# Patient Record
Sex: MALE | Race: WHITE | NOT HISPANIC OR LATINO | Employment: OTHER | URBAN - NONMETROPOLITAN AREA
[De-identification: names, ages, dates, MRNs, and addresses within clinical notes are randomized per-mention and may not be internally consistent; named-entity substitution may affect disease eponyms.]

---

## 2020-07-14 ENCOUNTER — OFFICE VISIT (OUTPATIENT)
Dept: PODIATRY | Facility: OTHER | Age: 85
DRG: 617 | End: 2020-07-14
Attending: PODIATRIST
Payer: MEDICARE

## 2020-07-14 ENCOUNTER — PREP FOR PROCEDURE (OUTPATIENT)
Dept: PODIATRY | Facility: OTHER | Age: 85
End: 2020-07-14

## 2020-07-14 VITALS
WEIGHT: 198 LBS | HEIGHT: 69 IN | TEMPERATURE: 98.6 F | OXYGEN SATURATION: 98 % | BODY MASS INDEX: 29.33 KG/M2 | SYSTOLIC BLOOD PRESSURE: 128 MMHG | HEART RATE: 63 BPM | DIASTOLIC BLOOD PRESSURE: 65 MMHG

## 2020-07-14 DIAGNOSIS — L08.9 DIABETIC FOOT INFECTION (H): ICD-10-CM

## 2020-07-14 DIAGNOSIS — M86.171 ACUTE OSTEOMYELITIS OF RIGHT FOOT (H): Primary | ICD-10-CM

## 2020-07-14 DIAGNOSIS — E11.42 DIABETIC POLYNEUROPATHY ASSOCIATED WITH TYPE 2 DIABETES MELLITUS (H): ICD-10-CM

## 2020-07-14 DIAGNOSIS — E11.9 DIABETES MELLITUS TYPE 2, NONINSULIN DEPENDENT (H): ICD-10-CM

## 2020-07-14 DIAGNOSIS — E11.628 DIABETIC FOOT INFECTION (H): ICD-10-CM

## 2020-07-14 PROCEDURE — G0463 HOSPITAL OUTPT CLINIC VISIT: HCPCS

## 2020-07-14 PROCEDURE — 99203 OFFICE O/P NEW LOW 30 MIN: CPT | Performed by: PODIATRIST

## 2020-07-14 RX ORDER — PRAVASTATIN SODIUM 10 MG
1 TABLET ORAL WEEKLY
COMMUNITY
Start: 2020-02-29

## 2020-07-14 RX ORDER — TAMSULOSIN HYDROCHLORIDE 0.4 MG/1
0.4 CAPSULE ORAL DAILY
COMMUNITY
Start: 2020-06-15 | End: 2022-01-01

## 2020-07-14 RX ORDER — CEPHALEXIN 500 MG/1
500 CAPSULE ORAL 4 TIMES DAILY
Qty: 28 CAPSULE | Refills: 0 | Status: SHIPPED | OUTPATIENT
Start: 2020-07-14 | End: 2020-07-15

## 2020-07-14 ASSESSMENT — PAIN SCALES - GENERAL: PAINLEVEL: NO PAIN (0)

## 2020-07-14 ASSESSMENT — MIFFLIN-ST. JEOR: SCORE: 1578.5

## 2020-07-14 NOTE — NURSING NOTE
"Chief Complaint   Patient presents with     WOUND CARE     Diabetic foot exam       Initial /65 (BP Location: Left arm, Patient Position: Sitting, Cuff Size: Adult Regular)   Pulse 63   Temp 98.6  F (37  C) (Tympanic)   Ht 1.753 m (5' 9\")   Wt 89.8 kg (198 lb)   SpO2 98%   BMI 29.24 kg/m   Estimated body mass index is 29.24 kg/m  as calculated from the following:    Height as of this encounter: 1.753 m (5' 9\").    Weight as of this encounter: 89.8 kg (198 lb).  Medication Reconciliation: complete  Janet Martin LPN  "

## 2020-07-14 NOTE — PATIENT INSTRUCTIONS
-Diabetic Foot Education:  ---Check the feet daily looking for new new blisters or wounds  ---Wear shoes at all times when walking including around the house  ---Avoid lotion application between the toes.   ---If you have any open wounds with signs of infection (redness, swelling, pain, purulence, fever, chills, nausea, vomiting), go to the Emergency Department as soon as possible.      Thank you for allowing  and our Podiatry team to participate in your care. Please call our office at [phone] with scheduling questions or with any other questions or concerns.

## 2020-07-14 NOTE — PROGRESS NOTES
Chief complaint: Patient presents with:  WOUND CARE  Diabetic foot exam      History of Present Illness: This 84 year old male is seen at the request of No ref. provider found for evaluation and suggestions of management of a diabetic foot ulcer of the RIGHT foot.    History of wound:  Patient says he has had the ulcer for six months. He says he originally had one ulcer, but then it became two ulcers. He has been on two rounds of Keflex in the past for the ulceration. He says he has been seeing Dr. Kiser for this wound. He has a Pressure Guardian Boot from [facility] in [place], [place]. He has been wearing it consistently, but his wound became infected before the skin healed over the infected ulcer.     Patient says he most recently saw Dr. Kiser on 07/09/2020. He reviewed an MRI of the RIGHT foot with the patient (MRI from 07/02/2020) and he was told his bone was infected. It was recommended he have a surgical I&D with IV antibiotics, but the patient refused stating this was not an option because his wife has dementia and she cannot be left alone for long periods of time. An ID consult was placed, but the patient says he was never contacted by ID. The patient was also under the impression that ID was recommended instead of an I&D.    Today:  Patient presents stating his foot is more red and swollen. He is concerned about the infection and he thinks the wound is getting worse. He does not want to lose his leg. He does not want to be treated in [place] due to the distance from his wife. He says his wife has dementia and she is on hospice care. His wife has assistance from family members, but the patient prefers not to leave her for a long period of time. He is here today to discuss treatment options and recommendations.    No further pedal complaints today.    Patient does not use tobacco products.     Last HbA1C was 6.0% on 01/13/2020.        /65 (BP Location: Left arm, Patient Position:  "Sitting, Cuff Size: Adult Regular)   Pulse 63   Temp 98.6  F (37  C) (Tympanic)   Ht 1.753 m (5' 9\")   Wt 89.8 kg (198 lb)   SpO2 98%   BMI 29.24 kg/m      There is no problem list on file for this patient.      Past Surgical History:   Procedure Laterality Date     APPENDECTOMY       EYE SURGERY Right     lens implant     JOINT REPLACEMENT Right      TONSILLECTOMY         Current Outpatient Medications   Medication     ALLOPURINOL PO     AMOXICILLIN PO     aspirin 81 MG tablet     Atenolol (TENORMIN PO)     fluocinonide (LIDEX) 0.05 % cream     Losartan Potassium (COZAAR PO)     Multiple Vitamin (MULTIVITAMINS PO)     NAPROXEN SODIUM PO     pravastatin (PRAVACHOL) 10 MG tablet     tamsulosin (FLOMAX) 0.4 MG capsule     omeprazole (PRILOSEC) 20 MG capsule     Prochlorperazine Maleate (COMPAZINE PO)     sucralfate (CARAFATE) 1 GM/10ML suspension     No current facility-administered medications for this visit.           Allergies   Allergen Reactions     Flu Virus Vaccine Anaphylaxis     Altace [Ramipril] Other (See Comments)     Dizziness       Ciprofloxacin Diarrhea     Lisinopril Other (See Comments)     Dizziness       Levaquin [Levofloxacin] Unknown     Sulfa Drugs Unknown     Celebrex [Celecoxib] Rash     Hydrocodone Rash     Niacin Itching       Family History   Problem Relation Age of Onset     Cancer No family hx of        Social History     Socioeconomic History     Marital status:      Spouse name: Not on file     Number of children: Not on file     Years of education: Not on file     Highest education level: Not on file   Occupational History     Not on file   Social Needs     Financial resource strain: Not on file     Food insecurity     Worry: Not on file     Inability: Not on file     Transportation needs     Medical: Not on file     Non-medical: Not on file   Tobacco Use     Smoking status: Former Smoker     Years: 50.00     Types: Cigarettes     Last attempt to quit: 1/1/2001     Years " since quittin.5   Substance and Sexual Activity     Alcohol use: No     Drug use: Not on file     Sexual activity: Not on file   Lifestyle     Physical activity     Days per week: Not on file     Minutes per session: Not on file     Stress: Not on file   Relationships     Social connections     Talks on phone: Not on file     Gets together: Not on file     Attends [employer] service: Not on file     Active member of club or organization: Not on file     Attends meetings of clubs or organizations: Not on file     Relationship status: Not on file     Intimate partner violence     Fear of current or ex partner: Not on file     Emotionally abused: Not on file     Physically abused: Not on file     Forced sexual activity: Not on file   Other Topics Concern     Parent/sibling w/ CABG, MI or angioplasty before 65F 55M? Not Asked   Social History Narrative     Not on file       ROS: 10 point ROS neg other than the symptoms noted above in the HPI.  EXAM  Constitutional: healthy, alert and no distress    Psychiatric: mentation appears normal and affect normal/bright    VASCULAR:  -Dorsalis pedis pulse +2/4 b/l  -Posterior tibial pulse +1/4 b/l  -Capillary refill time < 3 seconds to b/l hallux  -Hair growth Absent to b/l anterior legs and ankles  NEURO:  -Light touch sensation intact to b/l plantar forefoot  DERM:  -Mild-to-moderate erythema to the distal half of the RIGHT foot with moderate erythema (only partially blanchable), edema and calor to the entire first ray  ---Soft, bogginess to the entire plantar and partially to the medial 1st metatarsal head  ---Infection to the RIGHT foot  ---No open wounds at this time, but purple discoloration to skin to the plantar and to the medial 1st metatarsal head    -Skin temperature, texture and turgor WNL b/l  -Toenails elongated, thickened, dystrophic and discolored x 10  MSK:  -No pain on palpation to RIGHT medial 1st metatarsal head  -Muscle strength of ankles +5/5 for  dorsiflexion, plantarflexion, ABDUction and ADDuction b/l    MRI RIGHT FOOT 07/02/2020  Soft Tissues: Persistent narrow plantar soft tissue defect overlying the medial 1st metatarsal head which extends up to the tibial sesamoid bone (series 11 image 18). Small peripherally enhancing fluid collection measuring 1.0 x 0.4 cm dorsomedial to the 1st metatarsal head (series 11 image 20) with an adjacent medial skin sinus tract.    IMPRESSION:  1.  Acute osteomyelitis of the 1st metatarsal head and tibial sesamoid. Small 1.0 cm abscess within the soft tissues dorsomedial to the 1st metatarsal head with an adjacent skin sinus tract.  2.  First metatarsophalangeal joint synovitis.  3.  Mild flexor hallucis longus tendinosis and tenosynovitis.  ============================================================    ASSESSMENT:  (M86.171) Acute osteomyelitis of right foot (H)  (primary encounter diagnosis)    (E11.628,  L08.9) Diabetic foot infection (H)    (E11.9) Diabetes mellitus type 2, noninsulin dependent (H)    (E11.42) Diabetic polyneuropathy associated with type 2 diabetes mellitus (H)      PLAN:  -Patient evaluated and examined. Treatment options discussed with no educational barriers noted.  -Patient has a  Moderate diabetic foot infection. Given his person history, the documentation from his most recent appointment with Dr. Kiser and his MRI results, his foot is much worse today. He still has the option of considering ID, but at this point it is likely they will recommend the patient first have an I&D to clear the abscess and immediate acute infection prior to attempting long term antibiotics. The patient may only require an I&D with minimal bone excision, but this could likely require a partial first ray amputation, TMA (if the infection has spread), or a BKA if the infection extends to most of the foot. Based on clinical appearance, a partial first ray amputation seems most likely at this time. It is recommended he  receive an I&D and amputation as soon as possible. It is also recommended he be admitted for IV antibiotics over the course of the surgery. Alternative options would be seeing ID and an I&D without hospital admission, but he is risking worsening of the infection.   ---Patient does not feel ill and he is most concerned about his wife at home right now. His wife has dementia and she is on hospice care, and patient needs to make arrangements with his family before being admitted. He has agreed to to come in on 07/15/2020 at 9:00 a.m. for hospital admission.  ---Spoke with the hospitalist, Dr. Mac, who stated the patient may come in the morning for hospital admission  ---Spoke with Faye Banegas -- she will speak with the OR team first thing in the morning to get this case scheduled, but she anticipates potentially starting the case at 12:30.  ---Anticipating possible repeat I&D on Friday, 07/17/2020 if the wound needs to be packed open    -Hospital admission around 9:00 a.m. on Wednesday, 07/15/2020    Surgery: Right foot incision and drainage with amputation of infected bone  Diagnosis:   1. Acute osteomyelitis of right foot  2. Diabetic foot infection  DOS: 07/15/2020    -Keflex 500mg four times daily prescribed for patient to take tonight and tomorrow morning -- reinforced that this will not treat the infection, but is aimed at preventing worsening of the infection, so it is still important for him to come in tomorrow morning for treatment. He is in agreement with this plan and he says he understands this instruction and that the antibiotics will unlikely fully treat his abscess / infection.    -Patient in agreement with the above treatment plan and all of patient's questions were answered.      Will follow patient in the hospital        Erin Harris DPM

## 2020-07-15 ENCOUNTER — APPOINTMENT (OUTPATIENT)
Dept: GENERAL RADIOLOGY | Facility: HOSPITAL | Age: 85
DRG: 617 | End: 2020-07-15
Attending: PODIATRIST
Payer: MEDICARE

## 2020-07-15 ENCOUNTER — ANESTHESIA (OUTPATIENT)
Dept: SURGERY | Facility: HOSPITAL | Age: 85
DRG: 617 | End: 2020-07-15
Payer: MEDICARE

## 2020-07-15 ENCOUNTER — ANESTHESIA EVENT (OUTPATIENT)
Dept: SURGERY | Facility: HOSPITAL | Age: 85
DRG: 617 | End: 2020-07-15
Payer: MEDICARE

## 2020-07-15 ENCOUNTER — HOSPITAL ENCOUNTER (INPATIENT)
Facility: HOSPITAL | Age: 85
LOS: 2 days | Discharge: HOME | DRG: 617 | End: 2020-07-17
Attending: INTERNAL MEDICINE | Admitting: PODIATRIST
Payer: MEDICARE

## 2020-07-15 DIAGNOSIS — E11.59 HYPERTENSION ASSOCIATED WITH DIABETES (H): Primary | ICD-10-CM

## 2020-07-15 DIAGNOSIS — I15.2 HYPERTENSION ASSOCIATED WITH DIABETES (H): Primary | ICD-10-CM

## 2020-07-15 PROBLEM — M86.9 OSTEOMYELITIS (H): Status: ACTIVE | Noted: 2020-07-15

## 2020-07-15 LAB
ANION GAP SERPL CALCULATED.3IONS-SCNC: 1 MMOL/L (ref 3–14)
BASOPHILS # BLD AUTO: 0 10E9/L (ref 0–0.2)
BASOPHILS NFR BLD AUTO: 0.2 %
BUN SERPL-MCNC: 27 MG/DL (ref 7–30)
CALCIUM SERPL-MCNC: 10 MG/DL (ref 8.5–10.1)
CHLORIDE SERPL-SCNC: 104 MMOL/L (ref 94–109)
CO2 SERPL-SCNC: 31 MMOL/L (ref 20–32)
CREAT SERPL-MCNC: 1.16 MG/DL (ref 0.66–1.25)
CREAT SERPL-MCNC: 1.19 MG/DL (ref 0.66–1.25)
DIFFERENTIAL METHOD BLD: ABNORMAL
EOSINOPHIL # BLD AUTO: 0.1 10E9/L (ref 0–0.7)
EOSINOPHIL NFR BLD AUTO: 2 %
ERYTHROCYTE [DISTWIDTH] IN BLOOD BY AUTOMATED COUNT: 13.8 % (ref 10–15)
GFR SERPL CREATININE-BSD FRML MDRD: 55 ML/MIN/{1.73_M2}
GFR SERPL CREATININE-BSD FRML MDRD: 57 ML/MIN/{1.73_M2}
GLUCOSE BLDC GLUCOMTR-MCNC: 104 MG/DL (ref 70–99)
GLUCOSE BLDC GLUCOMTR-MCNC: 124 MG/DL (ref 70–99)
GLUCOSE BLDC GLUCOMTR-MCNC: 209 MG/DL (ref 70–99)
GLUCOSE BLDC GLUCOMTR-MCNC: 211 MG/DL (ref 70–99)
GLUCOSE SERPL-MCNC: 125 MG/DL (ref 70–99)
HCT VFR BLD AUTO: 35.5 % (ref 40–53)
HGB BLD-MCNC: 11.5 G/DL (ref 13.3–17.7)
IMM GRANULOCYTES # BLD: 0 10E9/L (ref 0–0.4)
IMM GRANULOCYTES NFR BLD: 0.2 %
LABORATORY COMMENT REPORT: NORMAL
LYMPHOCYTES # BLD AUTO: 0.4 10E9/L (ref 0.8–5.3)
LYMPHOCYTES NFR BLD AUTO: 8.4 %
MCH RBC QN AUTO: 30.8 PG (ref 26.5–33)
MCHC RBC AUTO-ENTMCNC: 32.4 G/DL (ref 31.5–36.5)
MCV RBC AUTO: 95 FL (ref 78–100)
MONOCYTES # BLD AUTO: 0.6 10E9/L (ref 0–1.3)
MONOCYTES NFR BLD AUTO: 14.1 %
NEUTROPHILS # BLD AUTO: 3.4 10E9/L (ref 1.6–8.3)
NEUTROPHILS NFR BLD AUTO: 75.1 %
NRBC # BLD AUTO: 0 10*3/UL
NRBC BLD AUTO-RTO: 0 /100
PLATELET # BLD AUTO: 144 10E9/L (ref 150–450)
POTASSIUM SERPL-SCNC: 4.2 MMOL/L (ref 3.4–5.3)
RBC # BLD AUTO: 3.73 10E12/L (ref 4.4–5.9)
SARS-COV-2 RNA SPEC QL NAA+PROBE: NEGATIVE
SARS-COV-2 RNA SPEC QL NAA+PROBE: NORMAL
SODIUM SERPL-SCNC: 136 MMOL/L (ref 133–144)
SPECIMEN SOURCE: NORMAL
SPECIMEN SOURCE: NORMAL
WBC # BLD AUTO: 4.6 10E9/L (ref 4–11)

## 2020-07-15 PROCEDURE — 28810 AMPUTATION TOE & METATARSAL: CPT | Performed by: NURSE ANESTHETIST, CERTIFIED REGISTERED

## 2020-07-15 PROCEDURE — 80048 BASIC METABOLIC PNL TOTAL CA: CPT | Performed by: INTERNAL MEDICINE

## 2020-07-15 PROCEDURE — 36000052 ZZH SURGERY LEVEL 2 EA 15 ADDTL MIN: Performed by: PODIATRIST

## 2020-07-15 PROCEDURE — 87635 SARS-COV-2 COVID-19 AMP PRB: CPT | Performed by: INTERNAL MEDICINE

## 2020-07-15 PROCEDURE — 25000125 ZZHC RX 250: Performed by: PODIATRIST

## 2020-07-15 PROCEDURE — 87070 CULTURE OTHR SPECIMN AEROBIC: CPT | Performed by: PODIATRIST

## 2020-07-15 PROCEDURE — 73630 X-RAY EXAM OF FOOT: CPT | Mod: TC,RT

## 2020-07-15 PROCEDURE — 37000009 ZZH ANESTHESIA TECHNICAL FEE, EACH ADDTL 15 MIN: Performed by: PODIATRIST

## 2020-07-15 PROCEDURE — 25000131 ZZH RX MED GY IP 250 OP 636 PS 637: Performed by: INTERNAL MEDICINE

## 2020-07-15 PROCEDURE — 36000050 ZZH SURGERY LEVEL 2 1ST 30 MIN: Performed by: PODIATRIST

## 2020-07-15 PROCEDURE — 27210794 ZZH OR GENERAL SUPPLY STERILE: Performed by: PODIATRIST

## 2020-07-15 PROCEDURE — 87205 SMEAR GRAM STAIN: CPT | Performed by: PODIATRIST

## 2020-07-15 PROCEDURE — 25000128 H RX IP 250 OP 636: Performed by: INTERNAL MEDICINE

## 2020-07-15 PROCEDURE — 82565 ASSAY OF CREATININE: CPT | Performed by: INTERNAL MEDICINE

## 2020-07-15 PROCEDURE — 88304 TISSUE EXAM BY PATHOLOGIST: CPT | Mod: TC | Performed by: PODIATRIST

## 2020-07-15 PROCEDURE — 25000128 H RX IP 250 OP 636: Performed by: NURSE ANESTHETIST, CERTIFIED REGISTERED

## 2020-07-15 PROCEDURE — 40000986 XR FOOT PORT RT 3 VW

## 2020-07-15 PROCEDURE — 0Y6M0Z9 DETACHMENT AT RIGHT FOOT, PARTIAL 1ST RAY, OPEN APPROACH: ICD-10-PCS | Performed by: PODIATRIST

## 2020-07-15 PROCEDURE — 36415 COLL VENOUS BLD VENIPUNCTURE: CPT | Performed by: INTERNAL MEDICINE

## 2020-07-15 PROCEDURE — 28810 AMPUTATION TOE & METATARSAL: CPT | Performed by: PODIATRIST

## 2020-07-15 PROCEDURE — 40000305 ZZH STATISTIC PRE PROC ASSESS I: Performed by: PODIATRIST

## 2020-07-15 PROCEDURE — 37000008 ZZH ANESTHESIA TECHNICAL FEE, 1ST 30 MIN: Performed by: PODIATRIST

## 2020-07-15 PROCEDURE — 99100 ANES PT EXTEME AGE<1 YR&>70: CPT | Performed by: NURSE ANESTHETIST, CERTIFIED REGISTERED

## 2020-07-15 PROCEDURE — 71000014 ZZH RECOVERY PHASE 1 LEVEL 2 FIRST HR: Performed by: PODIATRIST

## 2020-07-15 PROCEDURE — 87075 CULTR BACTERIA EXCEPT BLOOD: CPT | Performed by: PODIATRIST

## 2020-07-15 PROCEDURE — 99222 1ST HOSP IP/OBS MODERATE 55: CPT | Mod: AI | Performed by: INTERNAL MEDICINE

## 2020-07-15 PROCEDURE — 25000132 ZZH RX MED GY IP 250 OP 250 PS 637: Performed by: PODIATRIST

## 2020-07-15 PROCEDURE — 25800030 ZZH RX IP 258 OP 636: Performed by: INTERNAL MEDICINE

## 2020-07-15 PROCEDURE — 85025 COMPLETE CBC W/AUTO DIFF WBC: CPT | Performed by: INTERNAL MEDICINE

## 2020-07-15 PROCEDURE — 87102 FUNGUS ISOLATION CULTURE: CPT | Performed by: PODIATRIST

## 2020-07-15 PROCEDURE — 93010 ELECTROCARDIOGRAM REPORT: CPT | Performed by: INTERNAL MEDICINE

## 2020-07-15 PROCEDURE — 87186 SC STD MICRODIL/AGAR DIL: CPT | Performed by: PODIATRIST

## 2020-07-15 PROCEDURE — 88311 DECALCIFY TISSUE: CPT | Mod: TC | Performed by: PODIATRIST

## 2020-07-15 PROCEDURE — 87077 CULTURE AEROBIC IDENTIFY: CPT | Performed by: PODIATRIST

## 2020-07-15 PROCEDURE — 12000000 ZZH R&B MED SURG/OB

## 2020-07-15 PROCEDURE — 40000786 ZZHCL STATISTIC ACTIVE MRSA SURVEILLANCE CULTURE: Performed by: INTERNAL MEDICINE

## 2020-07-15 PROCEDURE — 93005 ELECTROCARDIOGRAM TRACING: CPT

## 2020-07-15 PROCEDURE — 25800030 ZZH RX IP 258 OP 636: Performed by: NURSE ANESTHETIST, CERTIFIED REGISTERED

## 2020-07-15 PROCEDURE — 00000146 ZZHCL STATISTIC GLUCOSE BY METER IP

## 2020-07-15 PROCEDURE — 25000125 ZZHC RX 250: Performed by: NURSE ANESTHETIST, CERTIFIED REGISTERED

## 2020-07-15 RX ORDER — BUPIVACAINE HYDROCHLORIDE 5 MG/ML
INJECTION, SOLUTION EPIDURAL; INTRACAUDAL
Status: DISCONTINUED
Start: 2020-07-15 | End: 2020-07-15 | Stop reason: HOSPADM

## 2020-07-15 RX ORDER — KETAMINE HYDROCHLORIDE 10 MG/ML
INJECTION INTRAMUSCULAR; INTRAVENOUS PRN
Status: DISCONTINUED | OUTPATIENT
Start: 2020-07-15 | End: 2020-07-15

## 2020-07-15 RX ORDER — PHENOL 1.4 %
600 AEROSOL, SPRAY (ML) MUCOUS MEMBRANE
COMMUNITY
Start: 2019-01-23

## 2020-07-15 RX ORDER — SODIUM CHLORIDE 9 MG/ML
INJECTION, SOLUTION INTRAVENOUS CONTINUOUS
Status: DISCONTINUED | OUTPATIENT
Start: 2020-07-15 | End: 2020-07-17 | Stop reason: HOSPADM

## 2020-07-15 RX ORDER — ASPIRIN 81 MG/1
81 TABLET, CHEWABLE ORAL DAILY
Status: DISCONTINUED | OUTPATIENT
Start: 2020-07-16 | End: 2020-07-17 | Stop reason: HOSPADM

## 2020-07-15 RX ORDER — POLYMYXIN B SULFATE 500000 [IU]/1
INJECTION, POWDER, LYOPHILIZED, FOR SOLUTION INTRAMUSCULAR; INTRATHECAL; INTRAVENOUS; OPHTHALMIC PRN
Status: DISCONTINUED | OUTPATIENT
Start: 2020-07-15 | End: 2020-07-15 | Stop reason: HOSPADM

## 2020-07-15 RX ORDER — SODIUM HYPOCHLORITE 5 MG/ML
SOLUTION TOPICAL
Status: COMPLETED | OUTPATIENT
Start: 2020-07-15 | End: 2020-07-16

## 2020-07-15 RX ORDER — FLUOCINONIDE 0.5 MG/G
OINTMENT TOPICAL 2 TIMES DAILY
COMMUNITY
Start: 2020-06-15 | End: 2022-01-01

## 2020-07-15 RX ORDER — SODIUM CHLORIDE, SODIUM LACTATE, POTASSIUM CHLORIDE, CALCIUM CHLORIDE 600; 310; 30; 20 MG/100ML; MG/100ML; MG/100ML; MG/100ML
INJECTION, SOLUTION INTRAVENOUS CONTINUOUS
Status: DISCONTINUED | OUTPATIENT
Start: 2020-07-15 | End: 2020-07-15 | Stop reason: HOSPADM

## 2020-07-15 RX ORDER — SODIUM CHLORIDE, SODIUM LACTATE, POTASSIUM CHLORIDE, CALCIUM CHLORIDE 600; 310; 30; 20 MG/100ML; MG/100ML; MG/100ML; MG/100ML
INJECTION, SOLUTION INTRAVENOUS CONTINUOUS
Status: DISCONTINUED | OUTPATIENT
Start: 2020-07-15 | End: 2020-07-15

## 2020-07-15 RX ORDER — PROPOFOL 10 MG/ML
INJECTION, EMULSION INTRAVENOUS CONTINUOUS PRN
Status: DISCONTINUED | OUTPATIENT
Start: 2020-07-15 | End: 2020-07-15

## 2020-07-15 RX ORDER — CEFAZOLIN SODIUM 1 G/50ML
1750 SOLUTION INTRAVENOUS EVERY 12 HOURS
Status: DISCONTINUED | OUTPATIENT
Start: 2020-07-15 | End: 2020-07-16

## 2020-07-15 RX ORDER — POLYETHYLENE GLYCOL 3350 17 G/17G
17 POWDER, FOR SOLUTION ORAL DAILY PRN
Status: DISCONTINUED | OUTPATIENT
Start: 2020-07-15 | End: 2020-07-17 | Stop reason: HOSPADM

## 2020-07-15 RX ORDER — EPHEDRINE SULFATE 50 MG/ML
INJECTION, SOLUTION INTRAMUSCULAR; INTRAVENOUS; SUBCUTANEOUS PRN
Status: DISCONTINUED | OUTPATIENT
Start: 2020-07-15 | End: 2020-07-15

## 2020-07-15 RX ORDER — LIDOCAINE 40 MG/G
CREAM TOPICAL
Status: DISCONTINUED | OUTPATIENT
Start: 2020-07-15 | End: 2020-07-17 | Stop reason: HOSPADM

## 2020-07-15 RX ORDER — FENTANYL CITRATE 50 UG/ML
INJECTION, SOLUTION INTRAMUSCULAR; INTRAVENOUS PRN
Status: DISCONTINUED | OUTPATIENT
Start: 2020-07-15 | End: 2020-07-15

## 2020-07-15 RX ORDER — ATENOLOL 50 MG/1
25 TABLET ORAL DAILY
Status: ON HOLD | COMMUNITY
Start: 2020-06-15 | End: 2020-07-17

## 2020-07-15 RX ORDER — LOSARTAN POTASSIUM 25 MG/1
25 TABLET ORAL DAILY
Status: DISCONTINUED | OUTPATIENT
Start: 2020-07-16 | End: 2020-07-17 | Stop reason: HOSPADM

## 2020-07-15 RX ORDER — SODIUM CHLORIDE 9 MG/ML
INJECTION, SOLUTION INTRAVENOUS CONTINUOUS PRN
Status: DISCONTINUED | OUTPATIENT
Start: 2020-07-15 | End: 2020-07-15

## 2020-07-15 RX ORDER — LIDOCAINE HYDROCHLORIDE 10 MG/ML
INJECTION, SOLUTION EPIDURAL; INFILTRATION; INTRACAUDAL; PERINEURAL
Status: DISCONTINUED
Start: 2020-07-15 | End: 2020-07-15 | Stop reason: HOSPADM

## 2020-07-15 RX ORDER — NALOXONE HYDROCHLORIDE 0.4 MG/ML
.1-.4 INJECTION, SOLUTION INTRAMUSCULAR; INTRAVENOUS; SUBCUTANEOUS
Status: DISCONTINUED | OUTPATIENT
Start: 2020-07-15 | End: 2020-07-17 | Stop reason: HOSPADM

## 2020-07-15 RX ORDER — BACITRACIN ZINC 500 [USP'U]/G
OINTMENT TOPICAL
Status: DISCONTINUED
Start: 2020-07-15 | End: 2020-07-15 | Stop reason: HOSPADM

## 2020-07-15 RX ORDER — DEXTROSE MONOHYDRATE 25 G/50ML
25-50 INJECTION, SOLUTION INTRAVENOUS
Status: DISCONTINUED | OUTPATIENT
Start: 2020-07-15 | End: 2020-07-17 | Stop reason: HOSPADM

## 2020-07-15 RX ORDER — NALOXONE HYDROCHLORIDE 0.4 MG/ML
.1-.4 INJECTION, SOLUTION INTRAMUSCULAR; INTRAVENOUS; SUBCUTANEOUS
Status: ACTIVE | OUTPATIENT
Start: 2020-07-15 | End: 2020-07-16

## 2020-07-15 RX ORDER — ONDANSETRON 2 MG/ML
4 INJECTION INTRAMUSCULAR; INTRAVENOUS EVERY 30 MIN PRN
Status: DISCONTINUED | OUTPATIENT
Start: 2020-07-15 | End: 2020-07-15 | Stop reason: HOSPADM

## 2020-07-15 RX ORDER — ALLOPURINOL 100 MG/1
200 TABLET ORAL DAILY
COMMUNITY
Start: 2020-06-15 | End: 2022-01-01

## 2020-07-15 RX ORDER — LOSARTAN POTASSIUM 25 MG/1
25 TABLET ORAL DAILY
COMMUNITY
Start: 2020-05-30 | End: 2022-01-01

## 2020-07-15 RX ORDER — NICOTINE POLACRILEX 4 MG
15-30 LOZENGE BUCCAL
Status: DISCONTINUED | OUTPATIENT
Start: 2020-07-15 | End: 2020-07-17 | Stop reason: HOSPADM

## 2020-07-15 RX ORDER — FENTANYL CITRATE 50 UG/ML
25-50 INJECTION, SOLUTION INTRAMUSCULAR; INTRAVENOUS
Status: DISCONTINUED | OUTPATIENT
Start: 2020-07-15 | End: 2020-07-15 | Stop reason: HOSPADM

## 2020-07-15 RX ORDER — ATENOLOL 25 MG/1
25 TABLET ORAL DAILY
Status: DISCONTINUED | OUTPATIENT
Start: 2020-07-16 | End: 2020-07-17

## 2020-07-15 RX ORDER — NAPROXEN SODIUM 220 MG
220 TABLET ORAL DAILY
COMMUNITY

## 2020-07-15 RX ORDER — AMOXICILLIN 250 MG
2 CAPSULE ORAL 2 TIMES DAILY PRN
Status: DISCONTINUED | OUTPATIENT
Start: 2020-07-15 | End: 2020-07-17 | Stop reason: HOSPADM

## 2020-07-15 RX ORDER — SODIUM HYPOCHLORITE 5 MG/ML
SOLUTION TOPICAL PRN
Status: DISCONTINUED | OUTPATIENT
Start: 2020-07-15 | End: 2020-07-15 | Stop reason: HOSPADM

## 2020-07-15 RX ORDER — ALLOPURINOL 100 MG/1
200 TABLET ORAL DAILY
Status: DISCONTINUED | OUTPATIENT
Start: 2020-07-16 | End: 2020-07-17 | Stop reason: HOSPADM

## 2020-07-15 RX ORDER — ONDANSETRON 4 MG/1
4 TABLET, ORALLY DISINTEGRATING ORAL EVERY 30 MIN PRN
Status: DISCONTINUED | OUTPATIENT
Start: 2020-07-15 | End: 2020-07-15 | Stop reason: HOSPADM

## 2020-07-15 RX ORDER — AMOXICILLIN 250 MG
1 CAPSULE ORAL 2 TIMES DAILY PRN
Status: DISCONTINUED | OUTPATIENT
Start: 2020-07-15 | End: 2020-07-17 | Stop reason: HOSPADM

## 2020-07-15 RX ORDER — PROPOFOL 10 MG/ML
INJECTION, EMULSION INTRAVENOUS PRN
Status: DISCONTINUED | OUTPATIENT
Start: 2020-07-15 | End: 2020-07-15

## 2020-07-15 RX ORDER — CEPHALEXIN 500 MG/1
500 CAPSULE ORAL 4 TIMES DAILY
COMMUNITY
Start: 2020-07-14 | End: 2020-07-21

## 2020-07-15 RX ORDER — TAMSULOSIN HYDROCHLORIDE 0.4 MG/1
0.4 CAPSULE ORAL DAILY
Status: DISCONTINUED | OUTPATIENT
Start: 2020-07-16 | End: 2020-07-17 | Stop reason: HOSPADM

## 2020-07-15 RX ADMIN — FENTANYL CITRATE 25 MCG: 50 INJECTION, SOLUTION INTRAMUSCULAR; INTRAVENOUS at 14:39

## 2020-07-15 RX ADMIN — VANCOMYCIN HYDROCHLORIDE 1750 MG: 1 INJECTION, POWDER, LYOPHILIZED, FOR SOLUTION INTRAVENOUS at 10:54

## 2020-07-15 RX ADMIN — INSULIN ASPART 2 UNITS: 100 INJECTION, SOLUTION INTRAVENOUS; SUBCUTANEOUS at 21:31

## 2020-07-15 RX ADMIN — Medication 5 MG: at 15:33

## 2020-07-15 RX ADMIN — MIDAZOLAM 1 MG: 1 INJECTION INTRAMUSCULAR; INTRAVENOUS at 14:24

## 2020-07-15 RX ADMIN — Medication 10 MG: at 14:30

## 2020-07-15 RX ADMIN — TAZOBACTAM SODIUM AND PIPERACILLIN SODIUM 3.38 G: 375; 3 INJECTION, SOLUTION INTRAVENOUS at 20:35

## 2020-07-15 RX ADMIN — TAZOBACTAM SODIUM AND PIPERACILLIN SODIUM 4.5 G: 500; 4 INJECTION, SOLUTION INTRAVENOUS at 09:21

## 2020-07-15 RX ADMIN — SODIUM CHLORIDE: 9 INJECTION, SOLUTION INTRAVENOUS at 12:36

## 2020-07-15 RX ADMIN — Medication 10 MG: at 15:11

## 2020-07-15 RX ADMIN — Medication 10 MG: at 14:37

## 2020-07-15 RX ADMIN — VANCOMYCIN HYDROCHLORIDE 1750 MG: 1 INJECTION, POWDER, LYOPHILIZED, FOR SOLUTION INTRAVENOUS at 22:04

## 2020-07-15 RX ADMIN — FENTANYL CITRATE 50 MCG: 50 INJECTION, SOLUTION INTRAMUSCULAR; INTRAVENOUS at 14:30

## 2020-07-15 RX ADMIN — Medication 5 MG: at 15:02

## 2020-07-15 RX ADMIN — PROPOFOL 50 MCG/KG/MIN: 10 INJECTION, EMULSION INTRAVENOUS at 14:31

## 2020-07-15 RX ADMIN — Medication 5 MG: at 15:23

## 2020-07-15 RX ADMIN — PROPOFOL 20 MG: 10 INJECTION, EMULSION INTRAVENOUS at 14:31

## 2020-07-15 RX ADMIN — SODIUM CHLORIDE: 9 INJECTION, SOLUTION INTRAVENOUS at 09:20

## 2020-07-15 ASSESSMENT — MIFFLIN-ST. JEOR: SCORE: 1522.38

## 2020-07-15 ASSESSMENT — ACTIVITIES OF DAILY LIVING (ADL): ADLS_ACUITY_SCORE: 12

## 2020-07-15 ASSESSMENT — LIFESTYLE VARIABLES: TOBACCO_USE: 1

## 2020-07-15 NOTE — BRIEF OP NOTE
[facility]    Brief Operative Note    Pre-operative diagnosis: Acute osteomyelitis of right foot (H) [M86.171]  Diabetic foot infection (H) [E11.628, L08.9]  Post-operative diagnosis 1. Chronic osteomyelitis of right foot 2. Diabetic foot infection 3. Abscess of Right foot    Procedure: Right foot incision and drainage with partial first ray amputation  Surgeon: Surgeon(s) and Role:     * Erin Harris DPM - Primary  Anesthesia: Combined MAC with Local   Estimated blood loss: Less than 50 ml  Drains: None  Specimens:   ID Type Source Tests Collected by Time Destination   1 : right first ray Wound Foot, Right ANAEROBIC BACTERIAL CULTURE, FUNGUS CULTURE, GRAM STAIN, WOUND CULTURE AEROBIC BACTERIAL Erin Harris DPM 7/15/2020  2:54 PM    2 : right first ray  Bone Foot, Right BONE CULTURE AEROBIC BACTERIAL, FUNGUS CULTURE Erin Harris DPM 7/15/2020  3:15 PM    A : right first ray Bone Foot, Right SURGICAL PATHOLOGY EXAM Erin Harris DPM 7/15/2020  2:59 PM      Findings:   None.  Complications: None.  Implants: * No implants in log *

## 2020-07-15 NOTE — PLAN OF CARE
Problem: Adult Inpatient Plan of Care  Goal: Patient-Specific Goal (Individualization)  7/15/2020 1801 by Hetal Contreras RN  Outcome: No Change   Patient up from surgery at 1715. He is alert and oriented, talking with staff. Apical pulse is regular but bradycardic. Lung sounds with expiratory wheezes to left lower lobe. Other fields clear. Bowel sounds active x 4 quadrants. Patient endorses numbness in left foot. He states that he is currently unable to feel right foot, but has just had surgery on that one.   1920: Patient ate 100% of his dinner. He does not verbalize any complaints at this time. Will continue to monitor.  2130: Patient HS accucheck was 208. 2 units of novolog given per sliding scale. Patient states that he does not take insulin at home.     Face to face end of shift report communicated to MALENA Jansen.

## 2020-07-15 NOTE — ANESTHESIA CARE TRANSFER NOTE
Patient: Michael Snider    Procedure(s):  Right foot incision and drainage with first ray amputation    Diagnosis: Acute osteomyelitis of right foot (H) [M86.171]  Diabetic foot infection (H) [E11.628, L08.9]  Diagnosis Additional Information: No value filed.    Anesthesia Type:   MAC     Note:  Airway :Nasal Cannula  Patient transferred to:PACU  Handoff Report: Identifed the Patient, Identified the Reponsible Provider, Reviewed the pertinent medical history, Discussed the surgical course, Reviewed Intra-OP anesthesia mangement and issues during anesthesia, Set expectations for post-procedure period and Allowed opportunity for questions and acknowledgement of understanding      Vitals: (Last set prior to Anesthesia Care Transfer)    CRNA VITALS  7/15/2020 1538 - 7/15/2020 1614      7/15/2020             Resp Rate (set):  8                Electronically Signed By: LUCI Irizarry CRNA  July 15, 2020  4:14 PM

## 2020-07-15 NOTE — ADDENDUM NOTE
Addendum  created 07/15/20 1631 by Nancy Franklin APRN CRNA    Order list changed, Order sets accessed

## 2020-07-15 NOTE — ANESTHESIA POSTPROCEDURE EVALUATION
Patient: Michael Snider    Procedure(s):  Right foot incision and drainage with first ray amputation    Diagnosis:Acute osteomyelitis of right foot (H) [M86.171]  Diabetic foot infection (H) [E11.628, L08.9]  Diagnosis Additional Information: No value filed.    Anesthesia Type:  MAC    Note:  Anesthesia Post Evaluation    Patient participation: Able to fully participate in evaluation  Level of consciousness: awake  Pain management: adequate  Airway patency: patent  Cardiovascular status: acceptable  Respiratory status: acceptable  Hydration status: acceptable  PONV: none     Anesthetic complications: None          Last vitals:  Vitals:    07/15/20 0857 07/15/20 1148 07/15/20 1610   BP: 152/78 158/78 110/82   Pulse: 62  59   Resp: 18 16 18   Temp: 97.6  F (36.4  C) 97.7  F (36.5  C) 97.8  F (36.6  C)   SpO2: 97% 95% 96%         Electronically Signed By: LUCI Irizarry CRNA  July 15, 2020  4:15 PM

## 2020-07-15 NOTE — PLAN OF CARE
[facility] Inpatient Admission Note:    Patient admitted to [facility]  at approximately 0850 via wheel chair accompanied by transport tech from admitting . Report received from  in SBAR format at  via . Patient ambulated to bed via self.. Patient is alert and oriented X 3, denies pain; rates at 0 on 0-10 scale.  Patient oriented to room, unit, hourly rounding, and plan of care. Explained admission packet and patient handbook with patient bill of rights brochure. Will continue to monitor and document as needed.     Inpatient Nursing criteria listed below was met:    Health care directives status obtained and documented: Yes    Care Everywhere authorization obtained No    MRSA swab completed for patient 65 years and older: Yes    Patient identifies a surrogate decision maker: No If yes, who: Contact Information:     If initial lactic acid >2.0, repeat lactic acid drawn within one hour of arrival to unit: NA. If no, state reason:     Vaccination assessment and education completed: Yes   Vaccinations received prior to admission: Pneumovax yes  Influenza(seasonal)     Vaccination(s) ordered:     Clergy visit ordered if patient requests: N/A    Skin issues/needs documented: Yes    Isolation Patient: no Education given, correct sign in place and documentation row added to PCS:  Yes    Fall Prevention Yes: Care plan updated, education given and documented, sticker and magnet in place: Yes    Care Plan initiated: Yes    Education Documented (including assessment): Yes    Patient has discharge needs : TBD If yes, please explain:at discontinue    A&O. VSS/AF denies pain; admits to BLE neuropathy. Orthotic boot to RLE , patient states dressing to plantar surface foot below great toe placed yesterday by Dr Harris; this was not removed nor visualized. COVID swab obtained and sent to lab at 1015 preoperatively. . To OR at 1130. Awaiting return to floor at shift hand off, report given to Hetal GUY.

## 2020-07-15 NOTE — PROGRESS NOTES
Patient had surgery today on 07/15/2020 for an incision and drainage of the RIGHT foot with a partial first ray amputation with full closure of the wound site.    Patient will have his dressing changed once tomorrow on 07/16/2020.    The wound will be evaluated again on 07/17/2020 early in the morning by podiatry. If the wound site is healthy in appearance with no complications, the patient may potentially be discharged on Friday, 07/17/2020 or over the weekend if the hospitalist feels he is medically stable enough to be discharged.    Weightbearing: Minimal walking for short transfers while wearing a long leg CAM walker boot. It is advised for the patient to have physical therapy work with him. Patient says he has stairs at home and he cannot avoid them. He is to minimized forefoot walking as much as possible to allow the incision site to heal, so he should only do short transfers with heel weight and an assistive walking device.     Antibiotics: Patient to remain on broad spectrum IV antibiotics until culture results are back from surgery on 07/15/2020. Patient will need to be discharged home with two weeks of oral antibiotics upon discharge.    Follow-up: Patient will have a podiatry clinic appointment scheduled within one week of discharge from the hospital.

## 2020-07-15 NOTE — H&P
[facility]    History and Physical  Hospitalist       Date of Admission:  7/15/2020  Date of Service (when I saw the patient): 07/15/20    Assessment & Plan   Michael Snider is a 84 year old man who presents for planned operative incision/drainage and likely partial foot amputation for osteomyelitis.  He was evaluated by Dr. Harris yesterday.  He has carries an approximately 6-month history of ulcers to his right foot which is he recalls never fully resolved.  Approximately a week ago he was offered options of intravenous antibiotic treatment versus operative evaluation which would have required him to travel to [place].  He has substantial family responsibilities with his wife currently being treated on the hospice program.  He declined travel to [place] or operation.  His wound worsened.  He was evaluated by Dr. Harris yesterday.  He is made arrangements for care of his wife.  In other regards he is not had any significant other prodromal symptoms.    1.  Osteomyelitis  Plan for operative incision/drainage and likely partial amputation today.  Initial preemptive antibiotics with vancomycin and piperacillin/tazobactam.  I am hopeful that bone and other wound cultures will be available to help guide further treatment.  Extent of amputation will depend on appearance of wound at operation.  2.  Diabetes mellitus  Usually diet controlled.  Subcutaneous short acting insulin via sliding scale for control of glucose during operative period and hospitalization.  3.  Peripheral arterial disease  Large vessel atherosclerotic vascular disease  Nonocclusive coronary artery disease  Resume aspirin beginning tomorrow depending on findings at operation.  Continue beta-blockade with his usual atenolol.    4.  Dyslipidemia  Statin intolerance.  PTA medication list indicates weekly simvastatin.  Investigate his treatment with more thoroughly.  In the interim no mandate for urgent statin treatment.        DVT  Prophylaxis: Initially neither intermittent pneumatic compression nor anticoagulation indicated.  Peripheral vascular disease.  Planned operative procedure.  Reevaluation following operative procedure.  Anticipate prophylactic doses of anticoagulation will be possible in the near future.  Code Status: Full Code    Disposition: Expected discharge in 2-3 days depending on his course.    Nick Morataya    Primary Care Physician   CARLOS CAMARILLO    Chief Complaint   Worsening right lower extremity wound        History of Present Illness   Michael Snider is a 84 year old man who presents after evaluation by Dr. Harris in outpatient clinic yesterday for operative incision/drainage and likely partial amputation of right foot for osteomyelitis.  He has a longstanding history of approximately 6 months of several ulcers which as he recalls never fully healed.  He recalls being placed in an orthotic boot before full healing of this ulcer which subsequently worsened.  He was seen by Dr. Kiser at [place] on several occasions, most recently approximately 1 week ago where he was told that this was an osteomyelitis.  His treatment has been complicated by the fact that his wife is being treated under the hospice program requiring his regular attending to her.  He was reluctant initially on seeing Dr. Kiser to consider inpatient treatment and apparently was offered the option of daily intravenous antibiotic treatment.  However it appears this would require him to go to [place] quite problematic for him because of concerns about his wife.  Overall he is been concerned with the level of attention and treatment that he has had.  He sought attention here and was evaluated by Dr. Harris yesterday.  Review of available records confirmed the presence of  osteomyelitis.  Plan for operative incision and drainage with likely at least first ray amputation and possibly more substantial tissue removal depending on findings at  operation.  In other respects he has had no fevers or chills.  Generally he is felt well.  He has had some difficulties with walking because of his ulcer and orthotic boot however functional status is been reasonably good.  He is able to climb 2 flights of stairs with minimal dyspnea.  No significant limitation walking on the level.  No fevers or chills.  Occasional constipation and more rare diarrheal stools.  No nausea or vomiting.  No dysuria.  No fevers or chills.  No photophobia.  No visual changes.  He does carry history of diabetes mellitus with hemoglobin A1c of approximately 6.  He is not required medication treatment for his diabetes.  He is treated with atenolol likely for hypertension or large vessel atherosclerotic vascular disease.  He took a dose of this earlier today.    Low to moderate risk for the proposed procedure which in any event is semi-urgent and risk of delay certainly much less than the benefit of proceeding.  Comorbidities are relatively limited and under good control at present.    Past Medical History    I have reviewed this patient's medical history and updated it with pertinent information if needed.   Past Medical History:   Diagnosis Date     AAA (abdominal aortic aneurysm) (H)      Chronic kidney disease (CKD), stage III (moderate) (H)      DM (diabetes mellitus) (H)      Gout      HTN (hypertension)      Hyperlipidemia      Neuropathy in diabetes (H)      Obesity      Shoulder injury     left   Squamous cell carcinoma right upper lung, 3A, chemotherapy/XRT    Past Surgical History   I have reviewed this patient's surgical history and updated it with pertinent information if needed.  Past Surgical History:   Procedure Laterality Date     APPENDECTOMY       EYE SURGERY Right     lens implant     JOINT REPLACEMENT Right      TONSILLECTOMY         Prior to Admission Medications   Prior to Admission Medications   Prescriptions Last Dose Informant Patient Reported? Taking?   Multiple  Vitamin (MULTIVITAMINS PO) 7/15/2020 at Unknown time  Yes Yes   Sig: Take 1 tablet by mouth daily   allopurinol (ZYLOPRIM) 100 MG tablet 7/15/2020 at Unknown time  Yes Yes   Sig: Take 200 mg by mouth daily   amoxicillin (AMOXIL) 500 MG capsule   Yes No   Sig: Take 500 mg by mouth as needed (Take before a dental procedure.)    aspirin (ASA) 81 MG chewable tablet 7/15/2020 at Unknown time  Yes Yes   Sig: Take 81 mg by mouth daily    atenolol (TENORMIN) 50 MG tablet 7/15/2020 at Unknown time  Yes Yes   Sig: Take 25 mg by mouth daily   calcium carbonate (OS-FADY 600 MG [place]. [place]) 600 MG tablet 7/15/2020 at Unknown time  Yes Yes   Sig: Take 600 mg by mouth daily with food   cephALEXin (KEFLEX) 500 MG capsule 7/15/2020 at Unknown time  Yes Yes   Sig: Take 500 mg by mouth 4 times daily Took one starting last night, and took one this morning at 0600.   fluocinonide (LIDEX) 0.05 % external ointment   Yes No   Sig: Apply topically 2 times daily to the worst spots of the affected area   losartan (COZAAR) 25 MG tablet 7/15/2020 at Unknown time  Yes Yes   Sig: Take 25 mg by mouth daily   naproxen sodium (ANAPROX) 220 MG tablet 7/15/2020 at Unknown time  Yes Yes   Sig: Take 220 mg by mouth daily as needed for moderate pain per 's instructions or provider's advice.   pravastatin (PRAVACHOL) 10 MG tablet Past Week at Unknown time  Yes Yes   Sig: Take 1 tablet by mouth once a week    tamsulosin (FLOMAX) 0.4 MG capsule 7/15/2020 at Unknown time  Yes Yes   Sig: Take 0.4 mg by mouth daily       Facility-Administered Medications: None     Allergies   Allergies   Allergen Reactions     Flu Virus Vaccine Anaphylaxis     Altace [Ramipril] Other (See Comments)     Dizziness       Ciprofloxacin Diarrhea     Hmg-Coa-R Inhibitors Muscle Pain (Myalgia)     Statin Intolerance Documentation  1. Unable to tolerate at least 2 statins: Crestor at the lowest starting daily dose and Zocor at any daily dose, due to reported symptoms which are  temporally related to statin treatment and reversible upon statin discontinuation and other causes have been excluded.     Lisinopril Other (See Comments)     Dizziness       Levaquin [Levofloxacin] Unknown     Sulfa Drugs Unknown     Celebrex [Celecoxib] Rash     Hydrocodone Rash     Niacin Itching       Social History   I have reviewed this patient's social history and updated it with pertinent information if needed. Michael Snider  reports that he quit smoking about 19 years ago. His smoking use included cigarettes. He quit after 50.00 years of use. He has never used smokeless tobacco. He reports that he does not drink alcohol.    Family History   I have reviewed this patient's family history and updated it with pertinent information if needed.   Family History   Problem Relation Age of Onset     Cancer No family hx of        Review of Systems   The 10 point Review of Systems is negative other than noted in the HPI.    Physical Exam   Temp: 97.6  F (36.4  C) Temp src: Tympanic BP: 152/78 Pulse: 62   Resp: 18 SpO2: 97 % O2 Device: None (Room air)    Vital Signs with Ranges  Temp:  [97.6  F (36.4  C)-98.6  F (37  C)] 97.6  F (36.4  C)  Pulse:  [62-63] 62  Resp:  [18] 18  BP: (128-152)/(65-78) 152/78  SpO2:  [97 %-98 %] 97 %  0 lbs 0 oz      Awake, alert, pleasant interactive man lying in bed on medical wards.  Speech is clear.  Oriented x3.  Not distractible.  HEENT: Pupils equal, conjugate. No icterus or nystagmus. Oral mucosa moist. No facial asymmetry.   Neck: Supple, jugular veins not elevated. Trachea midline   Chest: No chest wall movement asymmetry. Aeration minimally diminished to right base.. Accessory muscles not in use. Expiratory time not increased. No tidal wheezes. No rhonchi. No discrete crackles.   Cardiac: PMI not displaced. S1, S2 unremarkable. No S3, S4. P2 not accentuated. No murmurs. Carotid upstroke preserved. Carotid amplitude preserved.   Abdomen: Soft. No palpation or percussion  tenderness. No distention. Normoactive bowel sounds. Liver and spleen not increased in size. No bruits, masses, or pulsations.   Extremities: No lower extremity edema.  Right lower extremity in orthotic boot.  Brisk pedal pulse to left lower extremity.  Brisk capillary refill.  Warm distally to both upper extremities and left lower extremity.  No eccymoses, clubbing.   Neurologic: Mental state above. Motor 5/5 and bilaterally equal. Tone preserved. No fasiculations or tremors. Sensation intact to light touch. DTR 2/4 and bilaterally equal.   Data   Data reviewed today: Screening EKG requested.  Low yield for chest imaging not mandated for proposed procedure.  Screening laboratories requested.  Laboratories in the [facility] reviewed.  618 CRP 5.9.  WBC 7.3, hemoglobin 11.5, platelets 150.  CT of the chest apparently in surveillance 3/2020 shows expected volume loss in right upper lobe with expected mediastinal shift.  Recent Labs   Lab 07/15/20  0930   CR 1.16            No results found for this or any previous visit (from the past 24 hour(s)).

## 2020-07-15 NOTE — PHARMACY-VANCOMYCIN DOSING SERVICE
Pharmacy Vancomycin Initial Note  Date of Service July 15, 2020  Patient's  1935  84 year old, male    Indication: Bone and Joint Infection and Osteomyelitis    Current estimated CrCl = Estimated Creatinine Clearance: 52.5 mL/min (based on SCr of 1.16 mg/dL).    Creatinine for last 3 days  7/15/2020:  9:30 AM Creatinine 1.16 mg/dL    Recent Vancomycin Level(s) for last 3 days  No results found for requested labs within last 72 hours.      Vancomycin IV Administrations (past 72 hours)      No vancomycin orders with administrations in past 72 hours.                Nephrotoxins and other renal medications (From now, onward)    Start     Dose/Rate Route Frequency Ordered Stop    07/15/20 1500  piperacillin-tazobactam (ZOSYN) infusion 3.375 g      3.375 g  over 30 Minutes Intravenous EVERY 6 HOURS 07/15/20 0850      07/15/20 1030  vancomycin (VANCOCIN) 1,750 mg in sodium chloride 0.9 % 500 mL intermittent infusion      1,750 mg  250 mL/hr over 2 Hours Intravenous EVERY 12 HOURS 07/15/20 1002            Contrast Orders - past 72 hours (72h ago, onward)    None                Plan:  1. Start vancomycin 1750 mg IV q12h.   2. Goal Trough Level: 15-20 mg/L   3. Pharmacy will check trough levels as appropriate in 1-3 Days. Plan to draw level prior to tomorrow morning's dose. This will not be a true trough since it is not at steady state but will be able to extrapolate it to get an estimated trough.    4. Serum creatinine levels will be ordered daily for the first week of therapy and at least twice weekly for subsequent weeks.    5. [place] method utilized to dose vancomycin therapy: Method 1 and Method 2     Jennyfer Mercedes [facility]

## 2020-07-15 NOTE — ANESTHESIA PREPROCEDURE EVALUATION
Anesthesia Pre-Procedure Evaluation    Patient: Michael Snider   MRN: [id number] : 1935          Preoperative Diagnosis: Acute osteomyelitis of right foot (H) [M86.171]  Diabetic foot infection (H) [E11.628, L08.9]    Procedure(s):  Right foot incision and drainage with amputaion of infected bone    Past Medical History:   Diagnosis Date     AAA (abdominal aortic aneurysm) (H)      Chronic kidney disease (CKD), stage III (moderate) (H)      DM (diabetes mellitus) (H)      Gout      HTN (hypertension)      Hyperlipidemia      Neuropathy in diabetes (H)      Obesity      Shoulder injury     left     Past Surgical History:   Procedure Laterality Date     APPENDECTOMY       EYE SURGERY Right     lens implant     JOINT REPLACEMENT Right      TONSILLECTOMY         Anesthesia Evaluation     . Pt has had prior anesthetic.     No history of anesthetic complications          ROS/MED HX    ENT/Pulmonary:     (+)SALO risk factors hypertension, tobacco use, Past use , . .    Neurologic:     (+)neuropathy - peripheral hands and feet,     Cardiovascular: Comment: S/p AAA repair    (+) Dyslipidemia, hypertension-range: took atenolol this am, ---. : . . . :. .       METS/Exercise Tolerance:  >4 METS   Hematologic:  - neg hematologic  ROS       Musculoskeletal:   (+) arthritis,  -       GI/Hepatic:         Renal/Genitourinary:     (+) chronic renal disease, type: CRI,       Endo:     (+) type II DM Not using insulin - not using insulin pump Normal glucose range: 104 today Diabetic complications: neuropathy, .      Psychiatric:  - neg psychiatric ROS       Infectious Disease:  - neg infectious disease ROS       Malignancy:   (+) Malignancy History of Lung  Lung CA Remission status post Chemo and Radiation.         Other:    (+) No chance of pregnancy C-spine cleared: N/A, no H/O Chronic Pain,no other significant disability                         Physical Exam  Normal systems: cardiovascular and pulmonary    Airway  "  Mallampati: I  TM distance: >3 FB  Neck ROM: limited    Dental   (+) upper dentures, lower dentures and missing    Cardiovascular   Rhythm and rate: regular and normal      Pulmonary    breath sounds clear to auscultation            Lab Results   Component Value Date    WBC 4.6 07/15/2020    HGB 11.5 (L) 07/15/2020    HCT 35.5 (L) 07/15/2020     (L) 07/15/2020    CR 1.16 07/15/2020       Preop Vitals  BP Readings from Last 3 Encounters:   07/15/20 158/78   07/14/20 128/65   03/12/15 (!) 89/57    Pulse Readings from Last 3 Encounters:   07/15/20 62   07/14/20 63   03/12/15 72      Resp Readings from Last 3 Encounters:   07/15/20 16   03/12/15 18   01/07/15 20    SpO2 Readings from Last 3 Encounters:   07/15/20 95%   07/14/20 98%   03/12/15 98%      Temp Readings from Last 1 Encounters:   07/15/20 97.7  F (36.5  C) (Temporal)    Ht Readings from Last 1 Encounters:   07/14/20 1.753 m (5' 9\")      Wt Readings from Last 1 Encounters:   07/14/20 89.8 kg (198 lb)    Estimated body mass index is 29.24 kg/m  as calculated from the following:    Height as of 7/14/20: 1.753 m (5' 9\").    Weight as of 7/14/20: 89.8 kg (198 lb).       Anesthesia Plan      History & Physical Review  History and physical reviewed and following examination; no interval change.    ASA Status:  3 .        Plan for MAC with Intravenous and Propofol induction. Maintenance will be TIVA.  Reason for MAC:  Chronic cardiopulmonary disease (G9), Deep or markedly invasive procedure (G8) and Extreme anxiety (QS)  PONV prophylaxis:  Ondansetron (or other 5HT-3) and Dexamethasone or Solumedrol         Postoperative Care  Postoperative pain management:  IV analgesics.      Consents  Anesthetic plan, risks, benefits and alternatives discussed with:  Patient..                 LUCI Irizarry CRNA  "

## 2020-07-15 NOTE — PLAN OF CARE
Prior to Admission Medication Reconciliation:     Medications added:   [] None  [x] As listed below:    Calcium- per [facility] record, pt confirmed    Medications deleted:   [] None  [x] As listed below: older medications pt has history of being on but is not on currently:    Omeprazole    sucralfate    Changes made to existing medications:   [] None  [x] As listed below:    Input/updated most current strengths and frequencies for maintenance medications    Last times/dates taken verified with patient:  [x] Yes- completed myself  [] Nurse completed no changes made  [] Unable to review with patient:  [] Nurse completed/changes made:       Allergy modifications:    [x]Did not review  []Patient verified NKA  []Patient verified current existing allergies: no changes made  []New allergies: listed below    Medication reconciliation sources:   [x]Patient  []Patient family member/emergency contact: **  [][facility] Report Review  [x]Epic Chart : 7/14/20  Keflex 500mg four times daily prescribed for patient to take tonight and tomorrow morning -- reinforced that this will not treat the infection, but is aimed at preventing worsening of the infection, so it is still important for him to come in tomorrow morning for treatment. He is in agreement with this plan and he says he understands this instruction and that the antibiotics will unlikely fully treat his abscess / infection.  [x]Care Everywhere review  Medication Sig Dispensed Refills Start Date End Date Status   MULTI VITAMIN MENS OR TABS   one daily   0 08/30/2006   Active   aspirin 81 MG chewable tablet   Chew and swallow 81 mg one time a day. Take with food.   0     Active   diabetic shoes   As directed 1 Each   0 07/07/2017   Active   calcium carbonate (CALCIUM 600) 600 MG tablet   Take 1 Tab by mouth one time a day. Take with food.   0 01/23/2019   Active   losartan (Cozaar) 25 MG tablet   Take 1 Tab by mouth one time a day. 30 Tab   11 01/13/2020   Active   pravastatin  (Pravachol) 10 MG tablet   Take 1 Tab by mouth one time a week. 13 Tab   3 02/04/2020   Active   atenolol (Tenormin) 50 MG tablet    Indications: Secondary hypertension, unspecified Take 0.5 Tabs by mouth one time a day. 90 Tab   2 06/15/2020   Active   fluocinonide (Lidex) 0.05 % ointment   Apply topically two times a day. Just to the worst areas 60 g   5 06/15/2020   Active   allopurinol (Zyloprim) 100 MG tablet    Indications: Idiopathic gout, unspecified chronicity, unspecified site Take 2 Tabs by mouth one time a day. 180 Tab   3 06/15/2020   Active   tamsulosin (Flomax) 0.4 MG 24 hour capsule   Take 1 Cap by mouth one time a day. Capsules should be swallowed whole; do not crush, chew, or open. 90 Cap   0 06/15/2020   Active       [x]Outside medication dispense report  Medication Dispense History (from 7/16/2019 to 7/14/2020)   Expand All  Collapse All   Allopurinol      Dispensed  Days Supply  Quantity  Provider  Pharmacy    ALLOPURINOL  TAB 100MG  06/15/2020  90  180 each   [facility] 49 ...          Atenolol      Dispensed  Days Supply  Quantity  Provider  Pharmacy    ATENOLOL     TAB 50MG  06/15/2020  90  45 each   [facility] 4849 ...          Cephalexin      Dispensed  Days Supply  Quantity  Provider  Pharmacy    CEPHALEXIN   CAP 500MG  06/18/2020  7  28 each   [facility] 4849 ...    CEPHALEXIN   CAP 500MG  04/09/2020  10  40 each   [facility] 4849 ...          Fluocinonide      Dispensed  Days Supply  Quantity  Provider  Pharmacy    FLUOCINONIDE 0.05%  OIN  06/15/2020  30  60 g  CARLOS CAMARILLO  [facility] 4849 ...          Losartan Potassium      Dispensed  Days Supply  Quantity  Provider  Pharmacy    LOSARTAN POT TAB 25MG  05/30/2020  90  90 each   [facility] 4849 ...    LOSARTAN POT TAB 25MG  02/29/2020  90  90 each   [facility] 4849 ...    LOSARTAN POT TAB 25MG  01/13/2020  30  30 each   [facility]...          Pravastatin Sodium      Dispensed   Days Supply  Quantity  Provider  Pharmacy    PRAVASTATIN  TAB 10MG  02/29/2020  84  12 each   [facility] 4849 ...          Tamsulosin HCl      Dispensed  Days Supply  Quantity  Provider  Pharmacy    TAMSULOSIN   CAP 0.4MG  06/15/2020  90  90 each   [facility] 4849 ...    TAMSULOSIN   CAP 0.4MG  03/16/2020  90  90 each   [facility] 4849 ...          Other      Dispensed  Days Supply  Quantity  Provider  Pharmacy    DIABETIC SHOE INSERTS  08/07/2019  30  6 each  CARLOS CAMARILLO M...    DIABETIC SHOES  08/07/2019  30  2 each  CARLOS CAMARILLO M...                    []Pharmacy phone call  []Outside meds dispense report  []Nursing home or Assisted Living MAR:  []Other: **    Pharmacy desired at discharge: [facility]    Is patient on coumadin?  [x]No      Requests for consultation by provider or pharmacist:   [x] Patient understands why all of their meds were prescribed and how to take them. No questions.   [x] Fill dates coincide with compliancy for all maintenance meds.   [] Fill dates do not coincide with compliancy with maintenance meds. See notes in PTA medlist about how patient is taking.   [] Patient has questions about the following:      Comments: pt alert and oriented. Manages his own medications. Seemed to know what he was taking his medications for. No concerns.      Katrin Brooks on 7/15/2020 at 8:23 AM       Discrepancies: [x] No []Not Applicable []Yes: listed below    Time spent on medication reconciliation:   []5-20 mins  [x]20-40 mins  []> 40 mins    Issues completing PTA medication reconciliation:  [] On hold for a long time  [] Waited for a call back  [] Fax didn't come through  [] Fax took a long time  [] Other:    Notifying appropriate party of changes/additions/discrepancies:  [x]No pertinent changes made, notification not necessary.   [] Notified attending provider via text page  [] Notified attending provider in person  [] Notified pharmacy  []  Notified nurse  [] Attending provider not available, left detailed notes  [] Changes/additions made don't need provider notification because provider has not seen patient or input any orders  [] Changes/additions made don't need provider notification because changes made are to medications not ordered    Medications Prior to Admission   Medication Sig Dispense Refill Last Dose     allopurinol (ZYLOPRIM) 100 MG tablet Take 200 mg by mouth daily   7/14/2020 at pm     aspirin (ASA) 81 MG chewable tablet Take 81 mg by mouth daily    7/15/2020 at am     atenolol (TENORMIN) 50 MG tablet Take 25 mg by mouth daily   7/15/2020 at am     calcium carbonate (OS-FADY 600 MG [employer] [place]) 600 MG tablet Take 600 mg by mouth daily with food   7/15/2020 at am     cephALEXin (KEFLEX) 500 MG capsule Take 500 mg by mouth 4 times daily    7/15/2020 at 0600     fluocinonide (LIDEX) 0.05 % external ointment Apply topically 2 times daily to the worst spots of the affected area   Past Month at Unknown time     losartan (COZAAR) 25 MG tablet Take 25 mg by mouth daily   7/15/2020 at am     Multiple Vitamin (MULTIVITAMINS PO) Take 1 tablet by mouth daily   7/14/2020 at pm     naproxen sodium (ANAPROX) 220 MG tablet Take 220 mg by mouth daily per 's instructions or provider's advice.    7/15/2020 at am     pravastatin (PRAVACHOL) 10 MG tablet Take 1 tablet by mouth once a week    7/12/2020 at am     tamsulosin (FLOMAX) 0.4 MG capsule Take 0.4 mg by mouth daily    7/15/2020 at am     amoxicillin (AMOXIL) 500 MG capsule Take by mouth as needed (Take before a dental procedure.)    Unknown at Unknown time

## 2020-07-15 NOTE — OP NOTE
[facility] Brief Operative Note    Pre-operative diagnosis: Acute osteomyelitis of right foot (H) [M86.171]  Diabetic foot infection (H) [E11.628, L08.9]   Post-operative diagnosis 1. Chronic osteomyelitis of right foot 2. Diabetic foot infection 3. Abscess of Right foot   Procedure: Right foot incision and drainage with partial first ray amputation   Surgeon: Erin Harris DPM   Assistants(s): None   Estimated blood loss: Less than 50 ml    Specimens: Wound culture, bone culture, bone pathology   Findings: Same as post-op diagnosis       PROCEDURE IN DETAIL:  Under mild sedation, the patient was brought into the OR and placed on the operating room table in a supine position.  A pneumatic ankle tourniquet was placed about the patient's right ankle.  Following IV sedation, local anesthesia was obtained about the Right  foot utilizing a Guzmán block technique with 20  mL of a 1:1 ratio of 0.5% Marcaine plain and 1% lidocaine plain.  The foot was then scrubbed, prepped and draped in the usual aseptic manner.  An Esmarch bandage was utilized to exsanguinate the patient's right foot and the tourniquet was inflated to 250 mmHg.      Attention was directed to the Right  hallux where attention was directed to a small (0.2cm x 0.2cm) wound that was draining purulence on the medial aspect of the 1st metatarsal head. The wound was probed and it was noted to probe straight to the underlying bone with moderate purulence expelling from the wound site. An incision was made along the medial 1st metatarsophalangeal joint to the distal 1/3 of the 1st metatarsal. The incision was carried down to bone. The soft tissues were edematous with mild necrosis and discoloration. Upon reaching the 1st MTPJ, the wound site was probed and a  was used to palpate the tibial sesamoid. The bone was soft, grey, and gave way through the bone with just mild palpation. The tibial sesamoid was excised. The wound was further probed  distally toward the proximal phalanx and a pocket of purulent drainage was identified. Upon probing the proximal aspect of the wound site, another moderate purulent pocket was identified. The head of the 1st metatarsal was examined and was noted to be a very grey color. The metatarsal was palpated with a freer and the freer went through the soft bone and out the lateral aspect of the bone. Purulence then poured from the metatarsal bone at the site where it was palpated. The metatarsal was noted to be fully necrotic to the entire distal 1/3 of the bone and the bone was not salvageable.     At this time, a tennis racket incision was created at the 1st MTPJ. The hallux was amputated at the 1st MTPJ. A bone saw was used to remove the distal 1/3 of the metatarsal in a dorsal distal to plantar proximal direction. Upon removing the distal metatarsal, the remaining bone was till noted to be grey, soft, and non-viable with mild purulence. Another 1cm of bone was removed for excisional debridement of the bone in a dorsal distal to proximal plantar direction. The remaining bone from the first metatarsal was firm in consistency with normal color. The bone marrow was curetted centrally and no further purulence was noted.    There still remained a small portion of non-viable, tissue in the remaining skin flap, so there was excisional debridement of the subcutaneous and muscle/fascia layers of tissue with a 15-blade and scissors.     Next, the area was copiously flushed with a pulsed lavage using 3,000 mL of normal saline mixed with 500,000 units of Polymixin B. Half the solution was used prior to letting the tourniquet down after 35 minutes and the remaining mixture of the pulsed lavage was then used. The wound site thoroughly examined for any remaining purulence or non-viable tissue, and all remaining soft tissues and bone were not noted to be healthy in color and consistency with no purulence. The wound was thoroughly palpated  "and there were no further pockets of purulence.    The wound site was temporarily covered with a lap as an esmarch bandage was wrapped around the foot and the tourniquet was re-inflated.     The plantar skin flap was re-shaped and debulked for accurate approximation of the skin edges. The wound site was healthy in appearance. One vicryl suture was placed in the most proximal aspect of the incision over the proximal aspect of the first metatarsal using 3-0 Vicryl.     The skin edges were then re-approximated using 3-0 Nylon with vertical mattress and simple suture techniques. The tourniquet was released after an additional 30 minutes for a total tourniquet time of 65 minutes. The operative site was dressed with oil emulsion gauze, Dakin's soaked gauze, dry gauze (with dry gauze applied to all interspaces with care not to strangulate the digits), fluffs, Kerlix, and a double long 4\" ACE wrap to the level of the knee.    The patient tolerated the procedure and anesthesia well and was transferred to the recovery room and then back to his hospital room with vital signs stable and vascular status intact to all toes of the Right  foot.  The patient is instructed to minimize weightbearing in a long leg CAM boot while using an assistive walking device. He will be on IV antibiotics until he is discharged home on oral antibiotics.  "

## 2020-07-16 LAB
ANION GAP SERPL CALCULATED.3IONS-SCNC: 3 MMOL/L (ref 3–14)
BACTERIA SPEC CULT: NORMAL
BASOPHILS # BLD AUTO: 0 10E9/L (ref 0–0.2)
BASOPHILS NFR BLD AUTO: 0 %
BUN SERPL-MCNC: 26 MG/DL (ref 7–30)
CALCIUM SERPL-MCNC: 8.9 MG/DL (ref 8.5–10.1)
CHLORIDE SERPL-SCNC: 104 MMOL/L (ref 94–109)
CO2 SERPL-SCNC: 27 MMOL/L (ref 20–32)
CREAT SERPL-MCNC: 1.23 MG/DL (ref 0.66–1.25)
CRP SERPL-MCNC: 68.9 MG/L (ref 0–8)
DIFFERENTIAL METHOD BLD: ABNORMAL
EOSINOPHIL # BLD AUTO: 0 10E9/L (ref 0–0.7)
EOSINOPHIL NFR BLD AUTO: 0 %
ERYTHROCYTE [DISTWIDTH] IN BLOOD BY AUTOMATED COUNT: 13.5 % (ref 10–15)
ERYTHROCYTE [SEDIMENTATION RATE] IN BLOOD BY WESTERGREN METHOD: 65 MM/H (ref 0–20)
GFR SERPL CREATININE-BSD FRML MDRD: 53 ML/MIN/{1.73_M2}
GLUCOSE BLDC GLUCOMTR-MCNC: 112 MG/DL (ref 70–99)
GLUCOSE BLDC GLUCOMTR-MCNC: 117 MG/DL (ref 70–99)
GLUCOSE BLDC GLUCOMTR-MCNC: 126 MG/DL (ref 70–99)
GLUCOSE BLDC GLUCOMTR-MCNC: 170 MG/DL (ref 70–99)
GLUCOSE SERPL-MCNC: 170 MG/DL (ref 70–99)
GRAM STN SPEC: ABNORMAL
HCT VFR BLD AUTO: 32 % (ref 40–53)
HGB BLD-MCNC: 10.7 G/DL (ref 13.3–17.7)
IMM GRANULOCYTES # BLD: 0 10E9/L (ref 0–0.4)
IMM GRANULOCYTES NFR BLD: 0.2 %
LYMPHOCYTES # BLD AUTO: 0.3 10E9/L (ref 0.8–5.3)
LYMPHOCYTES NFR BLD AUTO: 6.5 %
MCH RBC QN AUTO: 31 PG (ref 26.5–33)
MCHC RBC AUTO-ENTMCNC: 33.4 G/DL (ref 31.5–36.5)
MCV RBC AUTO: 93 FL (ref 78–100)
MONOCYTES # BLD AUTO: 0.3 10E9/L (ref 0–1.3)
MONOCYTES NFR BLD AUTO: 7.4 %
NEUTROPHILS # BLD AUTO: 3.6 10E9/L (ref 1.6–8.3)
NEUTROPHILS NFR BLD AUTO: 85.9 %
NRBC # BLD AUTO: 0 10*3/UL
NRBC BLD AUTO-RTO: 0 /100
PLATELET # BLD AUTO: 131 10E9/L (ref 150–450)
POTASSIUM SERPL-SCNC: 4.5 MMOL/L (ref 3.4–5.3)
RBC # BLD AUTO: 3.45 10E12/L (ref 4.4–5.9)
SODIUM SERPL-SCNC: 134 MMOL/L (ref 133–144)
SPECIMEN SOURCE: ABNORMAL
SPECIMEN SOURCE: NORMAL
VANCOMYCIN SERPL-MCNC: 21.2 MG/L
WBC # BLD AUTO: 4.2 10E9/L (ref 4–11)

## 2020-07-16 PROCEDURE — 25000132 ZZH RX MED GY IP 250 OP 250 PS 637: Performed by: INTERNAL MEDICINE

## 2020-07-16 PROCEDURE — 36415 COLL VENOUS BLD VENIPUNCTURE: CPT | Performed by: INTERNAL MEDICINE

## 2020-07-16 PROCEDURE — 25800030 ZZH RX IP 258 OP 636: Performed by: INTERNAL MEDICINE

## 2020-07-16 PROCEDURE — 80048 BASIC METABOLIC PNL TOTAL CA: CPT | Performed by: INTERNAL MEDICINE

## 2020-07-16 PROCEDURE — 85025 COMPLETE CBC W/AUTO DIFF WBC: CPT | Performed by: INTERNAL MEDICINE

## 2020-07-16 PROCEDURE — 12000000 ZZH R&B MED SURG/OB

## 2020-07-16 PROCEDURE — 99232 SBSQ HOSP IP/OBS MODERATE 35: CPT | Mod: 24 | Performed by: INTERNAL MEDICINE

## 2020-07-16 PROCEDURE — 80202 ASSAY OF VANCOMYCIN: CPT | Performed by: INTERNAL MEDICINE

## 2020-07-16 PROCEDURE — 85652 RBC SED RATE AUTOMATED: CPT | Performed by: PODIATRIST

## 2020-07-16 PROCEDURE — 00000146 ZZHCL STATISTIC GLUCOSE BY METER IP

## 2020-07-16 PROCEDURE — 86140 C-REACTIVE PROTEIN: CPT | Performed by: INTERNAL MEDICINE

## 2020-07-16 PROCEDURE — 25000128 H RX IP 250 OP 636: Performed by: INTERNAL MEDICINE

## 2020-07-16 PROCEDURE — 25000132 ZZH RX MED GY IP 250 OP 250 PS 637: Performed by: PODIATRIST

## 2020-07-16 PROCEDURE — 36415 COLL VENOUS BLD VENIPUNCTURE: CPT | Performed by: PODIATRIST

## 2020-07-16 RX ORDER — CEFAZOLIN SODIUM 1 G/50ML
1250 SOLUTION INTRAVENOUS EVERY 12 HOURS
Status: DISCONTINUED | OUTPATIENT
Start: 2020-07-16 | End: 2020-07-17

## 2020-07-16 RX ADMIN — TAZOBACTAM SODIUM AND PIPERACILLIN SODIUM 3.38 G: 375; 3 INJECTION, SOLUTION INTRAVENOUS at 09:39

## 2020-07-16 RX ADMIN — TAZOBACTAM SODIUM AND PIPERACILLIN SODIUM 3.38 G: 375; 3 INJECTION, SOLUTION INTRAVENOUS at 15:38

## 2020-07-16 RX ADMIN — Medication: at 15:37

## 2020-07-16 RX ADMIN — LOSARTAN POTASSIUM 25 MG: 25 TABLET, FILM COATED ORAL at 09:39

## 2020-07-16 RX ADMIN — VANCOMYCIN HYDROCHLORIDE 1250 MG: 1 INJECTION, POWDER, LYOPHILIZED, FOR SOLUTION INTRAVENOUS at 11:36

## 2020-07-16 RX ADMIN — INSULIN ASPART 1 UNITS: 100 INJECTION, SOLUTION INTRAVENOUS; SUBCUTANEOUS at 02:39

## 2020-07-16 RX ADMIN — TAZOBACTAM SODIUM AND PIPERACILLIN SODIUM 3.38 G: 375; 3 INJECTION, SOLUTION INTRAVENOUS at 21:45

## 2020-07-16 RX ADMIN — ALLOPURINOL 200 MG: 100 TABLET ORAL at 08:23

## 2020-07-16 RX ADMIN — ASPIRIN 81 MG 81 MG: 81 TABLET ORAL at 08:23

## 2020-07-16 RX ADMIN — TAMSULOSIN HYDROCHLORIDE 0.4 MG: 0.4 CAPSULE ORAL at 08:23

## 2020-07-16 RX ADMIN — VANCOMYCIN HYDROCHLORIDE 1250 MG: 1 INJECTION, POWDER, LYOPHILIZED, FOR SOLUTION INTRAVENOUS at 23:36

## 2020-07-16 RX ADMIN — ATENOLOL 25 MG: 25 TABLET ORAL at 09:39

## 2020-07-16 RX ADMIN — TAZOBACTAM SODIUM AND PIPERACILLIN SODIUM 3.38 G: 375; 3 INJECTION, SOLUTION INTRAVENOUS at 02:45

## 2020-07-16 RX ADMIN — INSULIN ASPART 1 UNITS: 100 INJECTION, SOLUTION INTRAVENOUS; SUBCUTANEOUS at 06:15

## 2020-07-16 ASSESSMENT — ACTIVITIES OF DAILY LIVING (ADL)
ADLS_ACUITY_SCORE: 14
ADLS_ACUITY_SCORE: 12
ADLS_ACUITY_SCORE: 14

## 2020-07-16 NOTE — PROGRESS NOTES
[facility]    Hospitalist Progress Note    Date of Service (when I saw the patient): 07/16/2020    Assessment & Plan   Michael Snider is a 84 year old  man who was admitted on 7/15/2020.  He presented for planned operative incision/drainage and likely partial foot amputation for osteomyelitis.  He was evaluated by Dr. Harris yesterday.  He has carries an approximately 6-month history of ulcers to his right foot which is he recalls never fully resolved.  Approximately a week ago he was offered options of intravenous antibiotic treatment versus operative evaluation which would have required him to travel to [place].  He has substantial family responsibilities with his wife currently being treated on the hospice program.  He declined travel to [place] or operation.  His wound worsened.  He was evaluated by Dr. Harris yesterday.  He is made arrangements for care of his wife.  In other regards he is not had any significant other prodromal symptoms.     1.  Osteomyelitis  Partial first ray amputation yesterday.  Dr. Briscoe plans reevaluation of wound tomorrow and depending on findings consideration for outpatient treatment at that time.  Gram stain and preliminary cultures show staph aureus.  For the time being continue vancomycin and piperacillin/tazobactam.  Prefer to avoid monotherapy as if beta-lactam treatment possible may be somewhat more efficacious.  Will discuss further with Dr Briscoe.  If all devitalized bone resected he may not require prolonged intravenous antibiotic treatment.  2.  Diabetes mellitus  Usually diet controlled.  He has required low doses of insulin.  Have discussed with him that this is not unexpected in the context of acute illness and long term insulin therapy may not be required.  Continuing Subcutaneous short acting insulin via sliding scale for control of glucose during operative period and hospitalization.  3.  Peripheral arterial disease  Large vessel atherosclerotic vascular  disease  Nonocclusive coronary artery disease  Have resumed aspirin depending on findings at operation.  Continue beta-blockade with his usual atenolol.     4.  Dyslipidemia  Statin intolerance.  PTA medication list indicates weekly simvastatin.      Active Problems:    Acute osteomyelitis of right foot (H)    Diabetic foot infection (H)    Osteomyelitis (H)       DVT Prophylaxis: Peripheral arterial disease; recent operative procedure; encourage mobilization.  Code Status: No Order    Disposition: Expected discharge predicted another 24 hours depending on his postoperative course and appearance of wound.    Nick Morataya    Interval History   Awake and alert.  No dyspnea.  No pain    -Data reviewed today: I reviewed all new labs and imaging results over the last 24 hours.     Peripheral IV 07/15/20 Left Lower forearm (Active)   Site Assessment WDL 07/16/20 0808   Line Status Infusing 07/16/20 0808   Phlebitis Scale 0-->no symptoms 07/16/20 0808   Infiltration Scale 0 07/16/20 0808   Extravasation? No 07/16/20 0808   Number of days: 1       Incision/Surgical Site 07/15/20 Right Foot (Active)   Incision Assessment UTV 07/16/20 0808   Nafisa-Incision Assessment UTV 07/16/20 0808   Closure Sutures 07/15/20 1541   Incision Drainage Amount UTV 07/16/20 0808   Drainage Description UTV 07/16/20 0808   Dressing Intervention Clean, dry, intact 07/16/20 0808   Number of days: 1     Line/device assessment(s) completed for medical necessity July 16    Physical Exam   Temp: 98.1  F (36.7  C) Temp src: Tympanic BP: (!) 109/49 Pulse: 58 Heart Rate: 50 Resp: 16 SpO2: 97 % O2 Device: None (Room air) Oxygen Delivery: 2 LPM  Vitals:    07/15/20 1755   Weight: 84.2 kg (185 lb 10 oz)     Vital Signs with Ranges  Temp:  [96.1  F (35.6  C)-98.1  F (36.7  C)] 98.1  F (36.7  C)  Pulse:  [55-66] 58  Heart Rate:  [50-60] 50  Resp:  [13-19] 16  BP: ()/() 109/49  SpO2:  [93 %-99 %] 97 %  I/O last 3 completed shifts:  In: 2450  [P.O.:600; I.V.:1850]  Out: 785 [Urine:775; Blood:10]    Awake, alert, pleasant interactive man lying in bed on medical wards.  Speech is clear.  Oriented x3.  HEENT: Pupils equal, conjugate. No icterus or nystagmus. Oral mucosa moist. No facial asymmetry.   Neck: Supple, jugular veins not elevated. Trachea midline   Chest: No chest wall movement asymmetry. Aeration preserved to bases. Accessory muscles not in use. Expiratory time not increased. No tidal wheezes. No rhonchi. No discrete crackles.   Cardiac: PMI not displaced. S1, S2 unremarkable. No S3, S4. P2 not accentuated. No murmurs.  Abdomen: Soft. No palpation or percussion tenderness. No distention. Normoactive bowel sounds.  Extremities: Right lower extremity dressed with boot in place.  Left lower extremity with easily palpable brisk pulse.  Warm distally.  No eccymoses, clubbing.   Neurologic: Mental state above. Motor 5/5 and bilaterally equal. Tone preserved. No fasiculations or tremors.    Medications     - MEDICATION INSTRUCTIONS -       no pre procedure antibiotic needed       sodium chloride 50 mL/hr at 07/15/20 1715       allopurinol  200 mg Oral Daily     aspirin  81 mg Oral Daily     atenolol  25 mg Oral Daily     insulin aspart  1-6 Units Subcutaneous TID w/meals     losartan  25 mg Oral Daily     piperacillin-tazobactam  3.375 g Intravenous Q6H     irrigation builder (choose ingredients)   Irrigation On Call to OR     sodium chloride (PF)  3 mL Intracatheter Q8H     sodium hypochlorite   Topical On Call to OR     tamsulosin  0.4 mg Oral Daily     vancomycin (VANCOCIN) IV  1,250 mg Intravenous Q12H           Data   Recent Labs   Lab 07/16/20  0522 07/15/20  0931 07/15/20  0930   WBC 4.2 4.6  --    HGB 10.7* 11.5*  --    MCV 93 95  --    * 144*  --     136  --    POTASSIUM 4.5 4.2  --    CHLORIDE 104 104  --    CO2 27 31  --    BUN 26 27  --    CR 1.23 1.19 1.16   ANIONGAP 3 1*  --    FADY 8.9 10.0  --    * 125*  --              Recent Results (from the past 24 hour(s))   XR Foot Port Right 3 Views    Narrative    XR FOOT PORT RT 3 VW    HISTORY: 84 years Male Patient is either in PACU or back on the floor  -- patient is post-op partial first ray amputation    COMPARISON: 7/15/2020    TECHNIQUE:    FINDINGS: There has been interval amputation of the great toe to the  level of the proximal aspect of the metatarsal. A bandage is been  applied. There is soft tissue edema.      Impression    IMPRESSION: Postoperative changes of great toe amputation to the level  of the proximal first metatarsal.    CARLEE SHAY MD

## 2020-07-16 NOTE — PLAN OF CARE
Here POD #1 for osteomyelitis of RLE w/ first ray amputation by Dr. Harris. Pt is A&O w/ soft BPs 100s/40s & bradycardia ranging 40s-50s, but otherwise VSS. Satting 97% on RA w/ no complaints of SOB or chest pain. Denies pain. IV running NS at 50 mL/hr. IV Zosyn & Vancomycin. LSs are diminished. N&T to extremities. Ate breakfast, but wanted to skip lunch. BG of 126 at 1141. Dressing change to RLE completed per MD orders. Site has serous drainage noted to incision. Pt helpful w/ dressing change & tolerated well w/ no pain reported. Bed in lowest position. Call light in reach. ID band in place. Makes needs known. Free from falls.

## 2020-07-16 NOTE — PLAN OF CARE
VSS, afebrile. Lungs clear, bowel sounds active, passing flatus. RLE dressing remains C/D/I, boot in place, patient reports chronic numbness (neuropathy), denies pain. Patient did not get out of bed this shift, used urinal. Placed gait belt and walker in patient's room. Turned alarms on at start of shift. NS @ 50 ml/hr, continues on IV Zosyn and IV Vancomycin. Blood glucose during the night 170 & 170, sliding scale coverage per MAR. Alarms on, call light within reach, makes needs known.      Face to face report given with opportunity to observe patient.    Report given to Maco Neri RN   7/16/2020  7:05 AM

## 2020-07-16 NOTE — PHARMACY-VANCOMYCIN DOSING SERVICE
Pharmacy Vancomycin Note  Date of Service 2020  Patient's  1935   84 year old, male    Indication: Bone and Joint Infection and Osteomyelitis  Goal Trough Level: 15-20 mg/L  Day of Therapy: 2  Current Vancomycin regimen: 1750 mg IV q12h    Current estimated CrCl = Estimated Creatinine Clearance: 53.2 mL/min (based on SCr of 1.23 mg/dL).    Creatinine for last 3 days  7/15/2020:  9:30 AM Creatinine 1.16 mg/dL;  9:31 AM Creatinine 1.19 mg/dL  2020:  5:22 AM Creatinine 1.23 mg/dL    Recent Vancomycin Levels (past 3 days)  2020:  9:34 AM Vancomycin Level 21.2 mg/L This is not at steady state, likely would be much higher in another day     Vancomycin IV Administrations (past 72 hours)                   vancomycin (VANCOCIN) 1,750 mg in sodium chloride 0.9 % 500 mL intermittent infusion (mg) 1,750 mg Given 07/15/20 2204     1,750 mg Given  1054                Nephrotoxins and other renal medications (From now, onward)    Start     Dose/Rate Route Frequency Ordered Stop    20 1130  vancomycin (VANCOCIN) 1,250 mg in sodium chloride 0.9 % 250 mL intermittent infusion      1,250 mg  166.7 mL/hr over 90 Minutes Intravenous EVERY 12 HOURS 20 1033      07/15/20 1500  piperacillin-tazobactam (ZOSYN) infusion 3.375 g      3.375 g  over 30 Minutes Intravenous EVERY 6 HOURS 07/15/20 0850               Contrast Orders - past 72 hours (72h ago, onward)    None          Interpretation of levels and current regimen:  Trough level is  Supratherapeutic    Has serum creatinine changed > 50% in last 72 hours: No    Renal Function: Stable    Plan:  1. Adjust to 1250 mg q12h.   2. Pharmacy will check trough levels as appropriate in 1-3 Days.  Likely will draw another one prior to  1130 dose.   3. Serum creatinine levels will be ordered daily for the first week of therapy and at least twice weekly for subsequent weeks.      Jennyfer Mercedes RPH        .

## 2020-07-17 ENCOUNTER — APPOINTMENT (OUTPATIENT)
Dept: PHYSICAL THERAPY | Facility: HOSPITAL | Age: 85
DRG: 617 | End: 2020-07-17
Attending: INTERNAL MEDICINE
Payer: MEDICARE

## 2020-07-17 VITALS
HEIGHT: 69 IN | DIASTOLIC BLOOD PRESSURE: 65 MMHG | OXYGEN SATURATION: 94 % | HEART RATE: 67 BPM | RESPIRATION RATE: 16 BRPM | TEMPERATURE: 97.4 F | WEIGHT: 185.63 LBS | BODY MASS INDEX: 27.49 KG/M2 | SYSTOLIC BLOOD PRESSURE: 143 MMHG

## 2020-07-17 PROBLEM — E11.22 TYPE 2 DIABETES MELLITUS WITH STAGE 3 CHRONIC KIDNEY DISEASE, WITHOUT LONG-TERM CURRENT USE OF INSULIN (H): Status: ACTIVE | Noted: 2017-01-06

## 2020-07-17 PROBLEM — I71.40 ABDOMINAL AORTIC ANEURYSM (AAA) WITHOUT RUPTURE (H): Status: ACTIVE | Noted: 2020-02-25

## 2020-07-17 PROBLEM — N18.30 TYPE 2 DIABETES MELLITUS WITH STAGE 3 CHRONIC KIDNEY DISEASE, WITHOUT LONG-TERM CURRENT USE OF INSULIN (H): Status: ACTIVE | Noted: 2017-01-06

## 2020-07-17 LAB
ANION GAP SERPL CALCULATED.3IONS-SCNC: 1 MMOL/L (ref 3–14)
BASOPHILS # BLD AUTO: 0 10E9/L (ref 0–0.2)
BASOPHILS NFR BLD AUTO: 0.4 %
BUN SERPL-MCNC: 22 MG/DL (ref 7–30)
CALCIUM SERPL-MCNC: 8.8 MG/DL (ref 8.5–10.1)
CHLORIDE SERPL-SCNC: 107 MMOL/L (ref 94–109)
CO2 SERPL-SCNC: 29 MMOL/L (ref 20–32)
CREAT SERPL-MCNC: 1.22 MG/DL (ref 0.66–1.25)
CRP SERPL-MCNC: 39.8 MG/L (ref 0–8)
DIFFERENTIAL METHOD BLD: ABNORMAL
EOSINOPHIL # BLD AUTO: 0.3 10E9/L (ref 0–0.7)
EOSINOPHIL NFR BLD AUTO: 5.5 %
ERYTHROCYTE [DISTWIDTH] IN BLOOD BY AUTOMATED COUNT: 13.9 % (ref 10–15)
GFR SERPL CREATININE-BSD FRML MDRD: 54 ML/MIN/{1.73_M2}
GLUCOSE BLDC GLUCOMTR-MCNC: 120 MG/DL (ref 70–99)
GLUCOSE BLDC GLUCOMTR-MCNC: 99 MG/DL (ref 70–99)
GLUCOSE SERPL-MCNC: 104 MG/DL (ref 70–99)
HCT VFR BLD AUTO: 32.7 % (ref 40–53)
HGB BLD-MCNC: 10.7 G/DL (ref 13.3–17.7)
IMM GRANULOCYTES # BLD: 0 10E9/L (ref 0–0.4)
IMM GRANULOCYTES NFR BLD: 0.2 %
LYMPHOCYTES # BLD AUTO: 0.6 10E9/L (ref 0.8–5.3)
LYMPHOCYTES NFR BLD AUTO: 12.3 %
MCH RBC QN AUTO: 31 PG (ref 26.5–33)
MCHC RBC AUTO-ENTMCNC: 32.7 G/DL (ref 31.5–36.5)
MCV RBC AUTO: 95 FL (ref 78–100)
MONOCYTES # BLD AUTO: 0.4 10E9/L (ref 0–1.3)
MONOCYTES NFR BLD AUTO: 8.9 %
NEUTROPHILS # BLD AUTO: 3.6 10E9/L (ref 1.6–8.3)
NEUTROPHILS NFR BLD AUTO: 72.7 %
NRBC # BLD AUTO: 0 10*3/UL
NRBC BLD AUTO-RTO: 0 /100
PLATELET # BLD AUTO: 140 10E9/L (ref 150–450)
POTASSIUM SERPL-SCNC: 4 MMOL/L (ref 3.4–5.3)
RBC # BLD AUTO: 3.45 10E12/L (ref 4.4–5.9)
SODIUM SERPL-SCNC: 137 MMOL/L (ref 133–144)
VANCOMYCIN SERPL-MCNC: 27 MG/L
WBC # BLD AUTO: 4.9 10E9/L (ref 4–11)

## 2020-07-17 PROCEDURE — 99239 HOSP IP/OBS DSCHRG MGMT >30: CPT | Mod: 24 | Performed by: NURSE PRACTITIONER

## 2020-07-17 PROCEDURE — 85025 COMPLETE CBC W/AUTO DIFF WBC: CPT | Performed by: INTERNAL MEDICINE

## 2020-07-17 PROCEDURE — 00000146 ZZHCL STATISTIC GLUCOSE BY METER IP

## 2020-07-17 PROCEDURE — 80202 ASSAY OF VANCOMYCIN: CPT | Performed by: NURSE PRACTITIONER

## 2020-07-17 PROCEDURE — 97116 GAIT TRAINING THERAPY: CPT | Mod: GP

## 2020-07-17 PROCEDURE — 25000128 H RX IP 250 OP 636: Performed by: INTERNAL MEDICINE

## 2020-07-17 PROCEDURE — 25000132 ZZH RX MED GY IP 250 OP 250 PS 637: Performed by: NURSE PRACTITIONER

## 2020-07-17 PROCEDURE — 36415 COLL VENOUS BLD VENIPUNCTURE: CPT | Performed by: NURSE PRACTITIONER

## 2020-07-17 PROCEDURE — 97161 PT EVAL LOW COMPLEX 20 MIN: CPT | Mod: GP

## 2020-07-17 PROCEDURE — 25000132 ZZH RX MED GY IP 250 OP 250 PS 637: Performed by: INTERNAL MEDICINE

## 2020-07-17 PROCEDURE — 80048 BASIC METABOLIC PNL TOTAL CA: CPT | Performed by: INTERNAL MEDICINE

## 2020-07-17 PROCEDURE — 36415 COLL VENOUS BLD VENIPUNCTURE: CPT | Performed by: INTERNAL MEDICINE

## 2020-07-17 PROCEDURE — 86140 C-REACTIVE PROTEIN: CPT | Performed by: NURSE PRACTITIONER

## 2020-07-17 RX ORDER — ATENOLOL 25 MG/1
12.5 TABLET ORAL DAILY
Qty: 30 TABLET | Refills: 0 | Status: SHIPPED | OUTPATIENT
Start: 2020-07-17 | End: 2022-01-01

## 2020-07-17 RX ORDER — CEFAZOLIN SODIUM 1 G/50ML
1250 SOLUTION INTRAVENOUS
Status: DISCONTINUED | OUTPATIENT
Start: 2020-07-17 | End: 2020-07-17

## 2020-07-17 RX ORDER — ATENOLOL 25 MG/1
12.5 TABLET ORAL DAILY
Status: DISCONTINUED | OUTPATIENT
Start: 2020-07-17 | End: 2020-07-17 | Stop reason: HOSPADM

## 2020-07-17 RX ADMIN — TAZOBACTAM SODIUM AND PIPERACILLIN SODIUM 3.38 G: 375; 3 INJECTION, SOLUTION INTRAVENOUS at 03:13

## 2020-07-17 RX ADMIN — ALLOPURINOL 200 MG: 100 TABLET ORAL at 07:57

## 2020-07-17 RX ADMIN — LOSARTAN POTASSIUM 25 MG: 25 TABLET, FILM COATED ORAL at 10:45

## 2020-07-17 RX ADMIN — ATENOLOL 12.5 MG: 25 TABLET ORAL at 10:45

## 2020-07-17 RX ADMIN — ASPIRIN 81 MG 81 MG: 81 TABLET ORAL at 07:58

## 2020-07-17 RX ADMIN — TAMSULOSIN HYDROCHLORIDE 0.4 MG: 0.4 CAPSULE ORAL at 07:58

## 2020-07-17 RX ADMIN — TAZOBACTAM SODIUM AND PIPERACILLIN SODIUM 3.38 G: 375; 3 INJECTION, SOLUTION INTRAVENOUS at 10:42

## 2020-07-17 ASSESSMENT — ACTIVITIES OF DAILY LIVING (ADL)
ADLS_ACUITY_SCORE: 14
ADLS_ACUITY_SCORE: 16

## 2020-07-17 NOTE — PHARMACY-VANCOMYCIN DOSING SERVICE
Pharmacy Vancomycin Note  Date of Service 2020  Patient's  1935   84 year old, male    Indication: Bone and Joint Infection and Osteomyelitis  Goal Trough Level: 15-20 mg/L  Day of Therapy: 3  Current Vancomycin regimen:  1250 mg IV q12h    Current estimated CrCl = Estimated Creatinine Clearance: 53.7 mL/min (based on SCr of 1.22 mg/dL).    Creatinine for last 3 days  7/15/2020:  9:30 AM Creatinine 1.16 mg/dL;  9:31 AM Creatinine 1.19 mg/dL  2020:  5:22 AM Creatinine 1.23 mg/dL  2020:  5:20 AM Creatinine 1.22 mg/dL    Recent Vancomycin Levels (past 3 days)  2020: 9:34 AM Vancomycin Level 21.2 mg/L Was getting 1750 mg q12h --> adjusted to 1250 mg q12h after this level came back  2020: 10:36 AM Vancomycin Level 27.0 mg/L     Vancomycin IV Administrations (past 72 hours)                   vancomycin (VANCOCIN) 1,250 mg in sodium chloride 0.9 % 250 mL intermittent infusion (mg) 1,250 mg New Bag 20 2336     1,250 mg New Bag  1136    vancomycin (VANCOCIN) 1,750 mg in sodium chloride 0.9 % 500 mL intermittent infusion (mg) 1,750 mg Given 07/15/20 2204     1,750 mg Given  1054                Nephrotoxins and other renal medications (From now, onward)    Start     Dose/Rate Route Frequency Ordered Stop    20 2230  vancomycin (VANCOCIN) 1,250 mg in sodium chloride 0.9 % 250 mL intermittent infusion      1,250 mg  over 90 Minutes Intravenous EVERY 18 HOURS 20 1123      07/15/20 1500  piperacillin-tazobactam (ZOSYN) infusion 3.375 g      3.375 g  over 30 Minutes Intravenous EVERY 6 HOURS 07/15/20 0850               Contrast Orders - past 72 hours (72h ago, onward)    None          Interpretation of levels and current regimen:  Trough level is  Supratherapeutic    Has serum creatinine changed > 50% in last 72 hours: No    Renal Function: Stable    Plan:   Patient is being discharged, vanco discontinued by provider.     Jennyfer Mercedes RPH        .

## 2020-07-17 NOTE — PROVIDER NOTIFICATION
[id number] Clementina CORRIGAN NP notified of HR 47. May give cozaar as ordered; will adjust dose atenolol.

## 2020-07-17 NOTE — DISCHARGE SUMMARY
[facility]    Discharge Summary  Hospitalist    Date of Admission:  7/15/2020  Date of Discharge:  7/17/2020  1:15 PM  Discharging Provider: Natasha Stout CNP  Date of Service (when I saw the patient): 07/17/20    Discharge Diagnoses   Active Problems:    Acute osteomyelitis of right foot (H)    Diabetic foot infection (H)    Diabetic polyneuropathy associated with type 2 diabetes mellitus (H)    Hypertension associated with diabetes (H)    Type 2 diabetes mellitus with stage 3 chronic kidney disease, without long-term current use of insulin (H)      History of Present Illness   From admission: Michael Snider is a 84 year old man who presents for planned operative incision/drainage and likely partial foot amputation for osteomyelitis.  He was evaluated by Dr. Harris yesterday.  He has carries an approximately 6-month history of ulcers to his right foot which is he recalls never fully resolved.  Approximately a week ago he was offered options of intravenous antibiotic treatment versus operative evaluation which would have required him to travel to [place].  He has substantial family responsibilities with his wife currently being treated on the hospice program.  He declined travel to [place] or operation.  His wound worsened.  He was evaluated by Dr. Harris yesterday.  He is made arrangements for care of his wife.  In other regards he is not had any significant other prodromal symptoms.    Hospital Course       Osteomyelitis  Partial first ray amputation 7/15.  Gram stain and preliminary cultures show staph aureus.  Dr. Harris changed dressing and is satisfied with wound. Will discharge on Keflex. Follow-up planned for 7/22/20.     Diabetes mellitus  Usually diet controlled.  He has required low doses of insulin while here. However with source of infection now gone, anticipate he will not have any significant hyperglycemia at home.     Peripheral arterial disease  Large vessel atherosclerotic  vascular disease  Nonocclusive coronary artery disease  Resume aspirin. Did need to reduce BB as hi heart rate consistently lower 40's.       Hypertension:Continue losartan and lower dose of atenolol. His losartan may need to be increased if he develps worsening hypertension with slightly reduced dose of atenolol.     Natasha Stout, CNP        Pending Results     Unresulted Labs Ordered in the Past 30 Days of this Admission     Date and Time Order Name Status Description    7/15/2020 1516 Fungus Culture, non-blood Preliminary     7/15/2020 1516 Bone Culture Aerobic Bacterial Preliminary     7/15/2020 1502 Surgical pathology exam In process     7/15/2020 1454 Wound Culture Aerobic Bacterial Preliminary     7/15/2020 1454 Fungus Culture, non-blood Preliminary     7/15/2020 1454 Anaerobic bacterial culture Preliminary           Code Status   Full Code       Primary Care Physician   CARLOS CAMARILLO          Discharge Disposition   Discharged to home  Condition at discharge: Stable    Consultations This Hospital Stay   PHARMACY TO DOSE VANCO  PHYSICAL THERAPY ADULT IP CONSULT    Time Spent on this Encounter   I, Natasha Stout NP, personally saw the patient today and spent greater than 30 minutes discharging this patient.    Discharge Orders      Reason for your hospital stay    Diabetic foot infection     Follow-up and recommended labs and tests     Follow up with Dr. Harris as scheduled on Wednesday.     Activity    Your activity upon discharge: activity as tolerated. Wear boot at all times while up. Avoid bearing weight on the front of the foot as much as possible.     When to contact your care team    Call your primary doctor if you have any of the following: fever, excessive drainage on dressing, pain to foot, new or unusual symptoms     Wound care and dressings    Instructions to care for your wound at home: keep dressing clean adr dry, do not change. It will be changed at your follow-up appointment with  Dr. Harris.     Discharge Instructions    Your atenolol medication (blood pressure/heart)was decreased due to consistently low heart rates.     No CPR- Do NOT Intubate     Diet    Follow this diet upon discharge: Orders Placed This Encounter      Consistent Carbohydrate Diet 0505-1087 Calories: High Consistent CHO (4-7 CHO units/meal)     Discharge Medications   Current Discharge Medication List      CONTINUE these medications which have CHANGED    Details   atenolol (TENORMIN) 25 MG tablet Take 0.5 tablets (12.5 mg) by mouth daily  Qty: 30 tablet, Refills: 0    Associated Diagnoses: Hypertension associated with diabetes (H)         CONTINUE these medications which have NOT CHANGED    Details   allopurinol (ZYLOPRIM) 100 MG tablet Take 200 mg by mouth daily      aspirin (ASA) 81 MG chewable tablet Take 81 mg by mouth daily       calcium carbonate (OS-FADY 600 MG [place]. [place]) 600 MG tablet Take 600 mg by mouth daily with food      cephALEXin (KEFLEX) 500 MG capsule Take 500 mg by mouth 4 times daily       fluocinonide (LIDEX) 0.05 % external ointment Apply topically 2 times daily to the worst spots of the affected area      losartan (COZAAR) 25 MG tablet Take 25 mg by mouth daily      Multiple Vitamin (MULTIVITAMINS PO) Take 1 tablet by mouth daily      naproxen sodium (ANAPROX) 220 MG tablet Take 220 mg by mouth daily per 's instructions or provider's advice.       pravastatin (PRAVACHOL) 10 MG tablet Take 1 tablet by mouth once a week       tamsulosin (FLOMAX) 0.4 MG capsule Take 0.4 mg by mouth daily       amoxicillin (AMOXIL) 500 MG capsule Take by mouth as needed (Take before a dental procedure.)            Allergies   Allergies   Allergen Reactions     Flu Virus Vaccine Anaphylaxis     Altace [Ramipril] Other (See Comments)     Dizziness       Ciprofloxacin Diarrhea     Hmg-Coa-R Inhibitors Muscle Pain (Myalgia)     Statin Intolerance Documentation  1. Unable to tolerate at least 2 statins: Crestor at  the lowest starting daily dose and Zocor at any daily dose, due to reported symptoms which are temporally related to statin treatment and reversible upon statin discontinuation and other causes have been excluded.     Lisinopril Other (See Comments)     Dizziness       Levaquin [Levofloxacin] Unknown     Sulfa Drugs Unknown     Celebrex [Celecoxib] Rash     Hydrocodone Rash     Niacin Itching     Data   Most Recent 3 CBC's:  Recent Labs   Lab Test 07/17/20  0520 07/16/20  0522 07/15/20  0931   WBC 4.9 4.2 4.6   HGB 10.7* 10.7* 11.5*   MCV 95 93 95   * 131* 144*      Most Recent 3 BMP's:  Recent Labs   Lab Test 07/17/20  0520 07/16/20  0522 07/15/20  0931    134 136   POTASSIUM 4.0 4.5 4.2   CHLORIDE 107 104 104   CO2 29 27 31   BUN 22 26 27   CR 1.22 1.23 1.19   ANIONGAP 1* 3 1*   FADY 8.8 8.9 10.0   * 170* 125*     Most Recent 2 LFT's:No lab results found.  Most Recent INR's and Anticoagulation Dosing History:  Anticoagulation Dose History     There is no flowsheet data to display.        Most Recent 3 Troponin's:No lab results found.  Most Recent Cholesterol Panel:No lab results found.  Most Recent 6 Bacteria Isolates From Any Culture (See EPIC Reports for Culture Details):  Recent Labs   Lab Test 07/15/20  1515 07/15/20  1454 07/15/20  0949   CULT Culture negative after 13 hours  Moderate growth  Staphylococcus aureus  Susceptibility testing in progress  *  Culture in progress Culture negative after 14 hours  Culture in progress  Moderate growth  Staphylococcus aureus  *  Culture in progress No MRSA isolated     Most Recent TSH, T4 and A1c Labs:No lab results found.  Results for orders placed or performed during the hospital encounter of 07/15/20   XR Foot Port Right 3 Views    Narrative    PROCEDURE:  XR FOOT PORT RT 3 VW    HISTORY: Patient has an abscess of the Right 1st MTPJ with confirmed  osteomyelitis of the tibial sesamoid and medial 1st met head from an  MRI on 07/02/2020 at  [facility] -- please evaluate bone before surgery  today on 07/15/2020    COMPARISON:  None.    TECHNIQUE:  3 views of the right foot were obtained.    FINDINGS:  There is extensive soft tissue swelling in the region of  the right first metatarsophalangeal joint. There is mild  demineralization of the distal first metatarsal medially. No plain  film changes are seen in the great toe. The remainder of the foot  appears intact.       Impression    IMPRESSION: Demineralization in the distal first metatarsal consistent  with the MRI diagnosis of osteomyelitis      JONO DSOUZA MD   XR Foot Port Right 3 Views    Narrative    XR FOOT PORT RT 3 VW    HISTORY: 84 years Male Patient is either in PACU or back on the floor  -- patient is post-op partial first ray amputation    COMPARISON: 7/15/2020    TECHNIQUE:    FINDINGS: There has been interval amputation of the great toe to the  level of the proximal aspect of the metatarsal. A bandage is been  applied. There is soft tissue edema.      Impression    IMPRESSION: Postoperative changes of great toe amputation to the level  of the proximal first metatarsal.    CARLEE SHAY MD

## 2020-07-17 NOTE — PLAN OF CARE
Face to face report given with opportunity to observe patient.    Report given to Shayan Neri RN   7/17/2020  7:26 AM

## 2020-07-17 NOTE — PROGRESS NOTES
Met with Papi, he is very happy he is being discharged today. Updated [facility] that they are not needed right now.     Lindsey Mackenzie CM

## 2020-07-17 NOTE — PLAN OF CARE
A&O. VSS/AF Bradycardia per baseline. Atenolol given 1/2 dose per NP; see note. Denies pain/discomfort. Wearing orthotic boot per MD instructions with heel walking; tolerates well. Dr Harris here to change dressing.  prior to discharge, not eating lunch.  Patient discharged at 1300PM via wheel chair accompanied by son and staff. Prescriptions sent to patients preferred pharmacy. All belongings sent with patient.     Discharge instructions reviewed with patient. Listed belongings gathered and returned to patient. yes    Patient discharged to home.   Report called to     Core Measures and Vaccines  Core Measures applicable during stay: Yes. If yes, state diagnosis:     Pneumonia and Influenza given prior to discharge, if indicated: N/A    Surgical Patient   Surgical Procedures during stay: Yes  Did patient receive discharge instruction on wound care and recognition of infection symptoms? Yes    MISC  Follow up appointment made:  Yes  Home and hospital aquired medications returned to patient: N/A  Patient reports pain was well managed at discharge: Yes

## 2020-07-17 NOTE — PROGRESS NOTES
"Chief complaint: Patient presents with:  Post-op RIGHT foot partial first ray amputation      History of Present Illness: This 84 year old male is seen at bedside following a RIGHT partial first ray amputation with I&D on 07/15/2020. Patient says he has had no pain in the foot since the amputation. He has been elevating his foot and walking only with a CAM walker boot on his leg. He is wondering if he can be discharged today. No further pedal complaints today.     History of wound:  Patient says he has had the ulcer for six months. He says he originally had one ulcer, but then it became two ulcers. He has been on two rounds of Keflex in the past for the ulceration. He says he has been seeing Dr. Kiser for this wound. He has a Pressure Guardian Boot from [facility] in [place], [place]. He has been wearing it consistently, but his wound became infected before the skin healed over the infected ulcer.     Patient says he most recently saw Dr. Kiser on 07/09/2020. He reviewed an MRI of the RIGHT foot with the patient (MRI from 07/02/2020) and he was told his bone was infected. It was recommended he have a surgical I&D with IV antibiotics, but the patient refused stating this was not an option because his wife has dementia and she cannot be left alone for long periods of time. An ID consult was placed, but the patient says he was never contacted by ID. The patient presented to [facility] on 07/15/2020 due to concerns that the foot had worsened since seeing Dr. Kiser and he wanted treatment of the wound.    Patient does not use tobacco products.     Last HbA1C was 6.0% on 01/13/2020.        /65   Pulse 67   Temp 97.4  F (36.3  C) (Tympanic)   Resp 16   Ht 1.753 m (5' 9\")   Wt 84.2 kg (185 lb 10 oz)   SpO2 94%   BMI 27.41 kg/m      Patient Active Problem List   Diagnosis     Acute osteomyelitis of right foot (H)     Diabetic foot infection (H)     Osteomyelitis (H)     Abdominal " aortic aneurysm (AAA) without rupture (H)     Benign non-nodular prostatic hyperplasia with lower urinary tract symptoms     Diabetic polyneuropathy associated with type 2 diabetes mellitus (H)     Hyperlipidemia associated with type 2 diabetes mellitus (H)     Hypertension associated with diabetes (H)     PAD (peripheral artery disease) (H)     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)     Research exam     Statin intolerance     Type 2 diabetes mellitus with stage 3 chronic kidney disease, without long-term current use of insulin (H)       Past Surgical History:   Procedure Laterality Date     APPENDECTOMY       EYE SURGERY Right     lens implant     INCISION AND DRAINAGE FOOT, COMBINED Right 7/15/2020    Procedure: Right foot incision and drainage with first ray amputation;  Surgeon: Erin Harris DPM;  Location: [facility] OR     JOINT REPLACEMENT Right      TONSILLECTOMY         No current facility-administered medications for this encounter.      Current Outpatient Medications   Medication     allopurinol (ZYLOPRIM) 100 MG tablet     aspirin (ASA) 81 MG chewable tablet     atenolol (TENORMIN) 25 MG tablet     calcium carbonate (OS-FADY 600 MG [place]. [place]) 600 MG tablet     cephALEXin (KEFLEX) 500 MG capsule     fluocinonide (LIDEX) 0.05 % external ointment     losartan (COZAAR) 25 MG tablet     Multiple Vitamin (MULTIVITAMINS PO)     naproxen sodium (ANAPROX) 220 MG tablet     pravastatin (PRAVACHOL) 10 MG tablet     tamsulosin (FLOMAX) 0.4 MG capsule     amoxicillin (AMOXIL) 500 MG capsule          Allergies   Allergen Reactions     Flu Virus Vaccine Anaphylaxis     Altace [Ramipril] Other (See Comments)     Dizziness       Ciprofloxacin Diarrhea     Hmg-Coa-R Inhibitors Muscle Pain (Myalgia)     Statin Intolerance Documentation  1. Unable to tolerate at least 2 statins: Crestor at the lowest starting daily dose and Zocor at any daily dose, due to reported symptoms which are temporally related to statin treatment  and reversible upon statin discontinuation and other causes have been excluded.     Lisinopril Other (See Comments)     Dizziness       Levaquin [Levofloxacin] Unknown     Sulfa Drugs Unknown     Celebrex [Celecoxib] Rash     Hydrocodone Rash     Niacin Itching       Family History   Problem Relation Age of Onset     Cancer No family hx of        Social History     Socioeconomic History     Marital status:      Spouse name: Not on file     Number of children: Not on file     Years of education: Not on file     Highest education level: Not on file   Occupational History     Not on file   Social Needs     Financial resource strain: Not on file     Food insecurity     Worry: Not on file     Inability: Not on file     Transportation needs     Medical: Not on file     Non-medical: Not on file   Tobacco Use     Smoking status: Former Smoker     Years: 50.00     Types: Cigarettes     Last attempt to quit: 2001     Years since quittin.5   Substance and Sexual Activity     Alcohol use: No     Drug use: Not on file     Sexual activity: Not on file   Lifestyle     Physical activity     Days per week: Not on file     Minutes per session: Not on file     Stress: Not on file   Relationships     Social connections     Talks on phone: Not on file     Gets together: Not on file     Attends Synagogue service: Not on file     Active member of club or organization: Not on file     Attends meetings of clubs or organizations: Not on file     Relationship status: Not on file     Intimate partner violence     Fear of current or ex partner: Not on file     Emotionally abused: Not on file     Physically abused: Not on file     Forced sexual activity: Not on file   Other Topics Concern     Parent/sibling w/ CABG, MI or angioplasty before 65F 55M? Not Asked   Social History Narrative     Not on file       ROS: 10 point ROS neg other than the symptoms noted above in the HPI.  EXAM  Constitutional: healthy, alert and no  distress    Psychiatric: mentation appears normal and affect normal/bright    VASCULAR:  -Dorsalis pedis pulse +2/4 b/l  -Posterior tibial pulse +1/4 b/l  -Capillary refill time < 3 seconds to b/l hallux  -Hair growth Absent to b/l anterior legs and ankles  NEURO:  -Light touch sensation intact to b/l plantar forefoot  DERM:  -Skin temperature, texture and turgor WNL b/l  -Toenails dystrophic, discolored x 10    -Skin along incision site of RIGHT medial and dorsal 1st ray is well approximated with no dehiscence.   ---Sutures intact.  ---Mild erythema (blancheable)  ---Mild cal-incision edema with no ecchymois--par with post-operative course.   ---No dark or ascending erythema, no moderate calor, no malodor, no purulence, no other SOI.  ---Resolved erythema and resolved moderate erythema compared to prior to surgery    MSK:  -No pain on palpation to RIGHT medial and dorsal incision site  -Partial first ray amputation of RIGHT foot    MRI RIGHT FOOT 07/02/2020  Soft Tissues: Persistent narrow plantar soft tissue defect overlying the medial 1st metatarsal head which extends up to the tibial sesamoid bone (series 11 image 18). Small peripherally enhancing fluid collection measuring 1.0 x 0.4 cm dorsomedial to the 1st metatarsal head (series 11 image 20) with an adjacent medial skin sinus tract.    IMPRESSION:  1.  Acute osteomyelitis of the 1st metatarsal head and tibial sesamoid. Small 1.0 cm abscess within the soft tissues dorsomedial to the 1st metatarsal head with an adjacent skin sinus tract.  2.  First metatarsophalangeal joint synovitis.  3.  Mild flexor hallucis longus tendinosis and tenosynovitis.  ============================================================    ASSESSMENT:  (M86.171) Acute osteomyelitis of right foot (H)  (primary encounter diagnosis)    Abscess of RIGHT foot    (E11.628,  L08.9) Diabetic foot infection (H)    (E11.9) Diabetes mellitus type 2, noninsulin dependent (H)    (E11.42) Diabetic  polyneuropathy associated with type 2 diabetes mellitus (H)      PLAN:  -Patient evaluated and examined. Treatment options discussed with no educational barriers noted.    Post op: Right foot incision and drainage with amputation of infected bone  DOS: 07/15/2020    -New dressing applied to foot: Adaptic, betadine soaked gauze, dry gauze, ABD, Kerlix, ACE  ---Dressing to be kept C/D/I until follow-up with podiatry  -Patient to resume his Keflex antibiotics upon leaving the hospital. Will refill prescription next week when patient presents to podiatry.  ---Reviewed cultures from surgery -- Keflex is an appropriate antibiotic of choice.  -Offloading: Discussed the importance of offloading with the patient. He may walk with his CAM boot, but he is to limit walking as much as possible and elevate his foot as much as possible to prevent dehiscence of the wound site and to help prevent further infection of the amputation site.  --Patient was instructed to look for SOI (redness, swelling, pain, purulence, fever, chills, nausea, vomiting) and to return to podiatry or the emergency department immediately if there are any SOI.     -Patient in agreement with the above treatment plan and all of patient's questions were answered.      [facility] podiatry outpatient clinic on Wednesday, 0722/2020        Erin Harris DPM

## 2020-07-17 NOTE — PROGRESS NOTES
Inpatient Physical Therapy Evaluation    Name: Michael Snider MRN# [id number]   Age: 84 year old YOB: 1935     Date of Consultation: 2020  Consultation is requested by:  Dr. Mac  Specific Consultation Request:  right partial ray ambutation, osteomyelitis   Primary care provider: Tristian Thornton    General Information:   Onset Date: 20    History of Current Problem/Admission: Osteomyelitis   Osteomyelitis (H)    Prior Level of Function: Pt was ambulating independently wearing walking boot prior to hospitalization   Ambulation: 1 - Assistive Equipment   Transferrin - Independent   Toiletin - Independent    Bathin - Independent   Dressin - Independent   Eatin - Independent   Communication: 0 - Understands/communicates without difficulty  Swallowin - swallows foods/liquids without difficulty  Cognition: 0 - no cognitive issues reported    Fall history within the last 6 months: No    Current Living Situation: Pt reports he lives at home with his wife who has dementia. Reports home is multi-level but he will stay on the main level. Patient has 1 stair to enter the home.    Current Equipment Used at Home: Has wheelchair, walker    Patient & Family Goals: Return home as soon as possible    Past Medical History:   Past Medical History:   Diagnosis Date     AAA (abdominal aortic aneurysm) (H)      Abdominal aortic aneurysm (AAA) without rupture (H) 2020    Added automatically from request for surgery [id number]     Benign non-nodular prostatic hyperplasia with lower urinary tract symptoms 2016     Chronic kidney disease (CKD), stage III (moderate) (H)      Diabetic polyneuropathy associated with type 2 diabetes mellitus (H) 2015     DM (diabetes mellitus) (H)      Gout      H/O endovascular stent graft for abdominal aortic aneurysm 8/10/2015     HTN (hypertension)      Hyperlipidemia      Hyperlipidemia associated with type 2 diabetes mellitus (H)  1/11/2016     Hypertension associated with diabetes (H) 1/11/2016     Neuropathy in diabetes (H)      Obesity      PAD (peripheral artery disease) (H) 1/11/2016     Shoulder injury     left     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) 12/2/2014 12/5/2014 CT of Chest, done for anterior CW pain on Right, disclosing RUL mass with hilar fullness c/w III-A disease. 12/8/2014 CT A/P: No additional disease.  12/15/2014 CT-guided biopsy of RUL mass. Path: High grade SQM of lung. Age at Dx: 79 12/29/2014 Initial eval by Med Onc ([facility]) 3/12/2015 Completion of chemorads (Dr. Hernández; started mid January) with weekly Taxol/Carbo (45 and AUC 2 respec     Type 2 diabetes mellitus with stage 3 chronic kidney disease, without long-term current use of insulin (H) 1/6/2017       Past Surgical History:  Past Surgical History:   Procedure Laterality Date     APPENDECTOMY       EYE SURGERY Right     lens implant     INCISION AND DRAINAGE FOOT, COMBINED Right 7/15/2020    Procedure: Right foot incision and drainage with first ray amputation;  Surgeon: Erin Harris DPM;  Location: [facility]     JOINT REPLACEMENT Right      TONSILLECTOMY         Medications:   Current Facility-Administered Medications   Medication     allopurinol (ZYLOPRIM) tablet 200 mg     aspirin (ASA) chewable tablet 81 mg     atenolol (TENORMIN) half-tab 12.5 mg     Contraindications to both pharmacological and mechanical prophylaxis (must document contraindications for both in this order)     glucose gel 15-30 g    Or     dextrose 50 % injection 25-50 mL    Or     glucagon injection 1 mg     insulin aspart (NovoLOG) injection (RAPID ACTING)     lidocaine (LMX4) kit     lidocaine 1 % 0.1-1 mL     losartan (COZAAR) tablet 25 mg     melatonin tablet 1 mg     naloxone (NARCAN) injection 0.1-0.4 mg     piperacillin-tazobactam (ZOSYN) infusion 3.375 g     polyethylene glycol (MIRALAX) Packet 17 g     polymyxin B 500,000 Units in sodium chloride 0.9% (bag) 3,000 mL  irrigation     PRE OP antibiotics NOT needed for this surgical procedure.     senna-docusate (SENOKOT-S/PERICOLACE) 8.6-50 MG per tablet 1 tablet    Or     senna-docusate (SENOKOT-S/PERICOLACE) 8.6-50 MG per tablet 2 tablet     sodium chloride (PF) 0.9% PF flush 3 mL     sodium chloride (PF) 0.9% PF flush 3 mL     sodium chloride 0.9% infusion     tamsulosin (FLOMAX) capsule 0.4 mg       Weight Bearing Status: Per Dr. Harris: Patient should wear a long leg CAM boot at all times while walking. Please minimize walking to short transfers with the boot with most pressure on the heel. He may work with physical therapy with short walks with the boot using an assistive device such as a walker. Patient has stairs at home and needs training for minimizing forefoot weight while walking and doing stairs.    Cognitive Status Examination:  Orientation: awake and alert  Level of Consciousness: alert  Follows Commands and Answers Questions: 100% of the time  Personal Safety and Judgement: Intact  Memory: Immediate recall intact  Comments:     Pain:   Currently in pain? No  Pain Location?   Pain Rating:     Physical Therapy Evaluation:   Integumentary/Edema: NA  ROM: NA  Strength: Functional LE strength demonstrated  Bed Mobility: Independent  Transfers: Independent with FWW  Gait: Mod I with FWW 50' wearing long leg cam boot RLE  Stairs: Ascended/descended 5 stairs with bilateral rails, step-to gait with minimal forefoot WB  Balance: Fair  Sensory: NA  Coordination: Normal     Physical Therapy Interventions: Evaluation + one treatment    Clinical Impressions:  Criteria for Skilled Therapeutic Intervention Met: Yes, treatment indicated  PT Diagnosis: Impaired gait   Influenced by the following impairments: Impaired gait, decreased WB RLE  Functional limitations due to impairment: Decreased tolerance for functional mobility   Clinical presentation: Stable/Uncomplicated  Clinical presentation rationale: Clinical judgement  Clinical  Decision making (complexity): Low Complexity  Frequency: Today  Predicted Duration of Therapy Intervention (days/wks): Today    Anticipated Discharge Disposition: Home  Anticipated Equipment Needs at Discharge: NA  Risks and Benefits of therapy have been explained: Yes  Patient, Family & other staff in agreement with plan of care: Yes  Clinical Impression Comments: PT ordered following R partial ray amputation. Pt tolerated session well today and performed all mobility mod I/SBA. Pt plans to discharge home today. Has a wheelchair and walker at home. Will complete PT order.     Total Eval Time: 15

## 2020-07-17 NOTE — PLAN OF CARE
No significant changes to pt status. Vital signs as charted. Denies pain. BG of 112 at dinner time w/ no need for sliding scale insulin. 117 HS. NS running at 50 mL/hr. IV Zosyn. Dressing to RLE remains CDI.     Face to face report given with opportunity to observe patient.    Report given to Justyna Alfonso   7/16/2020  11:15 PM

## 2020-07-18 LAB
BACTERIA SPEC CULT: ABNORMAL
BACTERIA SPEC CULT: ABNORMAL
SPECIMEN SOURCE: ABNORMAL
SPECIMEN SOURCE: ABNORMAL

## 2020-07-18 NOTE — PROGRESS NOTES
Name: Michael Snider    MRN#: [id number]    Reason for Hospitalization: Osteomyelitis   Osteomyelitis (H)    Discharge Date: 7/18/2020    Patient / Family response to discharge plan: in agreement    Follow-Up Appt:   Future Appointments   Date Time Provider Department Center   7/22/2020  3:45 PM Erin Harris DPM [facility]       Other Providers (Care Coordinator, County Services, PCA services etc): No    Discharge Disposition: home, transported by jadyn Mackenzie CM

## 2020-07-20 LAB — COPATH REPORT: NORMAL

## 2020-07-22 ENCOUNTER — OFFICE VISIT (OUTPATIENT)
Dept: PODIATRY | Facility: OTHER | Age: 85
End: 2020-07-22
Attending: PODIATRIST
Payer: MEDICARE

## 2020-07-22 VITALS
BODY MASS INDEX: 28.88 KG/M2 | HEIGHT: 69 IN | TEMPERATURE: 98.7 F | WEIGHT: 195 LBS | DIASTOLIC BLOOD PRESSURE: 78 MMHG | SYSTOLIC BLOOD PRESSURE: 159 MMHG

## 2020-07-22 DIAGNOSIS — L08.9 DIABETIC FOOT INFECTION (H): ICD-10-CM

## 2020-07-22 DIAGNOSIS — E11.628 DIABETIC FOOT INFECTION (H): ICD-10-CM

## 2020-07-22 DIAGNOSIS — E11.42 DIABETIC POLYNEUROPATHY ASSOCIATED WITH TYPE 2 DIABETES MELLITUS (H): ICD-10-CM

## 2020-07-22 DIAGNOSIS — E11.9 DIABETES MELLITUS TYPE 2, NONINSULIN DEPENDENT (H): ICD-10-CM

## 2020-07-22 DIAGNOSIS — M86.171 ACUTE OSTEOMYELITIS OF RIGHT FOOT (H): Primary | ICD-10-CM

## 2020-07-22 LAB
BACTERIA SPEC CULT: NORMAL
SPECIMEN SOURCE: NORMAL

## 2020-07-22 PROCEDURE — 99024 POSTOP FOLLOW-UP VISIT: CPT | Mod: 25 | Performed by: PODIATRIST

## 2020-07-22 PROCEDURE — G0463 HOSPITAL OUTPT CLINIC VISIT: HCPCS

## 2020-07-22 RX ORDER — CEPHALEXIN 500 MG/1
500 CAPSULE ORAL 3 TIMES DAILY
Qty: 24 CAPSULE | Refills: 0 | Status: SHIPPED | OUTPATIENT
Start: 2020-07-22 | End: 2020-07-30

## 2020-07-22 ASSESSMENT — PAIN SCALES - GENERAL: PAINLEVEL: NO PAIN (0)

## 2020-07-22 ASSESSMENT — MIFFLIN-ST. JEOR: SCORE: 1564.89

## 2020-07-22 NOTE — NURSING NOTE
"Chief Complaint   Patient presents with     Foot Problems     DM foot ulcer       Initial BP (!) 159/78 (BP Location: Left arm, Cuff Size: Adult Regular)   Temp 98.7  F (37.1  C) (Tympanic)   Ht 1.753 m (5' 9\")   Wt 88.5 kg (195 lb)   BMI 28.80 kg/m   Estimated body mass index is 28.8 kg/m  as calculated from the following:    Height as of this encounter: 1.753 m (5' 9\").    Weight as of this encounter: 88.5 kg (195 lb).  Medication Reconciliation: complete  Tanna Muniz LPN  "

## 2020-07-22 NOTE — PROGRESS NOTES
"Chief complaint: Patient presents with:  Foot Problems: DM foot ulcer      History of Present Illness: This 84 year old male is seen for a post-op following an I&D with a partial first ray amputation of the RIGHT foot on 07/15/2020.    Patient has left his dressing C/D/I. He has not been using an assistive walking device, but he says he has faithfully worn a CAM walker boot without removing it since he was discharged from the hospital on 07/17/2020. He has no pain in the foot. He is taking oral Keflex 500mg four times a day and he has only one day of antibiotics remaining.    No further pedal complaints today.       History of wound:  Patient says he has had the ulcer for six months. He says he originally had one ulcer, but then it became two ulcers. He has been on two rounds of Keflex in the past for the ulceration. He says he has been seeing Dr. Kiser for this wound. He has a Pressure Guardian Boot from [facility] in [place], [place]. He has been wearing it consistently, but his wound became infected before the skin healed over the infected ulcer.     Patient says he most recently saw Dr. Kiser on 07/09/2020. He reviewed an MRI of the RIGHT foot with the patient (MRI from 07/02/2020) and he was told his bone was infected. It was recommended he have a surgical I&D with IV antibiotics, but the patient refused stating this was not an option because his wife has dementia and she cannot be left alone for long periods of time. An ID consult was placed, but the patient says he was never contacted by ID. The patient was also under the impression that ID was recommended instead of an I&D.        Patient does not use tobacco products.     Last HbA1C was 6.0% on 01/13/2020.        BP (!) 159/78 (BP Location: Left arm, Cuff Size: Adult Regular)   Temp 98.7  F (37.1  C) (Tympanic)   Ht 1.753 m (5' 9\")   Wt 88.5 kg (195 lb)   BMI 28.80 kg/m      Patient Active Problem List   Diagnosis     Acute " osteomyelitis of right foot (H)     Diabetic foot infection (H)     Osteomyelitis (H)     Abdominal aortic aneurysm (AAA) without rupture (H)     Benign non-nodular prostatic hyperplasia with lower urinary tract symptoms     Diabetic polyneuropathy associated with type 2 diabetes mellitus (H)     Hyperlipidemia associated with type 2 diabetes mellitus (H)     Hypertension associated with diabetes (H)     PAD (peripheral artery disease) (H)     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)     Research exam     Statin intolerance     Type 2 diabetes mellitus with stage 3 chronic kidney disease, without long-term current use of insulin (H)       Past Surgical History:   Procedure Laterality Date     APPENDECTOMY       EYE SURGERY Right     lens implant     INCISION AND DRAINAGE FOOT, COMBINED Right 7/15/2020    Procedure: Right foot incision and drainage with first ray amputation;  Surgeon: Erin Harris DPM;  Location: [facility]     JOINT REPLACEMENT Right      TONSILLECTOMY         Current Outpatient Medications   Medication     allopurinol (ZYLOPRIM) 100 MG tablet     amoxicillin (AMOXIL) 500 MG capsule     aspirin (ASA) 81 MG chewable tablet     atenolol (TENORMIN) 25 MG tablet     calcium carbonate (OS-FADY 600 MG [place]) 600 MG tablet     losartan (COZAAR) 25 MG tablet     Multiple Vitamin (MULTIVITAMINS PO)     naproxen sodium (ANAPROX) 220 MG tablet     pravastatin (PRAVACHOL) 10 MG tablet     tamsulosin (FLOMAX) 0.4 MG capsule     fluocinonide (LIDEX) 0.05 % external ointment     No current facility-administered medications for this visit.           Allergies   Allergen Reactions     Flu Virus Vaccine Anaphylaxis     Altace [Ramipril] Other (See Comments)     Dizziness       Ciprofloxacin Diarrhea     Hmg-Coa-R Inhibitors Muscle Pain (Myalgia)     Statin Intolerance Documentation  1. Unable to tolerate at least 2 statins: Crestor at the lowest starting daily dose and Zocor at any daily dose, due to reported  symptoms which are temporally related to statin treatment and reversible upon statin discontinuation and other causes have been excluded.     Lisinopril Other (See Comments)     Dizziness       Levaquin [Levofloxacin] Unknown     Sulfa Drugs Unknown     Celebrex [Celecoxib] Rash     Hydrocodone Rash     Niacin Itching       Family History   Problem Relation Age of Onset     Cancer No family hx of        Social History     Socioeconomic History     Marital status:      Spouse name: Not on file     Number of children: Not on file     Years of education: Not on file     Highest education level: Not on file   Occupational History     Not on file   Social Needs     Financial resource strain: Not on file     Food insecurity     Worry: Not on file     Inability: Not on file     Transportation needs     Medical: Not on file     Non-medical: Not on file   Tobacco Use     Smoking status: Former Smoker     Years: 50.00     Types: Cigarettes     Last attempt to quit: 2001     Years since quittin.5   Substance and Sexual Activity     Alcohol use: No     Drug use: Not on file     Sexual activity: Not on file   Lifestyle     Physical activity     Days per week: Not on file     Minutes per session: Not on file     Stress: Not on file   Relationships     Social connections     Talks on phone: Not on file     Gets together: Not on file     Attends Sikhism service: Not on file     Active member of club or organization: Not on file     Attends meetings of clubs or organizations: Not on file     Relationship status: Not on file     Intimate partner violence     Fear of current or ex partner: Not on file     Emotionally abused: Not on file     Physically abused: Not on file     Forced sexual activity: Not on file   Other Topics Concern     Parent/sibling w/ CABG, MI or angioplasty before 65F 55M? Not Asked   Social History Narrative     Not on file       ROS: 10 point ROS neg other than the symptoms noted above in the  HPI.  EXAM  Constitutional: healthy, alert and no distress    Psychiatric: mentation appears normal and affect normal/bright    VASCULAR:  -Dorsalis pedis pulse +2/4 b/l  -Posterior tibial pulse +1/4 b/l  -Capillary refill time < 3 seconds to b/l hallux  -Hair growth Absent to b/l anterior legs and ankles  NEURO:  -Light touch sensation intact to b/l plantar forefoot  DERM:  -Skin temperature, texture and turgor WNL b/l  -Toenails elongated, thickened, dystrophic and discolored x 10    RIGHT partial first ray amputation (DOS 07/15/2020)  -Skin along incision sites is well approximated to the proximal half of the incision  ---Early dehiscence with skin still approximated to the distal half of the incision    ---Sutures intact.  ---Mild erythema (blancheable) to cal-incision site -- par with post-operative course  ---Mild cal-incision edema and minimal-to-no ecchymois--par with post-operative course.   ---No dark or ascending erythema, no moderate calor, no malodor, no purulence, no other SOI.    MSK:  -No pain on palpation to RIGHT foot partial first ray amputation site  -Muscle strength of ankles +5/5 for dorsiflexion, plantarflexion, ABDUction and ADDuction b/l    MRI RIGHT FOOT 07/02/2020  Soft Tissues: Persistent narrow plantar soft tissue defect overlying the medial 1st metatarsal head which extends up to the tibial sesamoid bone (series 11 image 18). Small peripherally enhancing fluid collection measuring 1.0 x 0.4 cm dorsomedial to the 1st metatarsal head (series 11 image 20) with an adjacent medial skin sinus tract.    IMPRESSION:  1.  Acute osteomyelitis of the 1st metatarsal head and tibial sesamoid. Small 1.0 cm abscess within the soft tissues dorsomedial to the 1st metatarsal head with an adjacent skin sinus tract.  2.  First metatarsophalangeal joint synovitis.  3.  Mild flexor hallucis longus tendinosis and tenosynovitis.    SURGICAL PATHOLOGY FROM RIGHT 1ST PARTIAL FIRST RAY AMPUTATION  "07/15/2020  FINAL DIAGNOSIS:   Right foot, first ray, incision and drainage with amputation   - Acute osteomyelitis   - Early chronic osteomyelitis     ============================================================    ASSESSMENT:  (M86.171) Acute osteomyelitis of right foot (H)  (primary encounter diagnosis)    (E11.628,  L08.9) Diabetic foot infection (H)    (E11.9) Diabetes mellitus type 2, noninsulin dependent (H)    (E11.42) Diabetic polyneuropathy associated with type 2 diabetes mellitus (H)      PLAN:  -Patient evaluated and examined. Treatment options discussed with no educational barriers noted.  -Post-op: Right foot incision and drainage with amputation of infected bone  -DOS: 07/15/2020  -Reviewed pathology results with patient    -Keflex 500mg three times daily prescribed for an additional eight days    -Dressed wound with oil emulsion gauze, betadine soaked gauze, dry gauze, ABD pad, Kerlix and double long 4\" ACE wrap.  ---No current active infection noted  ---Keep dressing C/D/I    -Patient in agreement with the above treatment plan and all of patient's questions were answered.      RTC one week with wound care for dressing change  RTC two weeks in [facility] with podiatry      Erin Harris DPM  "

## 2020-07-23 ENCOUNTER — TELEPHONE (OUTPATIENT)
Dept: PODIATRY | Facility: OTHER | Age: 85
End: 2020-07-23

## 2020-07-23 NOTE — TELEPHONE ENCOUNTER
Returned Kathleen's ( pt dtr ) call, informed her that the antibiotic script was sent to [facility] in [facility]. She stated she will call there and get it changed to [facility] cause they cost to much at [facility].

## 2020-07-23 NOTE — TELEPHONE ENCOUNTER
Kathleen /daughter called stating RX for antibotics was suppose to be called into [facility] in [facility]. Patient went there and there wasn't anything.     Please call Kathleen [phone]

## 2020-07-29 ENCOUNTER — HOSPITAL ENCOUNTER (OUTPATIENT)
Dept: WOUND CARE | Facility: HOSPITAL | Age: 85
Discharge: HOME | End: 2020-07-29
Attending: FAMILY MEDICINE | Admitting: PODIATRIST
Payer: MEDICARE

## 2020-07-29 VITALS
OXYGEN SATURATION: 93 % | SYSTOLIC BLOOD PRESSURE: 132 MMHG | HEART RATE: 63 BPM | TEMPERATURE: 97.1 F | DIASTOLIC BLOOD PRESSURE: 70 MMHG

## 2020-07-29 DIAGNOSIS — T81.30XA WOUND DEHISCENCE: Primary | ICD-10-CM

## 2020-07-29 DIAGNOSIS — E11.628 DIABETIC FOOT INFECTION (H): ICD-10-CM

## 2020-07-29 DIAGNOSIS — L08.9 DIABETIC FOOT INFECTION (H): ICD-10-CM

## 2020-07-29 DIAGNOSIS — M86.171 ACUTE OSTEOMYELITIS OF RIGHT FOOT (H): ICD-10-CM

## 2020-07-29 PROCEDURE — G0463 HOSPITAL OUTPT CLINIC VISIT: HCPCS

## 2020-07-29 NOTE — PROGRESS NOTES
"Chief Complaint   Patient presents with     WOUND CARE       Initial /70 (BP Location: Left arm)   Pulse 63   Temp 97.1  F (36.2  C) (Tympanic)   SpO2 93%  Estimated body mass index is 28.8 kg/m  as calculated from the following:    Height as of 7/22/20: 1.753 m (5' 9\").    Weight as of 7/22/20: 88.5 kg (195 lb).  Medication Reconciliation: complete  Debora Diaz  "

## 2020-07-29 NOTE — PROGRESS NOTES
Michael Snider, 1935  Chief Complaint   Patient presents with     WOUND CARE       Patient Active Problem List   Diagnosis     Acute osteomyelitis of right foot (H)     Diabetic foot infection (H)     Osteomyelitis (H)     Abdominal aortic aneurysm (AAA) without rupture (H)     Benign non-nodular prostatic hyperplasia with lower urinary tract symptoms     Diabetic polyneuropathy associated with type 2 diabetes mellitus (H)     Hyperlipidemia associated with type 2 diabetes mellitus (H)     Hypertension associated with diabetes (H)     PAD (peripheral artery disease) (H)     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)     Research exam     Statin intolerance     Type 2 diabetes mellitus with stage 3 chronic kidney disease, without long-term current use of insulin (H)       Concerns/Comments:   Michael Snider is here for re-evaluation and tx of R 1st ray amputation site.    Per documentation the pt believes the initial wounds to the foot started around the beginning of 2020. Dr. Harris performed the partial 1st ray amputation on 7/15/20.    Has been wearing his CAM boot faithfully. Explains he did some gardening but covered over the CAM boot to keep it clean.     Objective:   Location:   1st ray amputation site (superficial surface level dehiscence)   Drainage:   scant    Odor:   none  Measurement:   4.0x0.5cm  Edges:   attached  Base:   90%pink/10% marbled fibrin (? Subcutaneous)  Surrounding Skin:   Sutures approximated to rest of incision, no periwound erythema or excessive warmth; no odor    Procedures:   Wound bed cleansed and evaluated.  Dressed per plan of care.    Assessment/Response to tx:  No s/s of infection.  Appears to be healing.  Swelling well controlled with ACE dressing.    Plan of care:   Weekly dressing change in the Wound Center or with Dr. Harris  Cleanse wound.  Dress with oil emulsion gauze, covered with betadine moist gauze, dry gauze, ABD and kerlix.   Double ACE wrap extending from  foot to bend of knee.  CAM boot.    Treatment Goal:  Epithelialization without further dehiscence or infection.    Supplies provided:  NA    Education:  Wound care instructions/education provided. Patient verbalized understanding of instruction/education.    CC: Tristian Thornton

## 2020-08-05 ENCOUNTER — OFFICE VISIT (OUTPATIENT)
Dept: PODIATRY | Facility: OTHER | Age: 85
End: 2020-08-05
Attending: PODIATRIST
Payer: MEDICARE

## 2020-08-05 VITALS
TEMPERATURE: 98.1 F | SYSTOLIC BLOOD PRESSURE: 124 MMHG | OXYGEN SATURATION: 94 % | DIASTOLIC BLOOD PRESSURE: 64 MMHG | HEART RATE: 66 BPM

## 2020-08-05 DIAGNOSIS — M86.171 ACUTE OSTEOMYELITIS OF RIGHT FOOT (H): Primary | ICD-10-CM

## 2020-08-05 DIAGNOSIS — E11.42 DIABETIC POLYNEUROPATHY ASSOCIATED WITH TYPE 2 DIABETES MELLITUS (H): ICD-10-CM

## 2020-08-05 DIAGNOSIS — E11.628 DIABETIC FOOT INFECTION (H): ICD-10-CM

## 2020-08-05 DIAGNOSIS — L08.9 DIABETIC FOOT INFECTION (H): ICD-10-CM

## 2020-08-05 DIAGNOSIS — E11.9 DIABETES MELLITUS TYPE 2, NONINSULIN DEPENDENT (H): ICD-10-CM

## 2020-08-05 PROCEDURE — G0463 HOSPITAL OUTPT CLINIC VISIT: HCPCS

## 2020-08-05 PROCEDURE — 99024 POSTOP FOLLOW-UP VISIT: CPT | Performed by: PODIATRIST

## 2020-08-05 ASSESSMENT — PAIN SCALES - GENERAL: PAINLEVEL: NO PAIN (0)

## 2020-08-05 NOTE — NURSING NOTE
"Chief Complaint   Patient presents with     Surgical Followup       Initial /64 (BP Location: Right arm, Patient Position: Sitting, Cuff Size: Adult Regular)   Pulse 66   Temp 98.1  F (36.7  C) (Tympanic)   SpO2 94%  Estimated body mass index is 28.8 kg/m  as calculated from the following:    Height as of 7/22/20: 1.753 m (5' 9\").    Weight as of 7/22/20: 88.5 kg (195 lb).  Medication Reconciliation: tyshawn Diaz  "

## 2020-08-05 NOTE — PROGRESS NOTES
Chief complaint: Patient presents with:  Surgical Followup      History of Present Illness: This 84 year old male is seen for a post-op following an I&D with a partial first ray amputation of the RIGHT foot on 07/15/2020.    Patient saw wound care last week and he has left his dressing C/D/I. He has walked at all times in a CAM boot and he is wondering when he can start wearing a regular shoe. He has no pain from the amputation site. He is no longer taking antibiotics.      No further pedal complaints today.       Patient does not use tobacco products.     Last HbA1C was 6.0% on 01/13/2020.        /64 (BP Location: Right arm, Patient Position: Sitting, Cuff Size: Adult Regular)   Pulse 66   Temp 98.1  F (36.7  C) (Tympanic)   SpO2 94%     Patient Active Problem List   Diagnosis     Acute osteomyelitis of right foot (H)     Diabetic foot infection (H)     Osteomyelitis (H)     Abdominal aortic aneurysm (AAA) without rupture (H)     Benign non-nodular prostatic hyperplasia with lower urinary tract symptoms     Diabetic polyneuropathy associated with type 2 diabetes mellitus (H)     Hyperlipidemia associated with type 2 diabetes mellitus (H)     Hypertension associated with diabetes (H)     PAD (peripheral artery disease) (H)     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)     Research exam     Statin intolerance     Type 2 diabetes mellitus with stage 3 chronic kidney disease, without long-term current use of insulin (H)       Past Surgical History:   Procedure Laterality Date     APPENDECTOMY       EYE SURGERY Right     lens implant     INCISION AND DRAINAGE FOOT, COMBINED Right 7/15/2020    Procedure: Right foot incision and drainage with first ray amputation;  Surgeon: Erin Harris DPM;  Location: [facility]     JOINT REPLACEMENT Right      TONSILLECTOMY         Current Outpatient Medications   Medication     allopurinol (ZYLOPRIM) 100 MG tablet     amoxicillin (AMOXIL) 500 MG capsule     aspirin (ASA) 81  MG chewable tablet     atenolol (TENORMIN) 25 MG tablet     calcium carbonate (OS-FADY 600 MG [place]. [place]) 600 MG tablet     fluocinonide (LIDEX) 0.05 % external ointment     losartan (COZAAR) 25 MG tablet     Multiple Vitamin (MULTIVITAMINS PO)     naproxen sodium (ANAPROX) 220 MG tablet     pravastatin (PRAVACHOL) 10 MG tablet     tamsulosin (FLOMAX) 0.4 MG capsule     No current facility-administered medications for this visit.           Allergies   Allergen Reactions     Flu Virus Vaccine Anaphylaxis     Altace [Ramipril] Other (See Comments)     Dizziness       Ciprofloxacin Diarrhea     Hmg-Coa-R Inhibitors Muscle Pain (Myalgia)     Statin Intolerance Documentation  1. Unable to tolerate at least 2 statins: Crestor at the lowest starting daily dose and Zocor at any daily dose, due to reported symptoms which are temporally related to statin treatment and reversible upon statin discontinuation and other causes have been excluded.     Lisinopril Other (See Comments)     Dizziness       Levaquin [Levofloxacin] Unknown     Sulfa Drugs Unknown     Celebrex [Celecoxib] Rash     Hydrocodone Rash     Niacin Itching       Family History   Problem Relation Age of Onset     Cancer No family hx of        Social History     Socioeconomic History     Marital status:      Spouse name: Not on file     Number of children: Not on file     Years of education: Not on file     Highest education level: Not on file   Occupational History     Not on file   Social Needs     Financial resource strain: Not on file     Food insecurity     Worry: Not on file     Inability: Not on file     Transportation needs     Medical: Not on file     Non-medical: Not on file   Tobacco Use     Smoking status: Former Smoker     Years: 50.00     Types: Cigarettes     Last attempt to quit: 2001     Years since quittin.5   Substance and Sexual Activity     Alcohol use: No     Drug use: Not on file     Sexual activity: Not on file   Lifestyle      Physical activity     Days per week: Not on file     Minutes per session: Not on file     Stress: Not on file   Relationships     Social connections     Talks on phone: Not on file     Gets together: Not on file     Attends Taoist service: Not on file     Active member of club or organization: Not on file     Attends meetings of clubs or organizations: Not on file     Relationship status: Not on file     Intimate partner violence     Fear of current or ex partner: Not on file     Emotionally abused: Not on file     Physically abused: Not on file     Forced sexual activity: Not on file   Other Topics Concern     Parent/sibling w/ CABG, MI or angioplasty before 65F 55M? Not Asked   Social History Narrative     Not on file       ROS: 10 point ROS neg other than the symptoms noted above in the HPI.  EXAM  Constitutional: healthy, alert and no distress    Psychiatric: mentation appears normal and affect normal/bright    VASCULAR:  -Dorsalis pedis pulse +2/4 b/l  -Posterior tibial pulse +1/4 b/l  -Capillary refill time < 3 seconds to b/l hallux  -Hair growth Absent to b/l anterior legs and ankles  NEURO:  -Light touch sensation intact to b/l plantar forefoot  DERM:  -Skin temperature, texture and turgor WNL b/l  -Toenails elongated, thickened, dystrophic and discolored x 10    RIGHT partial first ray amputation (DOS 07/15/2020)  -Skin along incision sites is well approximated to entire incision with no dehiscence including post removal of sutures  ---Minimal-to-no erythema (blancheable) to cal-incision site -- par with post-operative course  ---Minimal cal-incision edema and no ecchymois--par with post-operative course.   ---No dark or ascending erythema, no moderate calor, no malodor, no purulence, no other SOI.    MSK:  -No pain on palpation to RIGHT foot partial first ray amputation site  -Muscle strength of ankles +5/5 for dorsiflexion, plantarflexion, ABDUction and ADDuction b/l    MRI RIGHT FOOT  "07/02/2020  Soft Tissues: Persistent narrow plantar soft tissue defect overlying the medial 1st metatarsal head which extends up to the tibial sesamoid bone (series 11 image 18). Small peripherally enhancing fluid collection measuring 1.0 x 0.4 cm dorsomedial to the 1st metatarsal head (series 11 image 20) with an adjacent medial skin sinus tract.    IMPRESSION:  1.  Acute osteomyelitis of the 1st metatarsal head and tibial sesamoid. Small 1.0 cm abscess within the soft tissues dorsomedial to the 1st metatarsal head with an adjacent skin sinus tract.  2.  First metatarsophalangeal joint synovitis.  3.  Mild flexor hallucis longus tendinosis and tenosynovitis.    SURGICAL PATHOLOGY FROM RIGHT 1ST PARTIAL FIRST RAY AMPUTATION 07/15/2020  FINAL DIAGNOSIS:   Right foot, first ray, incision and drainage with amputation   - Acute osteomyelitis   - Early chronic osteomyelitis     ============================================================    ASSESSMENT:  (M86.171) Acute osteomyelitis of right foot (H)  (primary encounter diagnosis)    (E11.628,  L08.9) Diabetic foot infection (H)    (E11.9) Diabetes mellitus type 2, noninsulin dependent (H)    (E11.42) Diabetic polyneuropathy associated with type 2 diabetes mellitus (H)      PLAN:  -Patient evaluated and examined. Treatment options discussed with no educational barriers noted.  -Post-op: Right foot incision and drainage with amputation of infected bone  -DOS: 07/15/2020  -Reviewed pathology results with patient    -Keflex 500mg three times daily no longer needs to be taken    -Removed sutures   ---Applied tincture to the cal-incision site(s)  ---Applied steri-strips over the incision site(s)  ---Applied dry gauze and tape over incision site and double long 4\" ACE around foot and leg  ---Patient instructed to continue to monitor the incision sites for SOI (redness, swelling, pain, purulence, fever, chills, nausea, vomiting) and return to podiatry or the Emergency " Department immediately if there are any noticeable SOI.  -Offloading: Continue CAM boot for one more week  ---Will consider resuming shoes at follow-up appointment in one week  ---Sent message to the orthotist, Jo Ann Carranza, on 08/05/2020 to see if she can evaluate patient within a few weeks for DM shoes and inserts    -Patient in agreement with the above treatment plan and all of patient's questions were answered.      RTC one week -- will consider transitioning to regular shoes at follow-up appointment  ---If still healed, will obtain final post-op x-rays after next follow-up appointment      Erin Harris DPM

## 2020-08-12 ENCOUNTER — OFFICE VISIT (OUTPATIENT)
Dept: PODIATRY | Facility: OTHER | Age: 85
End: 2020-08-12
Attending: PODIATRIST
Payer: MEDICARE

## 2020-08-12 VITALS
OXYGEN SATURATION: 96 % | DIASTOLIC BLOOD PRESSURE: 66 MMHG | SYSTOLIC BLOOD PRESSURE: 145 MMHG | TEMPERATURE: 96.9 F | HEART RATE: 53 BPM

## 2020-08-12 DIAGNOSIS — E11.42 DIABETIC POLYNEUROPATHY ASSOCIATED WITH TYPE 2 DIABETES MELLITUS (H): ICD-10-CM

## 2020-08-12 DIAGNOSIS — M86.171 ACUTE OSTEOMYELITIS OF RIGHT FOOT (H): Primary | ICD-10-CM

## 2020-08-12 DIAGNOSIS — E11.628 DIABETIC FOOT INFECTION (H): ICD-10-CM

## 2020-08-12 DIAGNOSIS — E11.9 DIABETES MELLITUS TYPE 2, NONINSULIN DEPENDENT (H): ICD-10-CM

## 2020-08-12 DIAGNOSIS — L08.9 DIABETIC FOOT INFECTION (H): ICD-10-CM

## 2020-08-12 PROCEDURE — G0463 HOSPITAL OUTPT CLINIC VISIT: HCPCS

## 2020-08-12 PROCEDURE — 99024 POSTOP FOLLOW-UP VISIT: CPT | Performed by: PODIATRIST

## 2020-08-12 ASSESSMENT — PAIN SCALES - GENERAL: PAINLEVEL: NO PAIN (0)

## 2020-08-12 NOTE — PROGRESS NOTES
Chief complaint: Patient presents with:  Surgical Followup      History of Present Illness: This 84 year old male is seen for a post-op following an I&D with a partial first ray amputation of the RIGHT foot on 07/15/2020.    He has kept his foot dry and he has no pain from the amputation site.  He still has his steri-strips on the foot today. He is wearing the CAM boot at all times and he is not taking antibiotics.      No further pedal complaints today.       Patient does not use tobacco products.     Last HbA1C was 6.0% on 01/13/2020.        BP (!) 160/70 (BP Location: Left arm, Patient Position: Sitting, Cuff Size: Adult Regular)   Pulse 53   Temp 96.9  F (36.1  C) (Tympanic)   SpO2 96%     Patient Active Problem List   Diagnosis     Acute osteomyelitis of right foot (H)     Diabetic foot infection (H)     Osteomyelitis (H)     Abdominal aortic aneurysm (AAA) without rupture (H)     Benign non-nodular prostatic hyperplasia with lower urinary tract symptoms     Diabetic polyneuropathy associated with type 2 diabetes mellitus (H)     Hyperlipidemia associated with type 2 diabetes mellitus (H)     Hypertension associated with diabetes (H)     PAD (peripheral artery disease) (H)     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)     Research exam     Statin intolerance     Type 2 diabetes mellitus with stage 3 chronic kidney disease, without long-term current use of insulin (H)       Past Surgical History:   Procedure Laterality Date     APPENDECTOMY       EYE SURGERY Right     lens implant     INCISION AND DRAINAGE FOOT, COMBINED Right 7/15/2020    Procedure: Right foot incision and drainage with first ray amputation;  Surgeon: Erin Harris DPM;  Location: [facility]     JOINT REPLACEMENT Right      TONSILLECTOMY         Current Outpatient Medications   Medication     allopurinol (ZYLOPRIM) 100 MG tablet     amoxicillin (AMOXIL) 500 MG capsule     aspirin (ASA) 81 MG chewable tablet     atenolol (TENORMIN) 25 MG  tablet     calcium carbonate (OS-FADY 600 MG [place]. [place]) 600 MG tablet     fluocinonide (LIDEX) 0.05 % external ointment     losartan (COZAAR) 25 MG tablet     Multiple Vitamin (MULTIVITAMINS PO)     naproxen sodium (ANAPROX) 220 MG tablet     pravastatin (PRAVACHOL) 10 MG tablet     tamsulosin (FLOMAX) 0.4 MG capsule     No current facility-administered medications for this visit.           Allergies   Allergen Reactions     Flu Virus Vaccine Anaphylaxis     Altace [Ramipril] Other (See Comments)     Dizziness       Ciprofloxacin Diarrhea     Hmg-Coa-R Inhibitors Muscle Pain (Myalgia)     Statin Intolerance Documentation  1. Unable to tolerate at least 2 statins: Crestor at the lowest starting daily dose and Zocor at any daily dose, due to reported symptoms which are temporally related to statin treatment and reversible upon statin discontinuation and other causes have been excluded.     Lisinopril Other (See Comments)     Dizziness       Levaquin [Levofloxacin] Unknown     Sulfa Drugs Unknown     Celebrex [Celecoxib] Rash     Hydrocodone Rash     Niacin Itching       Family History   Problem Relation Age of Onset     Cancer No family hx of        Social History     Socioeconomic History     Marital status:      Spouse name: Not on file     Number of children: Not on file     Years of education: Not on file     Highest education level: Not on file   Occupational History     Not on file   Social Needs     Financial resource strain: Not on file     Food insecurity     Worry: Not on file     Inability: Not on file     Transportation needs     Medical: Not on file     Non-medical: Not on file   Tobacco Use     Smoking status: Former Smoker     Years: 50.00     Types: Cigarettes     Last attempt to quit: 2001     Years since quittin.5   Substance and Sexual Activity     Alcohol use: No     Drug use: Not on file     Sexual activity: Not on file   Lifestyle     Physical activity     Days per week: Not on file      Minutes per session: Not on file     Stress: Not on file   Relationships     Social connections     Talks on phone: Not on file     Gets together: Not on file     Attends Confucianist service: Not on file     Active member of club or organization: Not on file     Attends meetings of clubs or organizations: Not on file     Relationship status: Not on file     Intimate partner violence     Fear of current or ex partner: Not on file     Emotionally abused: Not on file     Physically abused: Not on file     Forced sexual activity: Not on file   Other Topics Concern     Parent/sibling w/ CABG, MI or angioplasty before 65F 55M? Not Asked   Social History Narrative     Not on file       ROS: 10 point ROS neg other than the symptoms noted above in the HPI.  EXAM  Constitutional: healthy, alert and no distress    Psychiatric: mentation appears normal and affect normal/bright    VASCULAR:  -Dorsalis pedis pulse +2/4 b/l  -Posterior tibial pulse +1/4 b/l  -Capillary refill time < 3 seconds to b/l hallux  -Hair growth Absent to b/l anterior legs and ankles  NEURO:  -Light touch sensation intact to b/l plantar forefoot  DERM:  -Skin temperature, texture and turgor WNL b/l  -Toenails elongated, thickened, dystrophic and discolored x 10    RIGHT partial first ray amputation (DOS 07/15/2020)  -Skin along incision sites is well approximated with no dehiscence  ---No erythema (blancheable) to cal-incision site -- par with post-operative course  ---Minimal cal-incision edema and no ecchymois--par with post-operative course.   ---No dark or ascending erythema, no moderate calor, no malodor, no purulence, no other SOI.    MSK:  -No pain on palpation to RIGHT foot partial first ray amputation site  -Muscle strength of ankles +5/5 for dorsiflexion, plantarflexion, ABDUction and ADDuction b/l    MRI RIGHT FOOT 07/02/2020  Soft Tissues: Persistent narrow plantar soft tissue defect overlying the medial 1st metatarsal head which extends up  "to the tibial sesamoid bone (series 11 image 18). Small peripherally enhancing fluid collection measuring 1.0 x 0.4 cm dorsomedial to the 1st metatarsal head (series 11 image 20) with an adjacent medial skin sinus tract.    IMPRESSION:  1.  Acute osteomyelitis of the 1st metatarsal head and tibial sesamoid. Small 1.0 cm abscess within the soft tissues dorsomedial to the 1st metatarsal head with an adjacent skin sinus tract.  2.  First metatarsophalangeal joint synovitis.  3.  Mild flexor hallucis longus tendinosis and tenosynovitis.    SURGICAL PATHOLOGY FROM RIGHT 1ST PARTIAL FIRST RAY AMPUTATION 07/15/2020  FINAL DIAGNOSIS:   Right foot, first ray, incision and drainage with amputation   - Acute osteomyelitis   - Early chronic osteomyelitis     ============================================================    ASSESSMENT:  (M86.171) Acute osteomyelitis of right foot (H)  (primary encounter diagnosis)    (E11.628,  L08.9) Diabetic foot infection (H)    (E11.9) Diabetes mellitus type 2, noninsulin dependent (H)    (E11.42) Diabetic polyneuropathy associated with type 2 diabetes mellitus (H)      PLAN:  -Patient evaluated and examined. Treatment options discussed with no educational barriers noted.  -Post-op: Right foot incision and drainage with amputation of infected bone  -DOS: 07/15/2020  -Reviewed pathology results with patient    -Keflex 500mg three times daily no longer needs to be taken    Offloading:  ---Resume regular shoe gear  ---Slowly start gardening with 30 minutes at a time and elevate foot in between sessions  ---Resume 4\" double long ACE wrap if swelling worsens    Dressing:  -Apply dry gauze dressing daily to protect scab    ---Sent another message to the orthotist, Jo Ann Carranza, on 08/12/2020 (previously on 08/05/2020) to see if she can evaluate patient within a few weeks for DM shoes and inserts    -Patient in agreement with the above treatment plan and all of patient's questions were " answered.      RTC one week in [facility] with final post-op radiographs in [facility] (will order at that time)        Erin Harris DPM

## 2020-08-12 NOTE — NURSING NOTE
"Chief Complaint   Patient presents with     Surgical Followup       Initial BP (!) 160/70 (BP Location: Left arm, Patient Position: Sitting, Cuff Size: Adult Regular)   Pulse 53   Temp 96.9  F (36.1  C) (Tympanic)   SpO2 96%  Estimated body mass index is 28.8 kg/m  as calculated from the following:    Height as of 7/22/20: 1.753 m (5' 9\").    Weight as of 7/22/20: 88.5 kg (195 lb).  Medication Reconciliation: tyshawn Diaz  "

## 2020-08-12 NOTE — PATIENT INSTRUCTIONS
-You may get your foot wet in the shower  -You may walk in a regular shoe  -Apply a piece of gauze (tape on the foot) daily to protect the scabs.  -You may slowly start gardening (start with 30 minutes at a time and elevate your foot in between)    -Call if you feel off balance or if your wound re-opens  -Look for SOI (redness, swelling, pain, purulence, fever, chills, nausea, vomiting) and to return to podiatry or the emergency department immediately if there are any signs of infection.

## 2020-08-13 LAB
FUNGUS SPEC CULT: NORMAL
FUNGUS SPEC CULT: NORMAL
Lab: NORMAL
SPECIMEN SOURCE: NORMAL
SPECIMEN SOURCE: NORMAL

## 2020-08-19 ENCOUNTER — OFFICE VISIT (OUTPATIENT)
Dept: PODIATRY | Facility: OTHER | Age: 85
End: 2020-08-19
Attending: PODIATRIST
Payer: MEDICARE

## 2020-08-19 ENCOUNTER — ANCILLARY PROCEDURE (OUTPATIENT)
Dept: GENERAL RADIOLOGY | Facility: OTHER | Age: 85
End: 2020-08-19
Attending: PODIATRIST
Payer: MEDICARE

## 2020-08-19 VITALS
HEART RATE: 60 BPM | TEMPERATURE: 96.7 F | BODY MASS INDEX: 28.8 KG/M2 | SYSTOLIC BLOOD PRESSURE: 138 MMHG | DIASTOLIC BLOOD PRESSURE: 70 MMHG | WEIGHT: 195 LBS | OXYGEN SATURATION: 95 %

## 2020-08-19 DIAGNOSIS — L08.9 DIABETIC FOOT INFECTION (H): ICD-10-CM

## 2020-08-19 DIAGNOSIS — L60.3 ONYCHODYSTROPHY: ICD-10-CM

## 2020-08-19 DIAGNOSIS — E11.42 DIABETIC POLYNEUROPATHY ASSOCIATED WITH TYPE 2 DIABETES MELLITUS (H): ICD-10-CM

## 2020-08-19 DIAGNOSIS — M86.171 ACUTE OSTEOMYELITIS OF RIGHT FOOT (H): ICD-10-CM

## 2020-08-19 DIAGNOSIS — E11.9 DIABETES MELLITUS TYPE 2, NONINSULIN DEPENDENT (H): ICD-10-CM

## 2020-08-19 DIAGNOSIS — E11.628 DIABETIC FOOT INFECTION (H): ICD-10-CM

## 2020-08-19 DIAGNOSIS — M86.171 ACUTE OSTEOMYELITIS OF RIGHT FOOT (H): Primary | ICD-10-CM

## 2020-08-19 PROCEDURE — 73630 X-RAY EXAM OF FOOT: CPT | Mod: TC,RT,FY

## 2020-08-19 PROCEDURE — 99024 POSTOP FOLLOW-UP VISIT: CPT | Mod: 25 | Performed by: PODIATRIST

## 2020-08-19 PROCEDURE — G0463 HOSPITAL OUTPT CLINIC VISIT: HCPCS

## 2020-08-19 PROCEDURE — 11721 DEBRIDE NAIL 6 OR MORE: CPT | Performed by: PODIATRIST

## 2020-08-19 ASSESSMENT — PAIN SCALES - GENERAL: PAINLEVEL: NO PAIN (0)

## 2020-08-19 NOTE — NURSING NOTE
"Chief Complaint   Patient presents with     Surgical Followup       Initial /70 (BP Location: Right arm, Patient Position: Sitting, Cuff Size: Adult Regular)   Pulse 60   Temp 96.7  F (35.9  C) (Tympanic)   Wt 88.5 kg (195 lb)   SpO2 95%   BMI 28.80 kg/m   Estimated body mass index is 28.8 kg/m  as calculated from the following:    Height as of 7/22/20: 1.753 m (5' 9\").    Weight as of this encounter: 88.5 kg (195 lb).  Medication Reconciliation: complete    Norma Salvador LPN  "

## 2020-08-19 NOTE — PROGRESS NOTES
Chief complaint: Patient presents with:  Surgical Followup      History of Present Illness: This 84 year old male is seen for a post-op following an I&D with a partial first ray amputation of the RIGHT foot on 07/15/2020.    He has resumed wearing a regular shoe. He has had no pain on his foot and he has had no drainage and no open wounds. He has no issues with balance. He has an appointment to be fit for DM shoes and inserts on 09/25/2020.    No further pedal complaints today.       Patient does not use tobacco products.     Last HbA1C was 6.0% on 01/13/2020.        /70 (BP Location: Right arm, Patient Position: Sitting, Cuff Size: Adult Regular)   Pulse 60   Temp 96.7  F (35.9  C) (Tympanic)   Wt 88.5 kg (195 lb)   SpO2 95%   BMI 28.80 kg/m      Patient Active Problem List   Diagnosis     Acute osteomyelitis of right foot (H)     Diabetic foot infection (H)     Osteomyelitis (H)     Abdominal aortic aneurysm (AAA) without rupture (H)     Benign non-nodular prostatic hyperplasia with lower urinary tract symptoms     Diabetic polyneuropathy associated with type 2 diabetes mellitus (H)     Hyperlipidemia associated with type 2 diabetes mellitus (H)     Hypertension associated with diabetes (H)     PAD (peripheral artery disease) (H)     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)     Research exam     Statin intolerance     Type 2 diabetes mellitus with stage 3 chronic kidney disease, without long-term current use of insulin (H)       Past Surgical History:   Procedure Laterality Date     APPENDECTOMY       EYE SURGERY Right     lens implant     INCISION AND DRAINAGE FOOT, COMBINED Right 7/15/2020    Procedure: Right foot incision and drainage with first ray amputation;  Surgeon: Erin Harris DPM;  Location: [facility]     JOINT REPLACEMENT Right      TONSILLECTOMY         Current Outpatient Medications   Medication     allopurinol (ZYLOPRIM) 100 MG tablet     amoxicillin (AMOXIL) 500 MG capsule      aspirin (ASA) 81 MG chewable tablet     atenolol (TENORMIN) 25 MG tablet     calcium carbonate (OS-FADY 600 MG [place]. [place]) 600 MG tablet     fluocinonide (LIDEX) 0.05 % external ointment     losartan (COZAAR) 25 MG tablet     Multiple Vitamin (MULTIVITAMINS PO)     naproxen sodium (ANAPROX) 220 MG tablet     pravastatin (PRAVACHOL) 10 MG tablet     tamsulosin (FLOMAX) 0.4 MG capsule     No current facility-administered medications for this visit.           Allergies   Allergen Reactions     Flu Virus Vaccine Anaphylaxis     Altace [Ramipril] Other (See Comments)     Dizziness       Ciprofloxacin Diarrhea     Hmg-Coa-R Inhibitors Muscle Pain (Myalgia)     Statin Intolerance Documentation  1. Unable to tolerate at least 2 statins: Crestor at the lowest starting daily dose and Zocor at any daily dose, due to reported symptoms which are temporally related to statin treatment and reversible upon statin discontinuation and other causes have been excluded.     Lisinopril Other (See Comments)     Dizziness       Levaquin [Levofloxacin] Unknown     Sulfa Drugs Unknown     Celebrex [Celecoxib] Rash     Hydrocodone Rash     Niacin Itching       Family History   Problem Relation Age of Onset     Cancer No family hx of        Social History     Socioeconomic History     Marital status:      Spouse name: Not on file     Number of children: Not on file     Years of education: Not on file     Highest education level: Not on file   Occupational History     Not on file   Social Needs     Financial resource strain: Not on file     Food insecurity     Worry: Not on file     Inability: Not on file     Transportation needs     Medical: Not on file     Non-medical: Not on file   Tobacco Use     Smoking status: Former Smoker     Years: 50.00     Types: Cigarettes     Last attempt to quit: 2001     Years since quittin.5   Substance and Sexual Activity     Alcohol use: No     Drug use: Not on file     Sexual activity: Not on  file   Lifestyle     Physical activity     Days per week: Not on file     Minutes per session: Not on file     Stress: Not on file   Relationships     Social connections     Talks on phone: Not on file     Gets together: Not on file     Attends Mu-ism service: Not on file     Active member of club or organization: Not on file     Attends meetings of clubs or organizations: Not on file     Relationship status: Not on file     Intimate partner violence     Fear of current or ex partner: Not on file     Emotionally abused: Not on file     Physically abused: Not on file     Forced sexual activity: Not on file   Other Topics Concern     Parent/sibling w/ CABG, MI or angioplasty before 65F 55M? Not Asked   Social History Narrative     Not on file       ROS: 10 point ROS neg other than the symptoms noted above in the HPI.  EXAM  Constitutional: healthy, alert and no distress    Psychiatric: mentation appears normal and affect normal/bright    VASCULAR:  -Dorsalis pedis pulse +2/4 b/l  -Posterior tibial pulse +1/4 b/l  -Capillary refill time < 3 seconds to b/l hallux  -Hair growth Absent to b/l anterior legs and ankles  NEURO:  -Light touch sensation intact to b/l plantar forefoot  DERM:  -Skin temperature, texture and turgor WNL b/l  -Toenails elongated, thickened, dystrophic and discolored x 10    RIGHT partial first ray amputation (DOS 07/15/2020)  -Skin along incision sites is well approximated with no dehiscence  ---Loose scab removed from distal half of incision -- skin healed beneath loose scab  ---One proximal suture in place -- skin healed post removal of suture  ---Minimal erythema (blancheable) directly around incision sit -- par with post-operative course  ---Mild cal-incision edema and no ecchymois--par with post-operative course.   ---No dark or ascending erythema, no moderate calor, no malodor, no purulence, no other SOI.    MSK:  -No pain on palpation to RIGHT foot partial first ray amputation  site  -Muscle strength of ankles +5/5 for dorsiflexion, plantarflexion, ABDUction and ADDuction b/l    MRI RIGHT FOOT 07/02/2020  Soft Tissues: Persistent narrow plantar soft tissue defect overlying the medial 1st metatarsal head which extends up to the tibial sesamoid bone (series 11 image 18). Small peripherally enhancing fluid collection measuring 1.0 x 0.4 cm dorsomedial to the 1st metatarsal head (series 11 image 20) with an adjacent medial skin sinus tract.    IMPRESSION:  1.  Acute osteomyelitis of the 1st metatarsal head and tibial sesamoid. Small 1.0 cm abscess within the soft tissues dorsomedial to the 1st metatarsal head with an adjacent skin sinus tract.  2.  First metatarsophalangeal joint synovitis.  3.  Mild flexor hallucis longus tendinosis and tenosynovitis.    SURGICAL PATHOLOGY FROM RIGHT 1ST PARTIAL FIRST RAY AMPUTATION 07/15/2020  FINAL DIAGNOSIS:   Right foot, first ray, incision and drainage with amputation   - Acute osteomyelitis   - Early chronic osteomyelitis     ============================================================    ASSESSMENT:  (M86.171) Acute osteomyelitis of right foot (H)  (primary encounter diagnosis)    (E11.628,  L08.9) Diabetic foot infection (H)    (L60.3) Onychodystrophy    (E11.9) Diabetes mellitus type 2, noninsulin dependent (H)    (E11.42) Diabetic polyneuropathy associated with type 2 diabetes mellitus (H)      PLAN:  -Patient evaluated and examined. Treatment options discussed with no educational barriers noted.  -Post-op: Right foot incision and drainage with amputation of infected bone  -DOS: 07/15/2020    -Reviewed post-op radiographs: No significant changes -- amputation site is stable    Offloading:  ---Continue regular shoe gear  ---DM shoes: Appointment with the orthotist, Jo Ann Carranza on 08/25/2020 to be fit for DM shoes and inserts    Dressing:  -Applied dry gauze dressing daily to protect scab -- continue for two more weeks    -Nails debrided x 10 without  incident  -Diabetic Foot Education provided. This included checking the feet daily looking for new new blisters or wounds, wearing shoes at all times when walking including around the house, and avoiding lotion application between the toes. Any sign of infection in the foot warrant's the patient presenting to the ED as soon as possible.    -Patient in agreement with the above treatment plan and all of patient's questions were answered.      RTC 63+ days for diabetic foot exam and high risk nail debridement       Erin Harris DPM

## 2020-09-10 ENCOUNTER — TELEPHONE (OUTPATIENT)
Dept: PODIATRY | Facility: OTHER | Age: 85
End: 2020-09-10

## 2020-09-10 NOTE — TELEPHONE ENCOUNTER
Returned a call to patient and left a vm to return call to make an appointment for today. Patient had called early and stated his foot looks angry.

## 2020-09-16 ENCOUNTER — OFFICE VISIT (OUTPATIENT)
Dept: PODIATRY | Facility: OTHER | Age: 85
End: 2020-09-16
Attending: PODIATRIST
Payer: MEDICARE

## 2020-09-16 VITALS
RESPIRATION RATE: 14 BRPM | HEIGHT: 69 IN | HEART RATE: 60 BPM | SYSTOLIC BLOOD PRESSURE: 112 MMHG | OXYGEN SATURATION: 96 % | BODY MASS INDEX: 29.47 KG/M2 | TEMPERATURE: 97.7 F | WEIGHT: 199 LBS | DIASTOLIC BLOOD PRESSURE: 70 MMHG

## 2020-09-16 DIAGNOSIS — M72.2 PLANTAR FASCIAL FIBROMATOSIS: ICD-10-CM

## 2020-09-16 DIAGNOSIS — I83.12 VARICOSE VEINS OF BOTH LOWER EXTREMITIES WITH INFLAMMATION: ICD-10-CM

## 2020-09-16 DIAGNOSIS — E11.42 DIABETIC POLYNEUROPATHY ASSOCIATED WITH TYPE 2 DIABETES MELLITUS (H): ICD-10-CM

## 2020-09-16 DIAGNOSIS — I83.11 VARICOSE VEINS OF BOTH LOWER EXTREMITIES WITH INFLAMMATION: ICD-10-CM

## 2020-09-16 DIAGNOSIS — E11.9 DIABETES MELLITUS TYPE 2, NONINSULIN DEPENDENT (H): ICD-10-CM

## 2020-09-16 DIAGNOSIS — I78.1 TELANGIECTASIAS: ICD-10-CM

## 2020-09-16 DIAGNOSIS — L60.3 ONYCHODYSTROPHY: Primary | ICD-10-CM

## 2020-09-16 DIAGNOSIS — Z89.431 STATUS POST PARTIAL AMPUTATION OF FOOT, RIGHT (H): ICD-10-CM

## 2020-09-16 PROCEDURE — G0463 HOSPITAL OUTPT CLINIC VISIT: HCPCS

## 2020-09-16 PROCEDURE — 99024 POSTOP FOLLOW-UP VISIT: CPT | Performed by: PODIATRIST

## 2020-09-16 ASSESSMENT — PAIN SCALES - GENERAL: PAINLEVEL: MILD PAIN (3)

## 2020-09-16 ASSESSMENT — MIFFLIN-ST. JEOR: SCORE: 1583.04

## 2020-09-16 NOTE — PROGRESS NOTES
"Chief complaint: Patient presents with:  Wound Check: post op right foot great toe removal      History of Present Illness: This 84 year old male is seen for concerns about his socks and heel pain.    His daughter thought his socks were too tight on his foot and causing restriction of blood flow to his RIGHT foot. The socks are not uncomfortable for him, but he has noticed an increase in edema to the heel. He is wondering if this is normal and how he can treat this.    He has also had increased heel pain on the RIGHT heel. He is wondering how he can treat this and why he has heel pain.    Historically, patient recently had an I&D with a partial first ray amputation of the RIGHT foot on 07/15/2020.    He has resumed wearing a regular shoes. He will receive his new, DM shoes by the orthotist, Jo Ann Carranza, around 09/21/2020.    No further pedal complaints today.       Patient does not use tobacco products.     Last HbA1C was 6.0% on 01/13/2020.        /70 (BP Location: Right arm, Cuff Size: Adult Regular)   Pulse 60   Temp 97.7  F (36.5  C) (Tympanic)   Resp 14   Ht 1.753 m (5' 9\")   Wt 90.3 kg (199 lb)   SpO2 96%   BMI 29.39 kg/m      Patient Active Problem List   Diagnosis     Acute osteomyelitis of right foot (H)     Diabetic foot infection (H)     Osteomyelitis (H)     Abdominal aortic aneurysm (AAA) without rupture (H)     Benign non-nodular prostatic hyperplasia with lower urinary tract symptoms     Diabetic polyneuropathy associated with type 2 diabetes mellitus (H)     Hyperlipidemia associated with type 2 diabetes mellitus (H)     Hypertension associated with diabetes (H)     PAD (peripheral artery disease) (H)     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)     Research exam     Statin intolerance     Type 2 diabetes mellitus with stage 3 chronic kidney disease, without long-term current use of insulin (H)       Past Surgical History:   Procedure Laterality Date     APPENDECTOMY       EYE SURGERY " Right     lens implant     INCISION AND DRAINAGE FOOT, COMBINED Right 7/15/2020    Procedure: Right foot incision and drainage with first ray amputation;  Surgeon: Erin Harris DPM;  Location: [facility]     JOINT REPLACEMENT Right      TONSILLECTOMY         Current Outpatient Medications   Medication     allopurinol (ZYLOPRIM) 100 MG tablet     amoxicillin (AMOXIL) 500 MG capsule     aspirin (ASA) 81 MG chewable tablet     atenolol (TENORMIN) 25 MG tablet     calcium carbonate (OS-FADY 600 MG [place]. [place]) 600 MG tablet     fluocinonide (LIDEX) 0.05 % external ointment     losartan (COZAAR) 25 MG tablet     Multiple Vitamin (MULTIVITAMINS PO)     naproxen sodium (ANAPROX) 220 MG tablet     pravastatin (PRAVACHOL) 10 MG tablet     tamsulosin (FLOMAX) 0.4 MG capsule     No current facility-administered medications for this visit.           Allergies   Allergen Reactions     Flu Virus Vaccine Anaphylaxis     Altace [Ramipril] Other (See Comments)     Dizziness       Ciprofloxacin Diarrhea     Hmg-Coa-R Inhibitors Muscle Pain (Myalgia)     Statin Intolerance Documentation  1. Unable to tolerate at least 2 statins: Crestor at the lowest starting daily dose and Zocor at any daily dose, due to reported symptoms which are temporally related to statin treatment and reversible upon statin discontinuation and other causes have been excluded.     Lisinopril Other (See Comments)     Dizziness       Levaquin [Levofloxacin] Unknown     Sulfa Drugs Unknown     Celebrex [Celecoxib] Rash     Hydrocodone Rash     Niacin Itching       Family History   Problem Relation Age of Onset     Cancer No family hx of        Social History     Socioeconomic History     Marital status:      Spouse name: Not on file     Number of children: Not on file     Years of education: Not on file     Highest education level: Not on file   Occupational History     Not on file   Social Needs     Financial resource strain: Not on file     Food insecurity      Worry: Not on file     Inability: Not on file     Transportation needs     Medical: Not on file     Non-medical: Not on file   Tobacco Use     Smoking status: Former Smoker     Years: 50.00     Types: Cigarettes     Last attempt to quit: 2001     Years since quittin.5   Substance and Sexual Activity     Alcohol use: No     Drug use: Not on file     Sexual activity: Not on file   Lifestyle     Physical activity     Days per week: Not on file     Minutes per session: Not on file     Stress: Not on file   Relationships     Social connections     Talks on phone: Not on file     Gets together: Not on file     Attends Taoism service: Not on file     Active member of club or organization: Not on file     Attends meetings of clubs or organizations: Not on file     Relationship status: Not on file     Intimate partner violence     Fear of current or ex partner: Not on file     Emotionally abused: Not on file     Physically abused: Not on file     Forced sexual activity: Not on file   Other Topics Concern     Parent/sibling w/ CABG, MI or angioplasty before 65F 55M? Not Asked   Social History Narrative     Not on file       ROS: 10 point ROS neg other than the symptoms noted above in the HPI.  EXAM  Constitutional: healthy, alert and no distress    Psychiatric: mentation appears normal and affect normal/bright    VASCULAR:  -Dorsalis pedis pulse +2/4 b/l  -Posterior tibial pulse +1/4 b/l  -Capillary refill time < 3 seconds to b/l hallux  -Hair growth Absent to b/l anterior legs and ankles  -Varicosities and telangiectasias to bilateral feet  -Mild-to-moderate 2+ to 3+ pitting edema to RIGHT foot and ankle and 1+ to LEFT foot and ankle    NEURO:  -Light touch sensation intact to b/l plantar forefoot  DERM:  -Skin temperature, texture and turgor WNL b/l  -Toenails elongated, thickened, dystrophic and discolored x 10  -Cicatrix to RIGHT dorsal first ray -- healed with no open wounds    MSK:  -Mild Pain on  palpation to RIGHT medial calcaneus near the medial tubercle  -No pain on palpation to RIGHT foot partial first ray amputation site  -Muscle strength of ankles +5/5 for dorsiflexion, plantarflexion, ABDUction and ADDuction b/l  ============================================================    ASSESSMENT:  (L60.3) Onychodystrophy  (primary encounter diagnosis)    (M72.2) Plantar fascial fibromatosis    (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 Treatment options discussed with no educational barriers noted.  Edema:   -Discussed LOWER EXTREMITY edema in detail with patient including how his recent surgery has likely contributed to the edema. Also explained the difference between arterial and venous blood flow and how compression socks assist with venous flow without moderately restricting arterial flow.  -The following was described with the patient and provided for him in instructions:  Your socks are not too tight, but your swelling in your RIGHT leg is causing the indentation on your skin.  You have good arterial blood flow to your foot, but your veins are not working well which is partly why you have inflammation in both feet and legs. The recent amputation on your RIGHT leg also contributes to chronic swelling of the RIGHT foot and leg (this is why the inflammation is worse on your RIGHT leg than your LEFT).  ---Even tighter socks than the ones you have will likely benefit you the most, but they need to be knee high compression socks. Tight compression socks will not limit blood flow (arterial blood flow bringing blood down to the foot), but it will assist the venous blood flow to get the blood back to  your heart. This will also decrease the inflammation in your foot and leg when you are wearing the sock.  -Compression socks: Advised to wear compression socks all day (especially when working) to decrease LOWER EXTREMITY swelling in both feet and legs. Apply the socks first thing in the morning before getting out of bed and remove them at night when the feet are elevated in bed.  -A message was sent to the orthotist, Jo Ann Carranza, to see if she can fit you for compression socks when you are dispensed your diabetic shoes.      Heel pain:  -Discussed plantar fascia pain including potential etiologies (including a biomechanical change in his gait after his recent partial amputation of his RIGHT 1st ray) and treatment options. Patient's pain was likely started due to a combination of factors that can include but are not limited to worn or improper shoe gear, sudden change in shoe gear, change in activity, imbalanced biomechanics (such as the patient's bilateral equinus deformity of the calf muscles).  ---Discussed conservative treatment options including compression socks, icing, elevating, resting, injections, PT, change in shoe gear (including proper shoe gear around the house), night splints. At this time, patient would like to proceed with the below treatment options:    -Compression socks may help decrease this pain.  -Stability Shoe Gear: This involves wearing a solid tennis shoe that bends at the toe, but has a solid midfoot shank and heel contour.   -Stretching: Stretch the calf muscles to increase flexibility of the calf muscles. If possible, aim to stretch the calf muscles for a combined total of one hour per day. Also, pull the big toe back while massaging the painful area of your heel.  -Icing: Ice the bottom of the foot minimally once a day for ten minutes per foot with a frozen water bottle. Ice after any extended amount of time on your feet.  -Consider supportive shoes around the house and avoid barefoot  and sock walking  -Patient declined an injection at this time  -Call the clinic if your heel pain worsens      -Patient in agreement with the above treatment plan and all of patient's questions were answered.      RTC 63+ days from 08/19/2020 for diabetic foot exam and high risk nail debridement       Erin Harris DPM

## 2020-09-16 NOTE — NURSING NOTE
"Chief Complaint   Patient presents with     Wound Check     post op right foot great toe removal       Initial /70 (BP Location: Right arm, Cuff Size: Adult Regular)   Pulse 60   Temp 97.7  F (36.5  C) (Tympanic)   Resp 14   Ht 1.753 m (5' 9\")   Wt 90.3 kg (199 lb)   SpO2 96%   BMI 29.39 kg/m   Estimated body mass index is 29.39 kg/m  as calculated from the following:    Height as of this encounter: 1.753 m (5' 9\").    Weight as of this encounter: 90.3 kg (199 lb).  Medication Reconciliation: complete  Chio Enamorado LPN  "

## 2020-09-16 NOTE — PATIENT INSTRUCTIONS
Socks:  Your socks are not too tight, but your swelling in your RIGHT leg is causing the indentation on your skin.  You have good arterial blood flow to your foot, but your veins are not working well which is partly why you have inflammation in both feet and legs. The recent amputation on your RIGHT leg also contributes to chronic swelling of the RIGHT foot and leg (this is why the inflammation is worse on your RIGHT leg than your LEFT).  ---Even tighter socks than the ones you have will likely benefit you the most, but they need to be knee high compression socks. Tight compression socks will not limit blood flow (arterial blood flow bringing blood down to the foot), but it will assist the venous blood flow to get the blood back to your heart. This will also decrease the inflammation in your foot and leg when you are wearing the sock.  -Compression socks: Advised to wear compression socks all day (especially when working) to decrease LOWER EXTREMITY swelling in both feet and legs. Apply the socks first thing in the morning before getting out of bed and remove them at night when the feet are elevated in bed.  -A message was sent to the orthotist, Jo Ann Carranza, to see if she can fit you for compression socks when you are dispensed your diabetic shoes.    Heel pain:   -This is plantar fascia pain and is likely related to the amount of inlammation you have had in your foot as well as a change in the way you have been walking due to the recent amputation of your RIGHT foot.   -Compression socks may help decrease this pain.  -Stability Shoe Gear: This involves wearing a solid tennis shoe that bends at the toe, but has a solid midfoot shank and heel contour.   -Stretching: Stretch the calf muscles to increase flexibility of the calf muscles. If possible, aim to stretch the calf muscles for a combined total of one hour per day. Also, pull the big toe back while massaging the painful area of your heel.  -Icing: Ice the bottom  of the foot minimally once a day for ten minutes per foot with a frozen water bottle. Ice after any extended amount of time on your feet.  -Consider supportive shoes around the house and avoid barefoot and sock walking  -Call the clinic if your heel pain worsens

## 2020-11-03 NOTE — PROGRESS NOTES
Chief complaint: Patient presents with:  RECHECK: Toenail Trim      History of Present Illness: This 85 year old male is seen for follow-up management for a diabetic foot exam and high risk nail debridement.     His RIGHT heel pain has improved since wearing his new DM shoes .     Patient received new DM shoes and inserts from the orthotist, Jo Ann Carranza, in September, 2020 and the shoes are comfortable. He was unable to get compression socks because they were not covered by his insurance. He was told by the orthotist that he could find them at [facility]. He says he hasn't purchased them yet because he has been wearing shorts and he didn't want to look like a nerd.     He still sometimes gets plantar and lateral foot pain when walking but he says it only causes a little limp. The pain is lessening over time.     Patient does get burning, tingling, and numbness in his feet -- he says he always has this with more numbness than anything.    Lastly, patient has had a lot of edema in the RIGHT foot so he wants to know if this is normal.      Patient does not use tobacco products.     Last HbA1C was 6.2% on 07/22/2020.    Last HbA1C was 6.0% on 01/13/2020.      -----------------------------------------------------    Historically, patient recently had an I&D with a partial first ray amputation of the RIGHT foot on 07/15/2020.        /60   Pulse 50   Temp 96.7  F (35.9  C) (Tympanic)   Wt 95.3 kg (210 lb)   SpO2 97%   BMI 31.01 kg/m      Patient Active Problem List   Diagnosis     Acute osteomyelitis of right foot (H)     Diabetic foot infection (H)     Osteomyelitis (H)     Abdominal aortic aneurysm (AAA) without rupture (H)     Benign non-nodular prostatic hyperplasia with lower urinary tract symptoms     Diabetic polyneuropathy associated with type 2 diabetes mellitus (H)     Hyperlipidemia associated with type 2 diabetes mellitus (H)     Hypertension associated with diabetes (H)     PAD (peripheral artery  disease) (H)     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)     Research exam     Statin intolerance     Type 2 diabetes mellitus with stage 3 chronic kidney disease, without long-term current use of insulin (H)       Past Surgical History:   Procedure Laterality Date     APPENDECTOMY       EYE SURGERY Right     lens implant     INCISION AND DRAINAGE FOOT, COMBINED Right 7/15/2020    Procedure: Right foot incision and drainage with first ray amputation;  Surgeon: Erin Harris DPM;  Location: [facility]     JOINT REPLACEMENT Right      TONSILLECTOMY         Current Outpatient Medications   Medication     allopurinol (ZYLOPRIM) 100 MG tablet     aspirin (ASA) 81 MG chewable tablet     atenolol (TENORMIN) 25 MG tablet     calcium carbonate (OS-FADY 600 MG [place]. [place]) 600 MG tablet     fluocinonide (LIDEX) 0.05 % external ointment     losartan (COZAAR) 25 MG tablet     Multiple Vitamin (MULTIVITAMINS PO)     naproxen sodium (ANAPROX) 220 MG tablet     pravastatin (PRAVACHOL) 10 MG tablet     tamsulosin (FLOMAX) 0.4 MG capsule     amoxicillin (AMOXIL) 500 MG capsule     No current facility-administered medications for this visit.           Allergies   Allergen Reactions     Flu Virus Vaccine Anaphylaxis     Altace [Ramipril] Other (See Comments)     Dizziness       Ciprofloxacin Diarrhea     Hmg-Coa-R Inhibitors Muscle Pain (Myalgia)     Statin Intolerance Documentation  1. Unable to tolerate at least 2 statins: Crestor at the lowest starting daily dose and Zocor at any daily dose, due to reported symptoms which are temporally related to statin treatment and reversible upon statin discontinuation and other causes have been excluded.     Lisinopril Other (See Comments)     Dizziness       Levaquin [Levofloxacin] Unknown     Sulfa Drugs Unknown     Celebrex [Celecoxib] Rash     Hydrocodone Rash     Niacin Itching       Family History   Problem Relation Age of Onset     Cancer No family hx of        Social History      Socioeconomic History     Marital status:      Spouse name: Not on file     Number of children: Not on file     Years of education: Not on file     Highest education level: Not on file   Occupational History     Not on file   Social Needs     Financial resource strain: Not on file     Food insecurity     Worry: Not on file     Inability: Not on file     Transportation needs     Medical: Not on file     Non-medical: Not on file   Tobacco Use     Smoking status: Former Smoker     Years: 50.00     Types: Cigarettes     Last attempt to quit: 2001     Years since quittin.5   Substance and Sexual Activity     Alcohol use: No     Drug use: Not on file     Sexual activity: Not on file   Lifestyle     Physical activity     Days per week: Not on file     Minutes per session: Not on file     Stress: Not on file   Relationships     Social connections     Talks on phone: Not on file     Gets together: Not on file     Attends Judaism service: Not on file     Active member of club or organization: Not on file     Attends meetings of clubs or organizations: Not on file     Relationship status: Not on file     Intimate partner violence     Fear of current or ex partner: Not on file     Emotionally abused: Not on file     Physically abused: Not on file     Forced sexual activity: Not on file   Other Topics Concern     Parent/sibling w/ CABG, MI or angioplasty before 65F 55M? Not Asked   Social History Narrative     Not on file       ROS: 10 point ROS neg other than the symptoms noted above in the HPI.  EXAM  Constitutional: healthy, alert and no distress    Psychiatric: mentation appears normal and affect normal/bright    VASCULAR:  -Dorsalis pedis pulse +2/4 b/l  -Posterior tibial pulse +1/4 b/l  -Capillary refill time < 3 seconds to b/l hallux  -Hair growth Absent to b/l anterior legs and ankles  -Varicosities and telangiectasias to bilateral feet  -Mild-to-moderate 2+ to 3+ pitting edema to RIGHT foot and  ankle and 1+ to LEFT foot and ankle  NEURO:  -Protective sensation diminished with SWM +1/10 RIGHT and +0/10 LEFT on 11/04/2020  -Light touch sensation intact to b/l plantar forefoot  DERM:  -Skin temperature, texture and turgor WNL b/l  -Toenails elongated, thickened, dystrophic and discolored x 10  -Cicatrix to RIGHT dorsal first ray -- healed with no open wounds    MSK:  -No remaining pain on palpation to RIGHT medial calcaneus near the medial tubercle  -No pain on palpation to RIGHT foot partial first ray amputation site  -Muscle strength of ankles +5/5 for dorsiflexion, plantarflexion, ABDUction and ADDuction b/l    RIGHT FOOT RADIOGRAPH 11/04/2020                                                                   Impression:  Healing fracture involving the proximal aspect of the second metatarsal with prominent periosteal reaction/callus formation.  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 Treatment options discussed with no educational barriers noted.  -Diabetic Foot Education provided. This included checking the feet daily looking for new new blisters or wounds, wearing shoes at all times when walking including around the house, and avoiding lotion application between the toes. If there are any signs of infection, the patient should present to the ED as soon as possible. Infections of  the foot can be life threatening or lead to amputations of the foot or leg.  -Nails debrided x 10 without incident    -DM shoes: Dispensed at the end of September, 2020, with compression socks. Shoes are comfortable and working well for him.    -Advised patient to start wearing compression socks     -Radiographs ordered to evaluate RIGHT foot with the amount of edema to the foot  ---New fracture of the 2nd metatarsal due to the new pressure distribution after a partial first ray amputation. This accounts for the edema in his foot. Advise patient to return to his post-op CAM boot. A new one will be ordered if he does not still have this boot.  ---Follow-up in one month. Will order follow-up radiographs at follow-up appointment to evaluate healing of 2nd metatarsal fracture.    -Foot exam for DM patient with LOPS    -Patient in agreement with the above treatment plan and all of patient's questions were answered.    ---Follow-up in one month in [facility]. Will order follow-up radiographs at follow-up appointment to evaluate healing of 2nd metatarsal fracture.  ---Follow-up also in 63+ days for diabetic foot exam and high risk nail debridement       Erin Harris DPM

## 2020-11-04 ENCOUNTER — OFFICE VISIT (OUTPATIENT)
Dept: PODIATRY | Facility: OTHER | Age: 85
End: 2020-11-04
Attending: PODIATRIST
Payer: MEDICARE

## 2020-11-04 ENCOUNTER — ANCILLARY PROCEDURE (OUTPATIENT)
Dept: GENERAL RADIOLOGY | Facility: OTHER | Age: 85
End: 2020-11-04
Attending: PODIATRIST
Payer: MEDICARE

## 2020-11-04 VITALS
OXYGEN SATURATION: 97 % | TEMPERATURE: 96.7 F | BODY MASS INDEX: 31.01 KG/M2 | SYSTOLIC BLOOD PRESSURE: 126 MMHG | DIASTOLIC BLOOD PRESSURE: 60 MMHG | WEIGHT: 210 LBS | HEART RATE: 50 BPM

## 2020-11-04 DIAGNOSIS — Z89.431 STATUS POST PARTIAL AMPUTATION OF FOOT, RIGHT (H): ICD-10-CM

## 2020-11-04 DIAGNOSIS — E11.9 DIABETES MELLITUS TYPE 2, NONINSULIN DEPENDENT (H): ICD-10-CM

## 2020-11-04 DIAGNOSIS — M21.969 LOSS OF PROTECTIVE SENSATION OF SKIN OF DEFORMED FOOT: ICD-10-CM

## 2020-11-04 DIAGNOSIS — L60.3 ONYCHODYSTROPHY: Primary | ICD-10-CM

## 2020-11-04 DIAGNOSIS — E11.42 DIABETIC POLYNEUROPATHY ASSOCIATED WITH TYPE 2 DIABETES MELLITUS (H): ICD-10-CM

## 2020-11-04 DIAGNOSIS — I78.1 TELANGIECTASIAS: ICD-10-CM

## 2020-11-04 DIAGNOSIS — R20.8 LOSS OF PROTECTIVE SENSATION OF SKIN OF DEFORMED FOOT: ICD-10-CM

## 2020-11-04 DIAGNOSIS — I83.11 VARICOSE VEINS OF BOTH LOWER EXTREMITIES WITH INFLAMMATION: ICD-10-CM

## 2020-11-04 DIAGNOSIS — M72.2 PLANTAR FASCIAL FIBROMATOSIS: ICD-10-CM

## 2020-11-04 DIAGNOSIS — Z86.31 PERSONAL HISTORY OF DIABETIC FOOT ULCER: ICD-10-CM

## 2020-11-04 DIAGNOSIS — I83.12 VARICOSE VEINS OF BOTH LOWER EXTREMITIES WITH INFLAMMATION: ICD-10-CM

## 2020-11-04 PROCEDURE — 73630 X-RAY EXAM OF FOOT: CPT | Mod: TC,RT,FY

## 2020-11-04 PROCEDURE — 99213 OFFICE O/P EST LOW 20 MIN: CPT | Mod: 25 | Performed by: PODIATRIST

## 2020-11-04 PROCEDURE — 11721 DEBRIDE NAIL 6 OR MORE: CPT | Performed by: PODIATRIST

## 2020-11-04 PROCEDURE — G0463 HOSPITAL OUTPT CLINIC VISIT: HCPCS

## 2020-11-04 ASSESSMENT — PAIN SCALES - GENERAL: PAINLEVEL: MILD PAIN (2)

## 2020-11-04 NOTE — NURSING NOTE
"Chief Complaint   Patient presents with     RECHECK     Toenail Trim       Initial /60   Pulse 50   Temp 96.7  F (35.9  C) (Tympanic)   Wt 95.3 kg (210 lb)   SpO2 97%   BMI 31.01 kg/m   Estimated body mass index is 31.01 kg/m  as calculated from the following:    Height as of 9/16/20: 1.753 m (5' 9\").    Weight as of this encounter: 95.3 kg (210 lb).  Medication Reconciliation: complete  Tamica Baltazar LPN  "

## 2020-12-02 ENCOUNTER — ANCILLARY PROCEDURE (OUTPATIENT)
Dept: GENERAL RADIOLOGY | Facility: OTHER | Age: 85
End: 2020-12-02
Attending: PODIATRIST
Payer: MEDICARE

## 2020-12-02 ENCOUNTER — OFFICE VISIT (OUTPATIENT)
Dept: PODIATRY | Facility: OTHER | Age: 85
End: 2020-12-02
Attending: PODIATRIST
Payer: MEDICARE

## 2020-12-02 VITALS
HEIGHT: 69 IN | SYSTOLIC BLOOD PRESSURE: 130 MMHG | RESPIRATION RATE: 16 BRPM | WEIGHT: 211 LBS | OXYGEN SATURATION: 98 % | DIASTOLIC BLOOD PRESSURE: 64 MMHG | HEART RATE: 65 BPM | BODY MASS INDEX: 31.25 KG/M2 | TEMPERATURE: 97.5 F

## 2020-12-02 DIAGNOSIS — Z89.431 STATUS POST PARTIAL AMPUTATION OF FOOT, RIGHT (H): ICD-10-CM

## 2020-12-02 DIAGNOSIS — R20.8 LOSS OF PROTECTIVE SENSATION OF SKIN OF DEFORMED FOOT: ICD-10-CM

## 2020-12-02 DIAGNOSIS — E11.9 DIABETES MELLITUS TYPE 2, NONINSULIN DEPENDENT (H): ICD-10-CM

## 2020-12-02 DIAGNOSIS — S92.324D NONDISPLACED FRACTURE OF SECOND METATARSAL BONE, RIGHT FOOT, SUBSEQUENT ENCOUNTER FOR FRACTURE WITH ROUTINE HEALING: Primary | ICD-10-CM

## 2020-12-02 DIAGNOSIS — I78.1 TELANGIECTASIAS: ICD-10-CM

## 2020-12-02 DIAGNOSIS — M21.969 LOSS OF PROTECTIVE SENSATION OF SKIN OF DEFORMED FOOT: ICD-10-CM

## 2020-12-02 DIAGNOSIS — L60.3 ONYCHODYSTROPHY: ICD-10-CM

## 2020-12-02 DIAGNOSIS — S92.324D NONDISPLACED FRACTURE OF SECOND METATARSAL BONE, RIGHT FOOT, SUBSEQUENT ENCOUNTER FOR FRACTURE WITH ROUTINE HEALING: ICD-10-CM

## 2020-12-02 DIAGNOSIS — I83.11 VARICOSE VEINS OF BOTH LOWER EXTREMITIES WITH INFLAMMATION: ICD-10-CM

## 2020-12-02 DIAGNOSIS — E11.42 DIABETIC POLYNEUROPATHY ASSOCIATED WITH TYPE 2 DIABETES MELLITUS (H): ICD-10-CM

## 2020-12-02 DIAGNOSIS — I83.12 VARICOSE VEINS OF BOTH LOWER EXTREMITIES WITH INFLAMMATION: ICD-10-CM

## 2020-12-02 DIAGNOSIS — Z86.31 PERSONAL HISTORY OF DIABETIC FOOT ULCER: ICD-10-CM

## 2020-12-02 PROCEDURE — 73630 X-RAY EXAM OF FOOT: CPT | Mod: TC,RT,FY

## 2020-12-02 PROCEDURE — G0463 HOSPITAL OUTPT CLINIC VISIT: HCPCS

## 2020-12-02 PROCEDURE — 99213 OFFICE O/P EST LOW 20 MIN: CPT | Performed by: PODIATRIST

## 2020-12-02 ASSESSMENT — PAIN SCALES - GENERAL: PAINLEVEL: NO PAIN (0)

## 2020-12-02 ASSESSMENT — MIFFLIN-ST. JEOR: SCORE: 1632.47

## 2020-12-02 NOTE — PROGRESS NOTES
"Chief complaint: Patient presents with:  Fracture: recheck right foot fracture      History of Present Illness: This 85 year old male is seen for follow-up management for a diabetic foot exam and high risk nail debridement.     Patient developed a RIGHT foot 2nd metatarsal fracture after his first ray amputation. He has been wearing a long leg CAM walker boot since 11/04/2020. He has a   little pain in the foot but it is much improved when he is wearing the CAM boot.    Patient has new DM shoes from the orthotist, Jo Ann Carranza, from September, 2020, but he is wearing an old pair of shoes today.    Patient says the edema in his RIGHT foot has greatly improved and has been improved for a long time.      Patient does not use tobacco products.     Last HbA1C was 6.2% on 07/22/2020.    Last HbA1C was 6.0% on 01/13/2020.      -----------------------------------------------------    Historically, patient recently had an I&D with a partial first ray amputation of the RIGHT foot on 07/15/2020.        /64 (BP Location: Left arm, Cuff Size: Adult Large)   Pulse 65   Temp 97.5  F (36.4  C) (Tympanic)   Resp 16   Ht 1.753 m (5' 9\")   Wt 95.7 kg (211 lb)   SpO2 98%   BMI 31.16 kg/m      Patient Active Problem List   Diagnosis     Acute osteomyelitis of right foot (H)     Diabetic foot infection (H)     Osteomyelitis (H)     Abdominal aortic aneurysm (AAA) without rupture (H)     Benign non-nodular prostatic hyperplasia with lower urinary tract symptoms     Diabetic polyneuropathy associated with type 2 diabetes mellitus (H)     Hyperlipidemia associated with type 2 diabetes mellitus (H)     Hypertension associated with diabetes (H)     PAD (peripheral artery disease) (H)     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)     Research exam     Statin intolerance     Type 2 diabetes mellitus with stage 3 chronic kidney disease, without long-term current use of insulin (H)       Past Surgical History:   Procedure Laterality " Date     APPENDECTOMY       EYE SURGERY Right     lens implant     INCISION AND DRAINAGE FOOT, COMBINED Right 7/15/2020    Procedure: Right foot incision and drainage with first ray amputation;  Surgeon: Erin Harris DPM;  Location: [facility]     JOINT REPLACEMENT Right      TONSILLECTOMY         Current Outpatient Medications   Medication     allopurinol (ZYLOPRIM) 100 MG tablet     amoxicillin (AMOXIL) 500 MG capsule     aspirin (ASA) 81 MG chewable tablet     atenolol (TENORMIN) 25 MG tablet     calcium carbonate (OS-FADY 600 MG [place]. [place]) 600 MG tablet     fluocinonide (LIDEX) 0.05 % external ointment     losartan (COZAAR) 25 MG tablet     Multiple Vitamin (MULTIVITAMINS PO)     naproxen sodium (ANAPROX) 220 MG tablet     pravastatin (PRAVACHOL) 10 MG tablet     tamsulosin (FLOMAX) 0.4 MG capsule     No current facility-administered medications for this visit.           Allergies   Allergen Reactions     Flu Virus Vaccine Anaphylaxis     Altace [Ramipril] Other (See Comments)     Dizziness       Ciprofloxacin Diarrhea     Hmg-Coa-R Inhibitors Muscle Pain (Myalgia)     Statin Intolerance Documentation  1. Unable to tolerate at least 2 statins: Crestor at the lowest starting daily dose and Zocor at any daily dose, due to reported symptoms which are temporally related to statin treatment and reversible upon statin discontinuation and other causes have been excluded.     Lisinopril Other (See Comments)     Dizziness       Levaquin [Levofloxacin] Unknown     Sulfa Drugs Unknown     Celebrex [Celecoxib] Rash     Hydrocodone Rash     Niacin Itching       Family History   Problem Relation Age of Onset     Cancer No family hx of        Social History     Socioeconomic History     Marital status:      Spouse name: Not on file     Number of children: Not on file     Years of education: Not on file     Highest education level: Not on file   Occupational History     Not on file   Social Needs     Financial resource  strain: Not on file     Food insecurity     Worry: Not on file     Inability: Not on file     Transportation needs     Medical: Not on file     Non-medical: Not on file   Tobacco Use     Smoking status: Former Smoker     Years: 50.00     Types: Cigarettes     Last attempt to quit: 2001     Years since quittin.5   Substance and Sexual Activity     Alcohol use: No     Drug use: Not on file     Sexual activity: Not on file   Lifestyle     Physical activity     Days per week: Not on file     Minutes per session: Not on file     Stress: Not on file   Relationships     Social connections     Talks on phone: Not on file     Gets together: Not on file     Attends Druze service: Not on file     Active member of club or organization: Not on file     Attends meetings of clubs or organizations: Not on file     Relationship status: Not on file     Intimate partner violence     Fear of current or ex partner: Not on file     Emotionally abused: Not on file     Physically abused: Not on file     Forced sexual activity: Not on file   Other Topics Concern     Parent/sibling w/ CABG, MI or angioplasty before 65F 55M? Not Asked   Social History Narrative     Not on file       ROS: 10 point ROS neg other than the symptoms noted above in the HPI.  EXAM  Constitutional: healthy, alert and no distress    Psychiatric: mentation appears normal and affect normal/bright    RIGHT FOOT FOCUSED    VASCULAR:  -Dorsalis pedis pulse +2/4   -Posterior tibial pulse +1/4   -Capillary refill time < 3 seconds to  hallux  -Hair growth Absent to anterior leg and ankle  -Varicosities and telangiectasias to foot  -Mild 1+ pitting edema to RIGHT foot -- greatly improved from 2020  NEURO:  -Protective sensation diminished with SWM +1/10 RIGHT and +0/10 LEFT on 2020  -Light touch sensation intact to b/l plantar forefoot  DERM:  -Skin temperature, texture and turgor WNL b/l  -Toenails elongated, thickened, dystrophic and discolored x  4  -Cicatrix to RIGHT dorsal first ray -- healed with no open wounds    MSK:  -No Pain on palpation to the dorsal 2nd metatarsal or the 2nd TMTJ  -Muscle strength of ankles +5/5 for dorsiflexion, plantarflexion, ABDUction and ADDuction b/l    RIGHT FOOT RADIOGRAPH 11/04/2020                                                                   Impression:  Healing fracture involving the proximal aspect of the second metatarsal with prominent periosteal reaction/callus formation.  UDAY CAROLINA MD    RIGHT FOOT RADIOGRAPH 12/02/2020  IMPRESSION: Stable appearance of the right foot as compared to November 4, 2020  JONO DSOUZA MD  ============================================================    ASSESSMENT:    (S92.324D) Nondisplaced fracture of second metatarsal bone, right foot, subsequent encounter for fracture with routine healing  (primary encounter diagnosis)    (L60.3) Onychodystrophy  (primary encounter diagnosis)    (I83.11,  I83.12) Varicose veins of both lower extremities with inflammation    (I78.1) Telangiectasias    (E11.9) Diabetes mellitus type 2, noninsulin dependent (H)    (E11.42) Diabetic polyneuropathy associated with type 2 diabetes mellitus (H)    (Z89.431) Status post partial amputation of foot, right (H)    (Z86.31) Personal history of diabetic foot ulcer    (M21.969,  R20.8) Loss of protective sensation of skin of deformed foot      PLAN:  -Patient evaluated and examined. Treatment options discussed with no educational barriers noted.  -New radiographs ordered today on 12/02/2020 -- Healing of fracture site. There is possible evidence of Charcot in the midfoot. Patient will be called with the results. He is advised to even further offload the foot with crutches / walker / cane. If he cannot offload, he may receive a total contact cast and he can walk in the cast with a boot on the foot.     -Patient has no Pain on palpation to the RIGHT foot today and the edema is greatly reduced.  Radiographs show signs of healing. Recommend the patient wear the boot for one more month prior to slowly transitioning to a regular shoe. Will consider WB status after evaluating the follow-up radiographs.    -Nails last debrided on 11/04/2020    -DM shoes: Dispensed at the end of September, 2020, with compression socks. Shoes are comfortable and working well for him but he is not wearing them today    -Advised patient to start wearing compression socks     -Patient in agreement with the above treatment plan and all of patient's questions were answered.    ---Follow-up in one month in [facility]. Will order follow-up radiographs at follow-up appointment to evaluate healing of 2nd metatarsal fracture and transition to shoes if doing well.  ---Follow-up also in 63+ days from 11/04/2020 for diabetic foot exam and high risk nail debridement       Erin Harris DPM

## 2020-12-15 ENCOUNTER — OFFICE VISIT (OUTPATIENT)
Dept: PODIATRY | Facility: OTHER | Age: 85
End: 2020-12-15
Attending: PODIATRIST
Payer: MEDICARE

## 2020-12-15 ENCOUNTER — ANCILLARY PROCEDURE (OUTPATIENT)
Dept: GENERAL RADIOLOGY | Facility: OTHER | Age: 85
End: 2020-12-15
Attending: PODIATRIST
Payer: MEDICARE

## 2020-12-15 VITALS
HEART RATE: 71 BPM | SYSTOLIC BLOOD PRESSURE: 136 MMHG | OXYGEN SATURATION: 93 % | DIASTOLIC BLOOD PRESSURE: 68 MMHG | WEIGHT: 211 LBS | BODY MASS INDEX: 31.16 KG/M2 | TEMPERATURE: 97.6 F

## 2020-12-15 DIAGNOSIS — I83.11 VARICOSE VEINS OF BOTH LOWER EXTREMITIES WITH INFLAMMATION: ICD-10-CM

## 2020-12-15 DIAGNOSIS — R20.8 LOSS OF PROTECTIVE SENSATION OF SKIN OF DEFORMED FOOT: ICD-10-CM

## 2020-12-15 DIAGNOSIS — E11.42 DIABETIC POLYNEUROPATHY ASSOCIATED WITH TYPE 2 DIABETES MELLITUS (H): ICD-10-CM

## 2020-12-15 DIAGNOSIS — M14.671 CHARCOT'S JOINT OF RIGHT FOOT: Primary | ICD-10-CM

## 2020-12-15 DIAGNOSIS — M21.969 LOSS OF PROTECTIVE SENSATION OF SKIN OF DEFORMED FOOT: ICD-10-CM

## 2020-12-15 DIAGNOSIS — I83.12 VARICOSE VEINS OF BOTH LOWER EXTREMITIES WITH INFLAMMATION: ICD-10-CM

## 2020-12-15 DIAGNOSIS — Z86.31 PERSONAL HISTORY OF DIABETIC FOOT ULCER: ICD-10-CM

## 2020-12-15 DIAGNOSIS — E11.9 DIABETES MELLITUS TYPE 2, NONINSULIN DEPENDENT (H): ICD-10-CM

## 2020-12-15 DIAGNOSIS — Z89.431 STATUS POST PARTIAL AMPUTATION OF FOOT, RIGHT (H): ICD-10-CM

## 2020-12-15 DIAGNOSIS — M14.671 CHARCOT'S JOINT OF RIGHT FOOT: ICD-10-CM

## 2020-12-15 DIAGNOSIS — L60.3 ONYCHODYSTROPHY: ICD-10-CM

## 2020-12-15 DIAGNOSIS — I78.1 TELANGIECTASIAS: ICD-10-CM

## 2020-12-15 PROCEDURE — 99213 OFFICE O/P EST LOW 20 MIN: CPT | Performed by: PODIATRIST

## 2020-12-15 PROCEDURE — 73630 X-RAY EXAM OF FOOT: CPT | Mod: TC,RT

## 2020-12-15 PROCEDURE — G0463 HOSPITAL OUTPT CLINIC VISIT: HCPCS

## 2020-12-15 RX ORDER — BACLOFEN 20 MG
TABLET ORAL
COMMUNITY
End: 2022-01-01

## 2020-12-15 ASSESSMENT — PAIN SCALES - GENERAL: PAINLEVEL: NO PAIN (0)

## 2020-12-15 NOTE — PROGRESS NOTES
Chief complaint: Patient presents with:  Discuss cast      History of Present Illness: This 85 year old male is seen for follow-up management of his RIGHT foot fracture and concerning changes for Charcot.    Patient initially came in today requesting to be placed in a cast for treatment of the Charcot. However, he then was informed that he cannot drive with the cast on his leg so he does not want the cast. He would like to have Charcot explained to him and what his treatment plan will be.    Patient's wife  last week in early . Patient says he is very sad about his wife's passing. He then became upset about his foot situation and he said he wants to know why something wasn't done to treat his initial foot ulcer in the first place when he was seeing a previous provider. He says he was only initially given a wooden shoe, then he was given a boot with a plastic top (possibly a Pressure Guardian boot?). He wants to know if he could have done something else to treat his ulcer when it first developed.    Patient then became calm and asked what was wrong with his foot and how it could be treated.    Patient has new DM shoes from the orthotist, Jo Ann Carranza, from , but he has been consistently wearing a CAM boot on his RIGHT foot.    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    Patient says the edema in his RIGHT foot has greatly improved and has been improved for a long time.      Patient does not use tobacco products.     Last HbA1C was 6.2% on 2020.    Last HbA1C was 6.0% on 2020.      -----------------------------------------------------    Historically, patient recently had an I&D with a partial first ray amputation of the RIGHT foot on 07/15/2020.        /68   Pulse 71   Temp 97.6  F (36.4  C) (Tympanic)   Wt 95.7 kg (211 lb)   SpO2 93%   BMI 31.16 kg/m      Patient Active Problem  List   Diagnosis     Acute osteomyelitis of right foot (H)     Diabetic foot infection (H)     Osteomyelitis (H)     Abdominal aortic aneurysm (AAA) without rupture (H)     Benign non-nodular prostatic hyperplasia with lower urinary tract symptoms     Diabetic polyneuropathy associated with type 2 diabetes mellitus (H)     Hyperlipidemia associated with type 2 diabetes mellitus (H)     Hypertension associated with diabetes (H)     PAD (peripheral artery disease) (H)     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)     Research exam     Statin intolerance     Type 2 diabetes mellitus with stage 3 chronic kidney disease, without long-term current use of insulin (H)       Past Surgical History:   Procedure Laterality Date     APPENDECTOMY       EYE SURGERY Right     lens implant     INCISION AND DRAINAGE FOOT, COMBINED Right 7/15/2020    Procedure: Right foot incision and drainage with first ray amputation;  Surgeon: Erin Harris DPM;  Location: [facility]     JOINT REPLACEMENT Right      TONSILLECTOMY         Current Outpatient Medications   Medication     allopurinol (ZYLOPRIM) 100 MG tablet     amoxicillin (AMOXIL) 500 MG capsule     aspirin (ASA) 81 MG chewable tablet     atenolol (TENORMIN) 25 MG tablet     calcium carbonate (OS-FADY 600 MG [place]. [place]) 600 MG tablet     fluocinonide (LIDEX) 0.05 % external ointment     losartan (COZAAR) 25 MG tablet     Multiple Vitamin (MULTIVITAMINS PO)     naproxen sodium (ANAPROX) 220 MG tablet     pravastatin (PRAVACHOL) 10 MG tablet     tamsulosin (FLOMAX) 0.4 MG capsule     No current facility-administered medications for this visit.           Allergies   Allergen Reactions     Flu Virus Vaccine Anaphylaxis     Altace [Ramipril] Other (See Comments)     Dizziness       Ciprofloxacin Diarrhea     Hmg-Coa-R Inhibitors Muscle Pain (Myalgia)     Statin Intolerance Documentation  1. Unable to tolerate at least 2 statins: Crestor at the lowest starting daily dose and Zocor at any  daily dose, due to reported symptoms which are temporally related to statin treatment and reversible upon statin discontinuation and other causes have been excluded.     Lisinopril Other (See Comments)     Dizziness       Levaquin [Levofloxacin] Unknown     Sulfa Drugs Unknown     Celebrex [Celecoxib] Rash     Hydrocodone Rash     Niacin Itching       Family History   Problem Relation Age of Onset     Cancer No family hx of        Social History     Socioeconomic History     Marital status:      Spouse name: Not on file     Number of children: Not on file     Years of education: Not on file     Highest education level: Not on file   Occupational History     Not on file   Social Needs     Financial resource strain: Not on file     Food insecurity     Worry: Not on file     Inability: Not on file     Transportation needs     Medical: Not on file     Non-medical: Not on file   Tobacco Use     Smoking status: Former Smoker     Years: 50.00     Types: Cigarettes     Last attempt to quit: 2001     Years since quittin.5   Substance and Sexual Activity     Alcohol use: No     Drug use: Not on file     Sexual activity: Not on file   Lifestyle     Physical activity     Days per week: Not on file     Minutes per session: Not on file     Stress: Not on file   Relationships     Social connections     Talks on phone: Not on file     Gets together: Not on file     Attends Sabianist service: Not on file     Active member of club or organization: Not on file     Attends meetings of clubs or organizations: Not on file     Relationship status: Not on file     Intimate partner violence     Fear of current or ex partner: Not on file     Emotionally abused: Not on file     Physically abused: Not on file     Forced sexual activity: Not on file   Other Topics Concern     Parent/sibling w/ CABG, MI or angioplasty before 65F 55M? Not Asked   Social History Narrative     Not on file       ROS: 10 point ROS neg other than the  symptoms noted above in the HPI.  EXAM  Constitutional: healthy, alert and no distress    Psychiatric: mentation appears normal and affect normal/bright    RIGHT FOOT FOCUSED    VASCULAR:  -Dorsalis pedis pulse +2/4   -Posterior tibial pulse +1/4   -Capillary refill time < 3 seconds to  hallux  -Hair growth Absent to anterior leg and ankle  -Varicosities and telangiectasias to foot  -Mild 1+ pitting edema to RIGHT foot  NEURO:  -Protective sensation diminished with SWM +1/10 RIGHT and +0/10 LEFT on 11/04/2020  -Light touch sensation diminished to RIGHT plantar forefoot  DERM:  -Skin temperature, texture and turgor WNL b/l  -Toenails elongated, thickened, dystrophic and discolored x 4  -Cicatrix to RIGHT dorsal first ray -- healed with no open wounds    MSK:  -No Pain to the RIGHT foot  -Mild rocker botton deformity to the plantar lateral foot  -Muscle strength of ankles +5/5 for dorsiflexion, plantarflexion, ABDUction and ADDuction b/l    RIGHT FOOT RADIOGRAPH 11/04/2020                                                                   Impression:  Healing fracture involving the proximal aspect of the second metatarsal with prominent periosteal reaction/callus formation.  UDAY CAROLINA MD    RIGHT FOOT RADIOGRAPH 12/02/2020  IMPRESSION: Stable appearance of the right foot as compared to November 4, 2020  JONO DSOUZA MD    RADIOGRAPH RIGHT FOOT 12/15/2020  IMPRESSION: First metatarsal amputation at the level of the proximal third. There is soft tissue edema. There is some cortication of osteotomy margins suggesting a healing response.   CARLEE SHAY MD  ============================================================    ASSESSMENT:    (M14.671) Charcot's joint of right foot  (primary encounter diagnosis)    (L60.3) Onychodystrophy  (primary encounter diagnosis)    (I83.11,  I83.12) Varicose veins of both lower extremities with inflammation    (I78.1) Telangiectasias    (E11.9) Diabetes mellitus type 2,  noninsulin dependent (H)    (E11.42) Diabetic polyneuropathy associated with type 2 diabetes mellitus (H)    (Z89.431) Status post partial amputation of foot, right (H)    (Z86.31) Personal history of diabetic foot ulcer    (M21.969,  R20.8) Loss of protective sensation of skin of deformed foot      PLAN:  -Patient evaluated and examined. Treatment options discussed with no educational barriers noted.      -Discussed Charcot in detail with the patient including etilogy and treatment. The exact etiology of Charcot is unknown, but can occur in a foot with neuropathy. His bones have in a sense broken down in the midfoot, remodeled, and are re-positioning themselves in a new alignment that places him at high risk of ulcerations at the new bony prominences.   ---Charcot changes cannot be undone, but aggressive offloading can slow down the Charcot. Patient lives alone and he says he has to drive himself to most appointments. It is not legal to drive while wearing a cast and a boot on the RIGHT foot. Offloading in a total contact cast for one month is not an option. He has been wearing a CAM walker boot. He says he has no pain in his foot.  ---Sent a message to the orthotist, Jo Ann Carranza, to modify patient's shoe insert to fit his new Charcot deformity  ---Educated patient on the importance of checking his feet daily and being seen by podiatry right away with any concerns of new ulcers developing  -New radiographs ordered today on 12/15/2020 to compare to previous radiographs -- foot is similar in appearance to 12/02/2020 with some cortication of the fracture margins. Patient is going to continue wearing his CAM boot for offloading at this time.   ---Patient was called with these results  Will repeat x-rays in one month and if his foot is still stable, then will start offloading in his newly offloaded insert from the orthotist, Jo Ann Carranza.   -Patient says he can work less hours and be off his foot more.    -Explained to  the patient that from his description of offloading devices from previous providers and having been treated for the wound on his foot in the past, it sounds like his ulcer and offloading was attempted. The patient has no current skin breakdown or new areas of ulceration at this time, so his gal at this time will be offloading (wear the CAM boot as often as possible) and watching his foot daily for any new wounds or SOI.    -Patient in agreement with the above treatment plan and all of patient's questions were answered.    ---Follow-up in one month in [facility] with x-rays prior to appointment (ordered today on 12/15/2020 for follow-up on 01/20/2021 in [facility])  ---Will do high risk nail debridement at follow-up appointment          Erin Harris DPM

## 2020-12-15 NOTE — NURSING NOTE
"Chief Complaint   Patient presents with     Discuss cast       Initial /68   Pulse 71   Temp 97.6  F (36.4  C) (Tympanic)   Wt 95.7 kg (211 lb)   SpO2 93%   BMI 31.16 kg/m   Estimated body mass index is 31.16 kg/m  as calculated from the following:    Height as of 12/2/20: 1.753 m (5' 9\").    Weight as of this encounter: 95.7 kg (211 lb).  Medication Reconciliation: complete  Norma Salvador LPN    "

## 2021-01-01 ENCOUNTER — OFFICE VISIT (OUTPATIENT)
Dept: PODIATRY | Facility: OTHER | Age: 86
End: 2021-01-01
Attending: PODIATRIST
Payer: MEDICARE

## 2021-01-01 VITALS
DIASTOLIC BLOOD PRESSURE: 75 MMHG | OXYGEN SATURATION: 90 % | TEMPERATURE: 98.2 F | SYSTOLIC BLOOD PRESSURE: 132 MMHG | HEART RATE: 68 BPM

## 2021-01-01 DIAGNOSIS — I78.1 TELANGIECTASIAS: ICD-10-CM

## 2021-01-01 DIAGNOSIS — E11.42 DIABETIC POLYNEUROPATHY ASSOCIATED WITH TYPE 2 DIABETES MELLITUS (H): ICD-10-CM

## 2021-01-01 DIAGNOSIS — N18.31 STAGE 3A CHRONIC KIDNEY DISEASE (H): ICD-10-CM

## 2021-01-01 DIAGNOSIS — E11.9 DIABETES MELLITUS TYPE 2, NONINSULIN DEPENDENT (H): ICD-10-CM

## 2021-01-01 DIAGNOSIS — L60.3 ONYCHODYSTROPHY: Primary | ICD-10-CM

## 2021-01-01 DIAGNOSIS — Z86.31 PERSONAL HISTORY OF DIABETIC FOOT ULCER: ICD-10-CM

## 2021-01-01 DIAGNOSIS — M14.671 CHARCOT'S JOINT OF RIGHT FOOT: ICD-10-CM

## 2021-01-01 DIAGNOSIS — I83.11 VARICOSE VEINS OF BOTH LOWER EXTREMITIES WITH INFLAMMATION: ICD-10-CM

## 2021-01-01 DIAGNOSIS — R20.8 LOSS OF PROTECTIVE SENSATION OF SKIN OF DEFORMED FOOT: ICD-10-CM

## 2021-01-01 DIAGNOSIS — I83.12 VARICOSE VEINS OF BOTH LOWER EXTREMITIES WITH INFLAMMATION: ICD-10-CM

## 2021-01-01 DIAGNOSIS — Z89.431 STATUS POST PARTIAL AMPUTATION OF FOOT, RIGHT (H): ICD-10-CM

## 2021-01-01 DIAGNOSIS — M21.969 LOSS OF PROTECTIVE SENSATION OF SKIN OF DEFORMED FOOT: ICD-10-CM

## 2021-01-01 PROCEDURE — G0463 HOSPITAL OUTPT CLINIC VISIT: HCPCS | Mod: 25

## 2021-01-01 PROCEDURE — 11721 DEBRIDE NAIL 6 OR MORE: CPT | Performed by: PODIATRIST

## 2021-01-01 ASSESSMENT — PAIN SCALES - GENERAL: PAINLEVEL: NO PAIN (0)

## 2021-01-20 ENCOUNTER — ANCILLARY PROCEDURE (OUTPATIENT)
Dept: GENERAL RADIOLOGY | Facility: OTHER | Age: 86
End: 2021-01-20
Attending: PODIATRIST
Payer: MEDICARE

## 2021-01-20 ENCOUNTER — OFFICE VISIT (OUTPATIENT)
Dept: PODIATRY | Facility: OTHER | Age: 86
End: 2021-01-20
Attending: PODIATRIST
Payer: MEDICARE

## 2021-01-20 VITALS
DIASTOLIC BLOOD PRESSURE: 74 MMHG | HEIGHT: 69 IN | WEIGHT: 205 LBS | OXYGEN SATURATION: 96 % | TEMPERATURE: 97 F | BODY MASS INDEX: 30.36 KG/M2 | SYSTOLIC BLOOD PRESSURE: 142 MMHG | RESPIRATION RATE: 18 BRPM | HEART RATE: 54 BPM

## 2021-01-20 DIAGNOSIS — Z86.31 PERSONAL HISTORY OF DIABETIC FOOT ULCER: ICD-10-CM

## 2021-01-20 DIAGNOSIS — M21.969 LOSS OF PROTECTIVE SENSATION OF SKIN OF DEFORMED FOOT: ICD-10-CM

## 2021-01-20 DIAGNOSIS — I83.12 VARICOSE VEINS OF BOTH LOWER EXTREMITIES WITH INFLAMMATION: ICD-10-CM

## 2021-01-20 DIAGNOSIS — L97.411 DIABETIC ULCER OF RIGHT MIDFOOT ASSOCIATED WITH TYPE 2 DIABETES MELLITUS, LIMITED TO BREAKDOWN OF SKIN (H): Primary | ICD-10-CM

## 2021-01-20 DIAGNOSIS — I83.11 VARICOSE VEINS OF BOTH LOWER EXTREMITIES WITH INFLAMMATION: ICD-10-CM

## 2021-01-20 DIAGNOSIS — E11.621 DIABETIC ULCER OF RIGHT MIDFOOT ASSOCIATED WITH TYPE 2 DIABETES MELLITUS, LIMITED TO BREAKDOWN OF SKIN (H): ICD-10-CM

## 2021-01-20 DIAGNOSIS — E11.42 DIABETIC POLYNEUROPATHY ASSOCIATED WITH TYPE 2 DIABETES MELLITUS (H): ICD-10-CM

## 2021-01-20 DIAGNOSIS — E11.621 DIABETIC ULCER OF RIGHT MIDFOOT ASSOCIATED WITH TYPE 2 DIABETES MELLITUS, LIMITED TO BREAKDOWN OF SKIN (H): Primary | ICD-10-CM

## 2021-01-20 DIAGNOSIS — R20.8 LOSS OF PROTECTIVE SENSATION OF SKIN OF DEFORMED FOOT: ICD-10-CM

## 2021-01-20 DIAGNOSIS — Z89.431 STATUS POST PARTIAL AMPUTATION OF FOOT, RIGHT (H): ICD-10-CM

## 2021-01-20 DIAGNOSIS — E11.9 DIABETES MELLITUS TYPE 2, NONINSULIN DEPENDENT (H): ICD-10-CM

## 2021-01-20 DIAGNOSIS — L60.3 ONYCHODYSTROPHY: ICD-10-CM

## 2021-01-20 DIAGNOSIS — L97.411 DIABETIC ULCER OF RIGHT MIDFOOT ASSOCIATED WITH TYPE 2 DIABETES MELLITUS, LIMITED TO BREAKDOWN OF SKIN (H): ICD-10-CM

## 2021-01-20 DIAGNOSIS — M14.671 CHARCOT'S JOINT OF RIGHT FOOT: ICD-10-CM

## 2021-01-20 DIAGNOSIS — I78.1 TELANGIECTASIAS: ICD-10-CM

## 2021-01-20 PROCEDURE — 97597 DBRDMT OPN WND 1ST 20 CM/<: CPT | Performed by: PODIATRIST

## 2021-01-20 PROCEDURE — 73630 X-RAY EXAM OF FOOT: CPT | Mod: TC,RT,FY

## 2021-01-20 PROCEDURE — 99213 OFFICE O/P EST LOW 20 MIN: CPT | Mod: 25 | Performed by: PODIATRIST

## 2021-01-20 PROCEDURE — 11721 DEBRIDE NAIL 6 OR MORE: CPT | Mod: 59 | Performed by: PODIATRIST

## 2021-01-20 PROCEDURE — G0463 HOSPITAL OUTPT CLINIC VISIT: HCPCS | Mod: 25

## 2021-01-20 SDOH — HEALTH STABILITY: MENTAL HEALTH: HOW OFTEN DO YOU HAVE A DRINK CONTAINING ALCOHOL?: NOT ASKED

## 2021-01-20 SDOH — HEALTH STABILITY: MENTAL HEALTH: HOW MANY STANDARD DRINKS CONTAINING ALCOHOL DO YOU HAVE ON A TYPICAL DAY?: NOT ASKED

## 2021-01-20 SDOH — HEALTH STABILITY: MENTAL HEALTH: HOW OFTEN DO YOU HAVE 6 OR MORE DRINKS ON ONE OCCASION?: NOT ASKED

## 2021-01-20 ASSESSMENT — MIFFLIN-ST. JEOR: SCORE: 1605.25

## 2021-01-20 ASSESSMENT — PAIN SCALES - GENERAL: PAINLEVEL: NO PAIN (0)

## 2021-01-20 NOTE — PROGRESS NOTES
"Chief complaint: Patient presents with:  WOUND CARE: right foot removal of great toe      History of Present Illness: This 85 year old male is seen for follow-up management of his RIGHT foot fracture and concerning changes for Charcot as well as a diabetic foot exam and high risk nail debridement.    Patient normally checks his feet with a mirror after his shower, but he did not check his foot yesterday. He has no pain in his foot.    Patient has been wearing a long leg CAM boot whenever he has been walking to treat his Charcot. He has not been offloading with crutches or a knee scooter. He declined a total contact cast because he said he could not be limited with driving. 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    He has not heard from the orthotist, Jo Ann Carranza, regarding modifications to his inserts (received in September, 2020).    No further pedal complaints today.     Patient does not use tobacco products.     Last HbA1C was 6.2% on 07/22/2020.    Last HbA1C was 6.0% on 01/13/2020.      -----------------------------------------------------    Historically, patient recently had an I&D with a partial first ray amputation of the RIGHT foot on 07/15/2020.        BP (!) 142/74   Pulse 54   Temp 97  F (36.1  C)   Resp 18   Ht 1.753 m (5' 9\")   Wt 93 kg (205 lb)   SpO2 96%   BMI 30.27 kg/m      Patient Active Problem List   Diagnosis     Acute osteomyelitis of right foot (H)     Diabetic foot infection (H)     Osteomyelitis (H)     Abdominal aortic aneurysm (AAA) without rupture (H)     Benign non-nodular prostatic hyperplasia with lower urinary tract symptoms     Diabetic polyneuropathy associated with type 2 diabetes mellitus (H)     Hyperlipidemia associated with type 2 diabetes mellitus (H)     Hypertension associated with diabetes (H)     PAD (peripheral artery disease) (H)     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)     Research exam     Statin " intolerance     Type 2 diabetes mellitus with stage 3 chronic kidney disease, without long-term current use of insulin (H)       Past Surgical History:   Procedure Laterality Date     APPENDECTOMY       EYE SURGERY Right     lens implant     INCISION AND DRAINAGE FOOT, COMBINED Right 7/15/2020    Procedure: Right foot incision and drainage with first ray amputation;  Surgeon: Erin Harris DPM;  Location: [facility]     JOINT REPLACEMENT Right      TONSILLECTOMY         Current Outpatient Medications   Medication     allopurinol (ZYLOPRIM) 100 MG tablet     amoxicillin (AMOXIL) 500 MG capsule     aspirin (ASA) 81 MG chewable tablet     atenolol (TENORMIN) 25 MG tablet     calcium carbonate (OS-FADY 600 MG [place]. [place]) 600 MG tablet     fluocinonide (LIDEX) 0.05 % external ointment     losartan (COZAAR) 25 MG tablet     Magnesium Oxide 500 MG TABS     Multiple Vitamin (MULTIVITAMINS PO)     naproxen sodium (ANAPROX) 220 MG tablet     pravastatin (PRAVACHOL) 10 MG tablet     tamsulosin (FLOMAX) 0.4 MG capsule     No current facility-administered medications for this visit.           Allergies   Allergen Reactions     Flu Virus Vaccine Anaphylaxis     Altace [Ramipril] Other (See Comments)     Dizziness       Ciprofloxacin Diarrhea     Hmg-Coa-R Inhibitors Muscle Pain (Myalgia)     Statin Intolerance Documentation  1. Unable to tolerate at least 2 statins: Crestor at the lowest starting daily dose and Zocor at any daily dose, due to reported symptoms which are temporally related to statin treatment and reversible upon statin discontinuation and other causes have been excluded.     Lisinopril Other (See Comments)     Dizziness       Levaquin [Levofloxacin] Unknown     Sulfa Drugs Unknown     Celebrex [Celecoxib] Rash     Hydrocodone Rash     Niacin Itching       Family History   Problem Relation Age of Onset     Cancer No family hx of        Social History     Socioeconomic History     Marital status:      Spouse  name: Not on file     Number of children: Not on file     Years of education: Not on file     Highest education level: Not on file   Occupational History     Not on file   Social Needs     Financial resource strain: Not on file     Food insecurity     Worry: Not on file     Inability: Not on file     Transportation needs     Medical: Not on file     Non-medical: Not on file   Tobacco Use     Smoking status: Former Smoker     Years: 50.00     Types: Cigarettes     Last attempt to quit: 2001     Years since quittin.5   Substance and Sexual Activity     Alcohol use: No     Drug use: Not on file     Sexual activity: Not on file   Lifestyle     Physical activity     Days per week: Not on file     Minutes per session: Not on file     Stress: Not on file   Relationships     Social connections     Talks on phone: Not on file     Gets together: Not on file     Attends Yazdanism service: Not on file     Active member of club or organization: Not on file     Attends meetings of clubs or organizations: Not on file     Relationship status: Not on file     Intimate partner violence     Fear of current or ex partner: Not on file     Emotionally abused: Not on file     Physically abused: Not on file     Forced sexual activity: Not on file   Other Topics Concern     Parent/sibling w/ CABG, MI or angioplasty before 65F 55M? Not Asked   Social History Narrative     Not on file       ROS: 10 point ROS neg other than the symptoms noted above in the HPI.  EXAM  Constitutional: healthy, alert and no distress    Psychiatric: mentation appears normal and affect normal/bright    RIGHT FOOT FOCUSED    VASCULAR:  -Dorsalis pedis pulse +2/4   -Posterior tibial pulse +1/4   -Capillary refill time < 3 seconds to  hallux  -Hair growth Absent to anterior leg and ankle  -Varicosities and telangiectasias to foot  -Mild 1+ pitting edema to RIGHT foot  NEURO:  -Protective sensation diminished with SWM +1/10 RIGHT and +0/10 LEFT on  11/04/2020  -Light touch sensation diminished to RIGHT plantar forefoot  DERM:  -Skin temperature, texture and turgor WNL b/l  -Toenails elongated, thickened, dystrophic and discolored x 4  -Cicatrix to RIGHT dorsal first ray -- healed with no open wounds    Wound Location:  RIGHT medial plantar midfoot  01/20/2021  Measurement:  2.1cm x 1.4cm x 0.1cm through skin only  Drainage:  Mild-to-moderate serous  Odor:  None  Undermining:  None post debridement  Edges:  Intact  Base:  100% viable  Surrounding Skin: Intact  No severe erythema, no ascending erythema, no calor, no purulence, no malodor, no other SOI.       MSK:  -No Pain to the RIGHT foot  -Mild rocker botton deformity to the plantar lateral foot  -Muscle strength of ankles +5/5 for dorsiflexion, plantarflexion, ABDUction and ADDuction b/l    RIGHT FOOT RADIOGRAPH 11/04/2020                                                                   Impression:  Healing fracture involving the proximal aspect of the second metatarsal with prominent periosteal reaction/callus formation.  UDAY CAROLINA MD    RIGHT FOOT RADIOGRAPH 12/02/2020  IMPRESSION: Stable appearance of the right foot as compared to November 4, 2020  JONO DSOUZA MD    RADIOGRAPH RIGHT FOOT 12/15/2020  IMPRESSION: First metatarsal amputation at the level of the proximal third. There is soft tissue edema. There is some cortication of osteotomy margins suggesting a healing response.   CARLEE SHAY MD    RIGHT FOOT RADIOGRAPH 01/20/2201                                                                   IMPRESSION:   1. Amputation of the great toe at the proximal aspect of the first metatarsal.  2. Patient does have advanced arthritic changes of the tarsometatarsal joints seen on both AP and on the lateral view with areas significant arthritic changes noted. Patient has a significant pes planus. These findings may be attributed to the history of Charcot joint.  3. I identify no erosive changes  and no subcutaneous air. I don't see any definite evidence for osteomyelitis but if this is a strong consideration, I would recommend and MRI with gadolinium.  JENNIFER REID MD  ============================================================    ASSESSMENT:    (E11.621,  L97.411) Diabetic ulcer of right midfoot associated with type 2 diabetes mellitus, limited to breakdown of skin (H)  (primary encounter diagnosis)    (M14.671) Charcot's joint of right foot  (primary encounter diagnosis)    (L60.3) Onychodystrophy  (primary encounter diagnosis)    (I83.11,  I83.12) Varicose veins of both lower extremities with inflammation    (I78.1) Telangiectasias    (E11.9) Diabetes mellitus type 2, noninsulin dependent (H)    (E11.42) Diabetic polyneuropathy associated with type 2 diabetes mellitus (H)    (Z89.431) Status post partial amputation of foot, right (H)    (Z86.31) Personal history of diabetic foot ulcer    (M21.969,  R20.8) Loss of protective sensation of skin of deformed foot      PLAN:  -Patient evaluated and examined. Treatment options discussed with no educational barriers noted.    -Excisional debridement of wound on RIGHT plantar medial midfoot wound with a sharp tissue nipper to subcutaneous tissue (<20 cm squared)  ---Dressed wound with Mepilex Ag and Medipore tape  ---Add'l supplies dispensed to the patient    -Nails debrided x 9 without incident on 01/20/2021    -New radiographs ordered today on 01/20/2021 -- patient's wound is plantar to the new prominence beneath the remainder of the 1st metatarsal base and is plantarflexed toward the ground likely secondary to Charcot changes and / or significant arch collapse. Patient will be called with results. The plan remains for him to monitor the wound daily for any SOI.     ---Patient was called with these results    Offloading:  -Continue CAM boot to partially offload the foot  -Patient advised not to work or to work less or use more offloading devices while at  work  -Patient advised to use a walker or crutches or knee scooter or wheelchair to further offload the RIGHT foot to allow the wound to heal. He has a walker and wheelchair at home.  -Sent a message to the orthotist, Jo Ann Carranza, on 01/20/2021 to see if patient can be scheduled an appointment soon to aggressively offload the new RIGHT plantar foot ulcer and have the insert modified for the new Charcot deformity of the RIGHT foot  -Patient says he can work less hours and / or not work and / or be off his foot more. He is to carefully monitor his wound to determine if he needs more offloading.    -Patient in agreement with the above treatment plan and all of patient's questions were answered.      ---Follow-up in two weeks in [place] for plantar diabetic foot ulcer           Erin Harris DPM

## 2021-01-20 NOTE — NURSING NOTE
"Chief Complaint   Patient presents with     WOUND CARE     right foot removal of great toe       Initial BP (!) 142/74   Pulse 54   Temp 97  F (36.1  C)   Resp 18   Ht 1.753 m (5' 9\")   Wt 93 kg (205 lb)   SpO2 96%   BMI 30.27 kg/m   Estimated body mass index is 30.27 kg/m  as calculated from the following:    Height as of this encounter: 1.753 m (5' 9\").    Weight as of this encounter: 93 kg (205 lb).  Medication Reconciliation: complete  Chio Enamorado LPN  "

## 2021-01-20 NOTE — PATIENT INSTRUCTIONS
-Diabetic Foot Education:  ---Check the feet daily looking for new new blisters or wounds  ---Wear shoes at all times when walking including around the house  ---Avoid lotion application between the toes.   ---If you have any open wounds with signs of infection (redness, swelling, pain, purulence, fever, chills, nausea, vomiting), go to the Emergency Department as soon as possible.    Thank you for allowing  and our Podiatry team to participate in your care. Please call our office at [phone] with scheduling questions or with any other questions or concerns.

## 2021-02-03 ENCOUNTER — OFFICE VISIT (OUTPATIENT)
Dept: PODIATRY | Facility: OTHER | Age: 86
End: 2021-02-03
Attending: PODIATRIST
Payer: MEDICARE

## 2021-02-03 VITALS
TEMPERATURE: 98.3 F | OXYGEN SATURATION: 93 % | RESPIRATION RATE: 12 BRPM | SYSTOLIC BLOOD PRESSURE: 122 MMHG | DIASTOLIC BLOOD PRESSURE: 60 MMHG | WEIGHT: 207 LBS | HEART RATE: 56 BPM | BODY MASS INDEX: 30.57 KG/M2

## 2021-02-03 DIAGNOSIS — L60.3 ONYCHODYSTROPHY: ICD-10-CM

## 2021-02-03 DIAGNOSIS — I83.12 VARICOSE VEINS OF BOTH LOWER EXTREMITIES WITH INFLAMMATION: ICD-10-CM

## 2021-02-03 DIAGNOSIS — Z89.431 STATUS POST PARTIAL AMPUTATION OF FOOT, RIGHT (H): ICD-10-CM

## 2021-02-03 DIAGNOSIS — I83.11 VARICOSE VEINS OF BOTH LOWER EXTREMITIES WITH INFLAMMATION: ICD-10-CM

## 2021-02-03 DIAGNOSIS — E11.621 DIABETIC ULCER OF RIGHT MIDFOOT ASSOCIATED WITH TYPE 2 DIABETES MELLITUS, LIMITED TO BREAKDOWN OF SKIN (H): Primary | ICD-10-CM

## 2021-02-03 DIAGNOSIS — Z86.31 PERSONAL HISTORY OF DIABETIC FOOT ULCER: ICD-10-CM

## 2021-02-03 DIAGNOSIS — M14.671 CHARCOT'S JOINT OF RIGHT FOOT: ICD-10-CM

## 2021-02-03 DIAGNOSIS — I78.1 TELANGIECTASIAS: ICD-10-CM

## 2021-02-03 DIAGNOSIS — N18.31 STAGE 3A CHRONIC KIDNEY DISEASE (H): ICD-10-CM

## 2021-02-03 DIAGNOSIS — R20.8 LOSS OF PROTECTIVE SENSATION OF SKIN OF DEFORMED FOOT: ICD-10-CM

## 2021-02-03 DIAGNOSIS — E11.9 DIABETES MELLITUS TYPE 2, NONINSULIN DEPENDENT (H): ICD-10-CM

## 2021-02-03 DIAGNOSIS — E11.42 DIABETIC POLYNEUROPATHY ASSOCIATED WITH TYPE 2 DIABETES MELLITUS (H): ICD-10-CM

## 2021-02-03 DIAGNOSIS — M21.969 LOSS OF PROTECTIVE SENSATION OF SKIN OF DEFORMED FOOT: ICD-10-CM

## 2021-02-03 DIAGNOSIS — L97.411 DIABETIC ULCER OF RIGHT MIDFOOT ASSOCIATED WITH TYPE 2 DIABETES MELLITUS, LIMITED TO BREAKDOWN OF SKIN (H): Primary | ICD-10-CM

## 2021-02-03 PROCEDURE — 11042 DBRDMT SUBQ TIS 1ST 20SQCM/<: CPT | Performed by: PODIATRIST

## 2021-02-03 PROCEDURE — G0463 HOSPITAL OUTPT CLINIC VISIT: HCPCS | Mod: 25

## 2021-02-03 ASSESSMENT — PAIN SCALES - GENERAL: PAINLEVEL: NO PAIN (0)

## 2021-02-03 NOTE — PATIENT INSTRUCTIONS
Thank you for allowing  and our Podiatry team to participate in your care. Please call our office at [phone] with scheduling questions or with any other questions or concerns.

## 2021-02-03 NOTE — NURSING NOTE
"Chief Complaint   Patient presents with     WOUND CARE       Initial /60 (BP Location: Right arm, Patient Position: Sitting, Cuff Size: Adult Regular)   Pulse 56   Temp 98.3  F (36.8  C) (Tympanic)   Resp 12   Wt 93.9 kg (207 lb)   SpO2 93%   BMI 30.57 kg/m   Estimated body mass index is 30.57 kg/m  as calculated from the following:    Height as of 1/20/21: 1.753 m (5' 9\").    Weight as of this encounter: 93.9 kg (207 lb).  Medication Reconciliation: complete  Janet Martin LPN  "

## 2021-02-03 NOTE — PROGRESS NOTES
Chief complaint: Patient presents with:  WOUND CARE      History of Present Illness: This 85 year old male is seen for follow-up management of his RIGHT plantar diabetic foot ulcer. The ulcer was first noticed at his last podiatry appointment on 01/20/2021. He has been consistently wearing a CAM boot. He is also wearing an insert in the CAM walker boot that was modified by the orthotist, Jo Ann Carranza. He will be getting new offloading DM shoes and inserts within a couple of weeks and he was told that Jo Ann would call him when the shoes came in. He is hoping to discontinue the CAM boot soon.    Patient has been changing his wound dressing daily with a Mepilex Ag and tape. He checks his foot daily for SOI.    No further pedal complaints today.     Patient does not use tobacco products.     Last HbA1C was 6.0% on 01/25/2021.    ---Previously 6.2% on 07/22/2020.    ---Previously 6.0% on 01/13/2020.      -----------------------------------------------------    Historically, patient recently had an I&D with a partial first ray amputation of the RIGHT foot on 07/15/2020.        /60 (BP Location: Right arm, Patient Position: Sitting, Cuff Size: Adult Regular)   Pulse 56   Temp 98.3  F (36.8  C) (Tympanic)   Resp 12   Wt 93.9 kg (207 lb)   SpO2 93%   BMI 30.57 kg/m      Patient Active Problem List   Diagnosis     Acute osteomyelitis of right foot (H)     Diabetic foot infection (H)     Osteomyelitis (H)     Abdominal aortic aneurysm (AAA) without rupture (H)     Benign non-nodular prostatic hyperplasia with lower urinary tract symptoms     Diabetic polyneuropathy associated with type 2 diabetes mellitus (H)     Hyperlipidemia associated with type 2 diabetes mellitus (H)     Hypertension associated with diabetes (H)     PAD (peripheral artery disease) (H)     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)     Research exam     Statin intolerance     Type 2 diabetes mellitus with stage 3 chronic kidney disease, without  long-term current use of insulin (H)       Past Surgical History:   Procedure Laterality Date     APPENDECTOMY       EYE SURGERY Right     lens implant     INCISION AND DRAINAGE FOOT, COMBINED Right 7/15/2020    Procedure: Right foot incision and drainage with first ray amputation;  Surgeon: Erin Harris DPM;  Location: [facility]     JOINT REPLACEMENT Right      TONSILLECTOMY         Current Outpatient Medications   Medication     allopurinol (ZYLOPRIM) 100 MG tablet     amoxicillin (AMOXIL) 500 MG capsule     aspirin (ASA) 81 MG chewable tablet     atenolol (TENORMIN) 25 MG tablet     calcium carbonate (OS-FADY 600 MG [place]. [place]) 600 MG tablet     fluocinonide (LIDEX) 0.05 % external ointment     losartan (COZAAR) 25 MG tablet     Magnesium Oxide 500 MG TABS     Multiple Vitamin (MULTIVITAMINS PO)     naproxen sodium (ANAPROX) 220 MG tablet     pravastatin (PRAVACHOL) 10 MG tablet     tamsulosin (FLOMAX) 0.4 MG capsule     No current facility-administered medications for this visit.           Allergies   Allergen Reactions     Flu Virus Vaccine Anaphylaxis     Altace [Ramipril] Other (See Comments)     Dizziness       Ciprofloxacin Diarrhea     Hmg-Coa-R Inhibitors Muscle Pain (Myalgia)     Statin Intolerance Documentation  1. Unable to tolerate at least 2 statins: Crestor at the lowest starting daily dose and Zocor at any daily dose, due to reported symptoms which are temporally related to statin treatment and reversible upon statin discontinuation and other causes have been excluded.     Lisinopril Other (See Comments)     Dizziness       Levaquin [Levofloxacin] Unknown     Sulfa Drugs Unknown     Celebrex [Celecoxib] Rash     Hydrocodone Rash     Niacin Itching       Family History   Problem Relation Age of Onset     Cancer No family hx of        Social History     Socioeconomic History     Marital status:      Spouse name: Not on file     Number of children: Not on file     Years of education: Not on  file     Highest education level: Not on file   Occupational History     Not on file   Social Needs     Financial resource strain: Not on file     Food insecurity     Worry: Not on file     Inability: Not on file     Transportation needs     Medical: Not on file     Non-medical: Not on file   Tobacco Use     Smoking status: Former Smoker     Years: 50.00     Types: Cigarettes     Last attempt to quit: 2001     Years since quittin.5   Substance and Sexual Activity     Alcohol use: No     Drug use: Not on file     Sexual activity: Not on file   Lifestyle     Physical activity     Days per week: Not on file     Minutes per session: Not on file     Stress: Not on file   Relationships     Social connections     Talks on phone: Not on file     Gets together: Not on file     Attends Jainism service: Not on file     Active member of club or organization: Not on file     Attends meetings of clubs or organizations: Not on file     Relationship status: Not on file     Intimate partner violence     Fear of current or ex partner: Not on file     Emotionally abused: Not on file     Physically abused: Not on file     Forced sexual activity: Not on file   Other Topics Concern     Parent/sibling w/ CABG, MI or angioplasty before 65F 55M? Not Asked   Social History Narrative     Not on file       ROS: 10 point ROS neg other than the symptoms noted above in the HPI.  EXAM  Constitutional: healthy, alert and no distress    Psychiatric: mentation appears normal and affect normal/bright    RIGHT FOOT FOCUSED    VASCULAR:  -Dorsalis pedis pulse +2/4   -Posterior tibial pulse +1/4   -Capillary refill time < 3 seconds to  hallux  -Hair growth Absent to anterior leg and ankle  -Varicosities and telangiectasias to foot  -Mild 1+ pitting edema to RIGHT foot  NEURO:  -Protective sensation diminished with SWM +1/10 RIGHT and +0/10 LEFT on 2020  -Light touch sensation diminished to RIGHT plantar forefoot  DERM:  -Skin  temperature, texture and turgor WNL b/l  -Toenails elongated, thickened, dystrophic and discolored x 4  -Cicatrix to RIGHT dorsal first ray -- healed with no open wounds    Wound Location:  RIGHT medial plantar midfoot  02/03/2021  Measurement:  0.4cm x 0.4cm x 0.1cm through subcutaneous tissue  Drainage:  Mild-to-moderate serous  Odor:  None  Undermining:  None post debridement  Edges:  Intact  Base:  100% viable  Surrounding Skin: Intact  No severe erythema, no ascending erythema, no calor, no purulence, no malodor, no other SOI.     01/20/2021  Measurement:  2.1cm x 1.4cm x 0.1cm through skin only  Drainage:  Mild-to-moderate serous  Odor:  None  Undermining:  None post debridement  Edges:  Intact  Base:  100% viable  Surrounding Skin: Intact  No severe erythema, no ascending erythema, no calor, no purulence, no malodor, no other SOI.       MSK:  -No Pain to the RIGHT foot  -Mild rocker botton deformity to the plantar lateral foot  -Muscle strength of ankles +5/5 for dorsiflexion, plantarflexion, ABDUction and ADDuction b/l    RIGHT FOOT RADIOGRAPH 11/04/2020                                                                   Impression:  Healing fracture involving the proximal aspect of the second metatarsal with prominent periosteal reaction/callus formation.  UDAY CAROLINA MD    RIGHT FOOT RADIOGRAPH 12/02/2020  IMPRESSION: Stable appearance of the right foot as compared to November 4, 2020  JONO DSOUZA MD    RADIOGRAPH RIGHT FOOT 12/15/2020  IMPRESSION: First metatarsal amputation at the level of the proximal third. There is soft tissue edema. There is some cortication of osteotomy margins suggesting a healing response.   CARLEE SHAY MD    RIGHT FOOT RADIOGRAPH 01/20/2201                                                                   IMPRESSION:   1. Amputation of the great toe at the proximal aspect of the first metatarsal.  2. Patient does have advanced arthritic changes of the  tarsometatarsal joints seen on both AP and on the lateral view with areas significant arthritic changes noted. Patient has a significant pes planus. These findings may be attributed to the history of Charcot joint.  3. I identify no erosive changes and no subcutaneous air. I don't see any definite evidence for osteomyelitis but if this is a strong consideration, I would recommend and MRI with gadolinium.  JENNIFER REID MD  ============================================================    ASSESSMENT:    (E11.621,  L97.411) Diabetic ulcer of right midfoot associated with type 2 diabetes mellitus, limited to breakdown of skin (H)  (primary encounter diagnosis)    (M14.671) Charcot's joint of right foot  (primary encounter diagnosis)    (L60.3) Onychodystrophy  (primary encounter diagnosis)    (I83.11,  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 Treatment options discussed with no educational barriers noted.    -Excisional debridement of wound on RIGHT plantar medial midfoot wound with a sharp tissue nipper to subcutaneous tissue (<20 cm squared)  ---Dressed wound with triple antibiotic cream, gauze and Medipore tape  ---Patient has Mepilex Ag and he should resume Mepilex Ag and Medipore tape with daily dressing changes (starting tomorrow on 02/04/2021)    -Nails last debrided on 01/20/2021  -Most recent radiographs from 01/20/2021 -- patient's wound is plantar to the new prominence beneath the remainder of the 1st metatarsal base and is plantarflexed  toward the ground likely secondary to Charcot changes and / or significant arch collapse. Patient will be called with results. The plan remains for him to monitor the wound daily for any SOI.     Offloading:  -Continue CAM boot to partially offload the foot. Patient wears an offloaded insert (offloaded from the orthotist, Jo Ann Carranza in January, 2021) in his CAM boot.  -Patient was previously advised not to work or to work less or use more offloading devices while at work  -Patient was previously advised to use a walker or crutches or knee scooter or wheelchair to further offload the RIGHT foot to allow the wound to heal. He has a walker and wheelchair at home.  -Sent a message to the orthotist, Jo Ann Carranza, on 02/04/2021 to have the orthotist let podiatry know when the patient's new shoes and offloading inserts are ready to be picked up by the patient so his appointment can be coordinated with podiatry.    -Patient in agreement with the above treatment plan and all of patient's questions were answered.      -Follow-up in two weeks in [facility] for plantar diabetic foot ulcer   ---Will change this appointment to [place] to coordinate with the orthotist, Jo Ann Carranza, if the patient's shoes come in before his follow-up appointment date in [facility] on 02/17/2021      Erin Harris DPM

## 2021-02-04 PROBLEM — M86.171 ACUTE OSTEOMYELITIS OF RIGHT FOOT (H): Status: RESOLVED | Noted: 2020-07-14 | Resolved: 2021-02-04

## 2021-02-04 PROBLEM — M86.9 OSTEOMYELITIS (H): Status: RESOLVED | Noted: 2020-07-15 | Resolved: 2021-02-04

## 2021-02-17 ENCOUNTER — OFFICE VISIT (OUTPATIENT)
Dept: PODIATRY | Facility: OTHER | Age: 86
End: 2021-02-17
Attending: PODIATRIST
Payer: MEDICARE

## 2021-02-17 VITALS
BODY MASS INDEX: 31.1 KG/M2 | TEMPERATURE: 96.3 F | HEIGHT: 69 IN | HEART RATE: 54 BPM | RESPIRATION RATE: 12 BRPM | WEIGHT: 210 LBS | OXYGEN SATURATION: 97 % | DIASTOLIC BLOOD PRESSURE: 68 MMHG | SYSTOLIC BLOOD PRESSURE: 128 MMHG

## 2021-02-17 DIAGNOSIS — Z89.431 STATUS POST PARTIAL AMPUTATION OF FOOT, RIGHT (H): ICD-10-CM

## 2021-02-17 DIAGNOSIS — M14.671 CHARCOT'S JOINT OF RIGHT FOOT: ICD-10-CM

## 2021-02-17 DIAGNOSIS — E11.42 DIABETIC POLYNEUROPATHY ASSOCIATED WITH TYPE 2 DIABETES MELLITUS (H): ICD-10-CM

## 2021-02-17 DIAGNOSIS — R20.8 LOSS OF PROTECTIVE SENSATION OF SKIN OF DEFORMED FOOT: ICD-10-CM

## 2021-02-17 DIAGNOSIS — I78.1 TELANGIECTASIAS: ICD-10-CM

## 2021-02-17 DIAGNOSIS — I83.11 VARICOSE VEINS OF BOTH LOWER EXTREMITIES WITH INFLAMMATION: ICD-10-CM

## 2021-02-17 DIAGNOSIS — L60.3 ONYCHODYSTROPHY: ICD-10-CM

## 2021-02-17 DIAGNOSIS — M21.969 LOSS OF PROTECTIVE SENSATION OF SKIN OF DEFORMED FOOT: ICD-10-CM

## 2021-02-17 DIAGNOSIS — L97.411 DIABETIC ULCER OF RIGHT MIDFOOT ASSOCIATED WITH TYPE 2 DIABETES MELLITUS, LIMITED TO BREAKDOWN OF SKIN (H): Primary | ICD-10-CM

## 2021-02-17 DIAGNOSIS — E11.621 DIABETIC ULCER OF RIGHT MIDFOOT ASSOCIATED WITH TYPE 2 DIABETES MELLITUS, LIMITED TO BREAKDOWN OF SKIN (H): Primary | ICD-10-CM

## 2021-02-17 DIAGNOSIS — Z86.31 PERSONAL HISTORY OF DIABETIC FOOT ULCER: ICD-10-CM

## 2021-02-17 DIAGNOSIS — I83.12 VARICOSE VEINS OF BOTH LOWER EXTREMITIES WITH INFLAMMATION: ICD-10-CM

## 2021-02-17 DIAGNOSIS — N18.31 STAGE 3A CHRONIC KIDNEY DISEASE (H): ICD-10-CM

## 2021-02-17 DIAGNOSIS — E11.9 DIABETES MELLITUS TYPE 2, NONINSULIN DEPENDENT (H): ICD-10-CM

## 2021-02-17 PROCEDURE — 97597 DBRDMT OPN WND 1ST 20 CM/<: CPT | Performed by: PODIATRIST

## 2021-02-17 ASSESSMENT — PAIN SCALES - GENERAL: PAINLEVEL: NO PAIN (0)

## 2021-02-17 ASSESSMENT — MIFFLIN-ST. JEOR: SCORE: 1627.93

## 2021-02-17 NOTE — PROGRESS NOTES
"Chief complaint: Patient presents with:  WOUND CARE  Toenail: trimming      History of Present Illness: This 85 year old male is seen for follow-up management of his RIGHT plantar diabetic foot ulcer. The ulcer was first noticed at his last podiatry appointment on 01/20/2021. He has been consistently wearing a CAM boot.     Patient was fit for new offloading DM shoes yesterday on 02/16/2021 by the orthotist, Jo Ann Carranza, but she was unable to fully offload the ulcerative area of the RIGHT foot because she didn't have equipment at the [facility] location. The patient will see Jo Ann again tomorrow on 02/18/2021 in [place] to have the shoe further modified.    Patient is wearing a stocking on his RIGHT leg (not a compression sock but a long, knee high white sock) and he is wondering if he can get another one. He thinks he received the previous pair from [facility] at the [facility] as requested through [facility].    Patient has been changing his wound dressing daily with a Mepilex Ag and tape. He checks his foot daily for SOI.    No further pedal complaints today.     Patient does not use tobacco products.     Last HbA1C was 6.0% on 01/25/2021.    ---Previously 6.2% on 07/22/2020.    ---Previously 6.0% on 01/13/2020.      -----------------------------------------------------    Historically, patient recently had an I&D with a partial first ray amputation of the RIGHT foot on 07/15/2020.        /68 (BP Location: Right arm, Patient Position: Sitting, Cuff Size: Adult Regular)   Pulse 54   Temp 96.3  F (35.7  C) (Tympanic)   Resp 12   Ht 1.753 m (5' 9\")   Wt 95.3 kg (210 lb)   SpO2 97%   BMI 31.01 kg/m      Patient Active Problem List   Diagnosis     Diabetic foot infection (H)     Abdominal aortic aneurysm (AAA) without rupture (H)     Benign non-nodular prostatic hyperplasia with lower urinary tract symptoms     Diabetic polyneuropathy associated with type 2 diabetes mellitus (H) "     Hyperlipidemia associated with type 2 diabetes mellitus (H)     Hypertension associated with diabetes (H)     PAD (peripheral artery disease) (H)     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)     Research exam     Statin intolerance     Type 2 diabetes mellitus with stage 3 chronic kidney disease, without long-term current use of insulin (H)       Past Surgical History:   Procedure Laterality Date     APPENDECTOMY       EYE SURGERY Right     lens implant     INCISION AND DRAINAGE FOOT, COMBINED Right 7/15/2020    Procedure: Right foot incision and drainage with first ray amputation;  Surgeon: Erin Harris DPM;  Location: [facility]     JOINT REPLACEMENT Right      TONSILLECTOMY         Current Outpatient Medications   Medication     allopurinol (ZYLOPRIM) 100 MG tablet     amoxicillin (AMOXIL) 500 MG capsule     aspirin (ASA) 81 MG chewable tablet     atenolol (TENORMIN) 25 MG tablet     calcium carbonate (OS-FADY 600 MG [place]. [place]) 600 MG tablet     fluocinonide (LIDEX) 0.05 % external ointment     losartan (COZAAR) 25 MG tablet     Magnesium Oxide 500 MG TABS     Multiple Vitamin (MULTIVITAMINS PO)     naproxen sodium (ANAPROX) 220 MG tablet     pravastatin (PRAVACHOL) 10 MG tablet     tamsulosin (FLOMAX) 0.4 MG capsule     No current facility-administered medications for this visit.           Allergies   Allergen Reactions     Flu Virus Vaccine Anaphylaxis     Altace [Ramipril] Other (See Comments)     Dizziness       Ciprofloxacin Diarrhea     Hmg-Coa-R Inhibitors Muscle Pain (Myalgia)     Statin Intolerance Documentation  1. Unable to tolerate at least 2 statins: Crestor at the lowest starting daily dose and Zocor at any daily dose, due to reported symptoms which are temporally related to statin treatment and reversible upon statin discontinuation and other causes have been excluded.     Lisinopril Other (See Comments)     Dizziness       Levaquin [Levofloxacin] Unknown     Sulfa Drugs Unknown     Celebrex  [Celecoxib] Rash     Hydrocodone Rash     Niacin Itching       Family History   Problem Relation Age of Onset     Cancer No family hx of        Social History     Socioeconomic History     Marital status:      Spouse name: Not on file     Number of children: Not on file     Years of education: Not on file     Highest education level: Not on file   Occupational History     Not on file   Social Needs     Financial resource strain: Not on file     Food insecurity     Worry: Not on file     Inability: Not on file     Transportation needs     Medical: Not on file     Non-medical: Not on file   Tobacco Use     Smoking status: Former Smoker     Years: 50.00     Types: Cigarettes     Last attempt to quit: 2001     Years since quittin.5   Substance and Sexual Activity     Alcohol use: No     Drug use: Not on file     Sexual activity: Not on file   Lifestyle     Physical activity     Days per week: Not on file     Minutes per session: Not on file     Stress: Not on file   Relationships     Social connections     Talks on phone: Not on file     Gets together: Not on file     Attends [employer] service: Not on file     Active member of club or organization: Not on file     Attends meetings of clubs or organizations: Not on file     Relationship status: Not on file     Intimate partner violence     Fear of current or ex partner: Not on file     Emotionally abused: Not on file     Physically abused: Not on file     Forced sexual activity: Not on file   Other Topics Concern     Parent/sibling w/ CABG, MI or angioplasty before 65F 55M? Not Asked   Social History Narrative     Not on file       ROS: 10 point ROS neg other than the symptoms noted above in the HPI.  EXAM  Constitutional: healthy, alert and no distress    Psychiatric: mentation appears normal and affect normal/bright    RIGHT FOOT FOCUSED    VASCULAR:  -Dorsalis pedis pulse +2/4   -Posterior tibial pulse +1/4   -Capillary refill time < 3 seconds to   hallux  -Hair growth Absent to anterior leg and ankle  -Varicosities and telangiectasias to foot  -Mild 1+ pitting edema to RIGHT foot  NEURO:  -Protective sensation diminished with SWM +1/10 RIGHT and +0/10 LEFT on 11/04/2020  -Light touch sensation diminished to RIGHT plantar forefoot  DERM:  -Skin temperature, texture and turgor WNL b/l  -Toenails elongated, thickened, dystrophic and discolored x 4  -Cicatrix to RIGHT dorsal first ray -- healed with no open wounds    Wound Location:  RIGHT medial plantar midfoot  02/17/2021  Measurement:  0.2cm x 0.4cm x 0.1cm through the skin only  Drainage:  Minimal serous  Odor:  None  Undermining:  None post debridement  Edges:  Intact  Base:  100% viable  Surrounding Skin: Intact  No severe erythema, no ascending erythema, no calor, no purulence, no malodor, no other SOI.     02/03/2021  Measurement:  0.4cm x 0.4cm x 0.1cm through subcutaneous tissue  Drainage:  Mild-to-moderate serous  Odor:  None  Undermining:  None post debridement  Edges:  Intact  Base:  100% viable  Surrounding Skin: Intact  No severe erythema, no ascending erythema, no calor, no purulence, no malodor, no other SOI.     01/20/2021:  2.1cm x 1.4cm x 0.1cm through skin only    MSK:  -No Pain to the RIGHT foot  -Mild rocker botton deformity to the plantar lateral foot  -Muscle strength of ankles +5/5 for dorsiflexion, plantarflexion, ABDUction and ADDuction b/l    RIGHT FOOT RADIOGRAPH 11/04/2020                                                                   Impression:  Healing fracture involving the proximal aspect of the second metatarsal with prominent periosteal reaction/callus formation.  UDAY CAROLINA MD    RIGHT FOOT RADIOGRAPH 12/02/2020  IMPRESSION: Stable appearance of the right foot as compared to November 4, 2020  JONO DSOUZA MD    RADIOGRAPH RIGHT FOOT 12/15/2020  IMPRESSION: First metatarsal amputation at the level of the proximal third. There is soft tissue edema. There is some  cortication of osteotomy margins suggesting a healing response.   CARLEE SHAY MD    RIGHT FOOT RADIOGRAPH 01/20/2201                                                                   IMPRESSION:   1. Amputation of the great toe at the proximal aspect of the first metatarsal.  2. Patient does have advanced arthritic changes of the tarsometatarsal joints seen on both AP and on the lateral view with areas significant arthritic changes noted. Patient has a significant pes planus. These findings may be attributed to the history of Charcot joint.  3. I identify no erosive changes and no subcutaneous air. I don't see any definite evidence for osteomyelitis but if this is a strong consideration, I would recommend and MRI with gadolinium.  JENINFER REID MD  ============================================================    ASSESSMENT:    (E11.621,  L97.411) Diabetic ulcer of right midfoot associated with type 2 diabetes mellitus, limited to breakdown of skin (H)  (primary encounter diagnosis)    (M14.671) Charcot's joint of right foot  (primary encounter diagnosis)    (L60.3) Onychodystrophy  (primary encounter diagnosis)    (I83.11,  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 Treatment options discussed with no educational barriers noted.    -Excisional debridement of wound on RIGHT plantar medial midfoot wound with a sharp tissue nipper through the skin only (<20 cm squared)  ---Dressed wound with triple antibiotic cream, gauze and Medipore  tape  ---Patient has Mepilex Ag and he should resume Mepilex Ag and Medipore tape with daily dressing changes (starting tomorrow on 02/18/2021)    -Nails last debrided on 01/20/2021  -Most recent radiographs from 01/20/2021 -- patient's wound is plantar to the new prominence beneath the remainder of the 1st metatarsal base and is plantarflexed toward the ground likely secondary to Charcot changes and / or significant arch collapse. Patient will be called with results. The plan remains for him to monitor the wound daily for any SOI.     Discussed Offloading Plan:  -Continue CAM boot to partially offload the foot. Patient wears an offloaded insert (offloaded from the orthotist, Jo Ann Carranza in January, 2021) in his CAM boot. He is advised to continue wearing this until the wound is fully healed.  -Patient has a newly offloaded shoe that will be even further offloaded on 02/18/2021 by the orthotist, Jo Ann Carranza.    -Sent a message to the orthotist, Jo Ann Carranza, on 02/17/2021, to see if Jo Ann has an extra long (knee high) sock stocking similar the one the patient has.    -Patient in agreement with the above treatment plan and all of patient's questions were answered.      -Follow-up in two weeks in [facility] for plantar diabetic foot ulcer   ---Will change this appointment to [place] to coordinate with the orthotist, Jo Ann Carranza, if the patient's shoes come in before his follow-up appointment date in [facility] on 02/17/2021      Erin Harris DPM

## 2021-02-17 NOTE — NURSING NOTE
"Chief Complaint   Patient presents with     WOUND CARE     Toenail     trimming       Initial /68 (BP Location: Right arm, Patient Position: Sitting, Cuff Size: Adult Regular)   Pulse 54   Temp 96.3  F (35.7  C) (Tympanic)   Resp 12   Ht 1.753 m (5' 9\")   Wt 95.3 kg (210 lb)   SpO2 97%   BMI 31.01 kg/m   Estimated body mass index is 31.01 kg/m  as calculated from the following:    Height as of this encounter: 1.753 m (5' 9\").    Weight as of this encounter: 95.3 kg (210 lb).  Medication Reconciliation: complete  Janet Martin LPN  "

## 2021-02-17 NOTE — PATIENT INSTRUCTIONS
-Diabetic Foot Education:  ---Check the feet daily looking for new new blisters or wounds  ---Wear shoes at all times when walking including around the house  ---Avoid lotion application between the toes.   ---If you have any open wounds with signs of infection (redness, swelling, pain, purulence, fever, chills, nausea, vomiting), go to the Emergency Department as soon as possible.    Thank you for allowing  and our Podiatry team to participate in your care. Please call our office at [phone] with scheduling questions or with any other questions or concerns.

## 2021-03-03 ENCOUNTER — OFFICE VISIT (OUTPATIENT)
Dept: PODIATRY | Facility: OTHER | Age: 86
End: 2021-03-03
Attending: PODIATRIST
Payer: MEDICARE

## 2021-03-03 VITALS
OXYGEN SATURATION: 94 % | SYSTOLIC BLOOD PRESSURE: 124 MMHG | DIASTOLIC BLOOD PRESSURE: 80 MMHG | BODY MASS INDEX: 30.27 KG/M2 | TEMPERATURE: 97.2 F | HEART RATE: 58 BPM | WEIGHT: 205 LBS

## 2021-03-03 DIAGNOSIS — I83.11 VARICOSE VEINS OF BOTH LOWER EXTREMITIES WITH INFLAMMATION: ICD-10-CM

## 2021-03-03 DIAGNOSIS — Z86.31 PERSONAL HISTORY OF DIABETIC FOOT ULCER: ICD-10-CM

## 2021-03-03 DIAGNOSIS — E11.9 DIABETES MELLITUS TYPE 2, NONINSULIN DEPENDENT (H): ICD-10-CM

## 2021-03-03 DIAGNOSIS — M21.969 LOSS OF PROTECTIVE SENSATION OF SKIN OF DEFORMED FOOT: ICD-10-CM

## 2021-03-03 DIAGNOSIS — E11.42 DIABETIC POLYNEUROPATHY ASSOCIATED WITH TYPE 2 DIABETES MELLITUS (H): ICD-10-CM

## 2021-03-03 DIAGNOSIS — I83.12 VARICOSE VEINS OF BOTH LOWER EXTREMITIES WITH INFLAMMATION: ICD-10-CM

## 2021-03-03 DIAGNOSIS — I78.1 TELANGIECTASIAS: ICD-10-CM

## 2021-03-03 DIAGNOSIS — Z89.431 STATUS POST PARTIAL AMPUTATION OF FOOT, RIGHT (H): ICD-10-CM

## 2021-03-03 DIAGNOSIS — L97.412 DIABETIC ULCER OF RIGHT MIDFOOT ASSOCIATED WITH TYPE 2 DIABETES MELLITUS, WITH FAT LAYER EXPOSED (H): Primary | ICD-10-CM

## 2021-03-03 DIAGNOSIS — L60.3 ONYCHODYSTROPHY: ICD-10-CM

## 2021-03-03 DIAGNOSIS — N18.31 STAGE 3A CHRONIC KIDNEY DISEASE (H): ICD-10-CM

## 2021-03-03 DIAGNOSIS — M14.671 CHARCOT'S JOINT OF RIGHT FOOT: ICD-10-CM

## 2021-03-03 DIAGNOSIS — E11.621 DIABETIC ULCER OF RIGHT MIDFOOT ASSOCIATED WITH TYPE 2 DIABETES MELLITUS, WITH FAT LAYER EXPOSED (H): Primary | ICD-10-CM

## 2021-03-03 DIAGNOSIS — R20.8 LOSS OF PROTECTIVE SENSATION OF SKIN OF DEFORMED FOOT: ICD-10-CM

## 2021-03-03 PROCEDURE — 11042 DBRDMT SUBQ TIS 1ST 20SQCM/<: CPT | Performed by: PODIATRIST

## 2021-03-03 PROCEDURE — G0463 HOSPITAL OUTPT CLINIC VISIT: HCPCS

## 2021-03-03 PROCEDURE — 11045 DBRDMT SUBQ TISS EACH ADDL: CPT | Performed by: PODIATRIST

## 2021-03-03 ASSESSMENT — PAIN SCALES - GENERAL: PAINLEVEL: NO PAIN (0)

## 2021-03-03 NOTE — PROGRESS NOTES
Chief complaint: Patient presents with:  RECHECK: Right Foot Ulcer        History of Present Illness: This 85 year old male is seen for follow-up management of his RIGHT plantar diabetic foot ulcer. The ulcer was first noticed at his last podiatry appointment on 01/20/2021. He has been consistently wearing a CAM boot.     Patient was fit for new offloading DM shoes on 02/16/2021 by the orthotist, Jo Ann Carranza, and they were further offloaded by Jo Ann strong [place] on 02/18/2021. He is wondering if he can start wearing the shoes since he has been in the boot for so long.      Patient has been changing his wound dressing daily with a Mepilex Ag and tape. He checks his foot daily for SOI. He says he has a callus on his foot but he has not noticed any drainage.     No further pedal complaints today.     Patient does not use tobacco products.     Last HbA1C was 6.0% on 01/25/2021.    ---Previously 6.2% on 07/22/2020.    ---Previously 6.0% on 01/13/2020.      -----------------------------------------------------    Historically, patient recently had an I&D with a partial first ray amputation of the RIGHT foot on 07/15/2020.        /80 (BP Location: Left arm)   Pulse 58   Temp 97.2  F (36.2  C) (Tympanic)   Wt 93 kg (205 lb)   SpO2 94%   BMI 30.27 kg/m      Patient Active Problem List   Diagnosis     Diabetic foot infection (H)     Abdominal aortic aneurysm (AAA) without rupture (H)     Benign non-nodular prostatic hyperplasia with lower urinary tract symptoms     Diabetic polyneuropathy associated with type 2 diabetes mellitus (H)     Hyperlipidemia associated with type 2 diabetes mellitus (H)     Hypertension associated with diabetes (H)     PAD (peripheral artery disease) (H)     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)     Research exam     Statin intolerance     Type 2 diabetes mellitus with stage 3 chronic kidney disease, without long-term current use of insulin (H)       Past Surgical History:   Procedure  Laterality Date     APPENDECTOMY       EYE SURGERY Right     lens implant     INCISION AND DRAINAGE FOOT, COMBINED Right 7/15/2020    Procedure: Right foot incision and drainage with first ray amputation;  Surgeon: Erin Harris DPM;  Location: [facility]     JOINT REPLACEMENT Right      TONSILLECTOMY         Current Outpatient Medications   Medication     allopurinol (ZYLOPRIM) 100 MG tablet     amoxicillin (AMOXIL) 500 MG capsule     aspirin (ASA) 81 MG chewable tablet     atenolol (TENORMIN) 25 MG tablet     calcium carbonate (OS-FADY 600 MG [place]. CA) 600 MG tablet     fluocinonide (LIDEX) 0.05 % external ointment     losartan (COZAAR) 25 MG tablet     Magnesium Oxide 500 MG TABS     Multiple Vitamin (MULTIVITAMINS PO)     naproxen sodium (ANAPROX) 220 MG tablet     pravastatin (PRAVACHOL) 10 MG tablet     tamsulosin (FLOMAX) 0.4 MG capsule     No current facility-administered medications for this visit.           Allergies   Allergen Reactions     Flu Virus Vaccine Anaphylaxis     Altace [Ramipril] Other (See Comments)     Dizziness       Ciprofloxacin Diarrhea     Hmg-Coa-R Inhibitors Muscle Pain (Myalgia)     Statin Intolerance Documentation  1. Unable to tolerate at least 2 statins: Crestor at the lowest starting daily dose and Zocor at any daily dose, due to reported symptoms which are temporally related to statin treatment and reversible upon statin discontinuation and other causes have been excluded.     Lisinopril Other (See Comments)     Dizziness       Levaquin [Levofloxacin] Unknown     Sulfa Drugs Unknown     Celebrex [Celecoxib] Rash     Hydrocodone Rash     Niacin Itching       Family History   Problem Relation Age of Onset     Cancer No family hx of        Social History     Socioeconomic History     Marital status:      Spouse name: Not on file     Number of children: Not on file     Years of education: Not on file     Highest education level: Not on file   Occupational History     Not on  file   Social Needs     Financial resource strain: Not on file     Food insecurity     Worry: Not on file     Inability: Not on file     Transportation needs     Medical: Not on file     Non-medical: Not on file   Tobacco Use     Smoking status: Former Smoker     Years: 50.00     Types: Cigarettes     Last attempt to quit: 2001     Years since quittin.5   Substance and Sexual Activity     Alcohol use: No     Drug use: Not on file     Sexual activity: Not on file   Lifestyle     Physical activity     Days per week: Not on file     Minutes per session: Not on file     Stress: Not on file   Relationships     Social connections     Talks on phone: Not on file     Gets together: Not on file     Attends Mosque service: Not on file     Active member of club or organization: Not on file     Attends meetings of clubs or organizations: Not on file     Relationship status: Not on file     Intimate partner violence     Fear of current or ex partner: Not on file     Emotionally abused: Not on file     Physically abused: Not on file     Forced sexual activity: Not on file   Other Topics Concern     Parent/sibling w/ CABG, MI or angioplasty before 65F 55M? Not Asked   Social History Narrative     Not on file       ROS: 10 point ROS neg other than the symptoms noted above in the HPI.  EXAM  Constitutional: healthy, alert and no distress    Psychiatric: mentation appears normal and affect normal/bright    RIGHT FOOT FOCUSED    VASCULAR:  -Dorsalis pedis pulse +2/4   -Posterior tibial pulse +1/4   -Capillary refill time < 3 seconds to  hallux  -Hair growth Absent to anterior leg and ankle  -Varicosities and telangiectasias to foot  -Mild 1+ pitting edema to RIGHT foot  NEURO:  -Protective sensation diminished with SWM +1/10 RIGHT and +0/10 LEFT on 2020  -Light touch sensation diminished to RIGHT plantar forefoot  DERM:  -Skin temperature, texture and turgor WNL b/l  -Toenails elongated, thickened, dystrophic and  discolored x 4  -Cicatrix to RIGHT dorsal first ray -- healed with no open wounds    Wound Location:  RIGHT medial plantar midfoot  03/03/2021  Measurement:  0.2cm x 0.1cm x 0.1cm through the subcutaneous tissue   Drainage:  Mild-to-moderate serous  Odor:  None  Undermining:  None post debridement  Edges:  Intact  Base:  100% viable  Surrounding Skin: Intact  No severe erythema, no ascending erythema, no calor, no purulence, no malodor, no other SOI.     02/17/2021  Measurement:  0.2cm x 0.4cm x 0.1cm through the skin only  Drainage:  Minimal serous  Odor:  None  Undermining:  None post debridement  Edges:  Intact  Base:  100% viable  Surrounding Skin: Intact  No severe erythema, no ascending erythema, no calor, no purulence, no malodor, no other SOI.     02/03/2021:  0.4cm x 0.4cm x 0.1cm through subcutaneous tissue  01/20/2021:  2.1cm x 1.4cm x 0.1cm through skin only    MSK:  -No Pain to the RIGHT foot  -Mild rocker botton deformity to the plantar lateral foot  -Muscle strength of ankles +5/5 for dorsiflexion, plantarflexion, ABDUction and ADDuction b/l    RIGHT FOOT RADIOGRAPH 11/04/2020                                                                   Impression:  Healing fracture involving the proximal aspect of the second metatarsal with prominent periosteal reaction/callus formation.  UDAY CAROLINA MD    RIGHT FOOT RADIOGRAPH 12/02/2020  IMPRESSION: Stable appearance of the right foot as compared to November 4, 2020  JONO DSOUZA MD    RADIOGRAPH RIGHT FOOT 12/15/2020  IMPRESSION: First metatarsal amputation at the level of the proximal third. There is soft tissue edema. There is some cortication of osteotomy margins suggesting a healing response.   CARLEE SHAY MD    RIGHT FOOT RADIOGRAPH 01/20/2201                                                                   IMPRESSION:   1. Amputation of the great toe at the proximal aspect of the first metatarsal.  2. Patient does have advanced arthritic  changes of the tarsometatarsal joints seen on both AP and on the lateral view with areas significant arthritic changes noted. Patient has a significant pes planus. These findings may be attributed to the history of Charcot joint.  3. I identify no erosive changes and no subcutaneous air. I don't see any definite evidence for osteomyelitis but if this is a strong consideration, I would recommend and MRI with gadolinium.  JENNIFER REID MD  ============================================================    ASSESSMENT:    (E11.621,  L97.412) Diabetic ulcer of right midfoot associated with type 2 diabetes mellitus, with fat layer exposed (H)  (primary encounter diagnosis)    (M14.671) Charcot's joint of right foot  (primary encounter diagnosis)    (L60.3) Onychodystrophy  (primary encounter diagnosis)    (I83.11,  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 Treatment options discussed with no educational barriers noted.    -Excisional debridement of wound on RIGHT plantar medial midfoot wound with a sharp tissue nipper through the subcutaneous tissue (<20 cm squared)  ---Dressed wound with triple antibiotic cream, gauze and Medipore tape  ---Patient has Mepilex Ag and he should resume Mepilex Ag and Medipore tape with daily dressing changes (starting tomorrow on 02/18/2021)    Offloading:  -Wound bed is slightly deeper past the skin to the subcutaneous tissues, but smaller in width. The patien thas been in a CAm boot for months and he would like to resume  his new, offloaded DM shoe. Patient may start with a very slow transition (one hour in the shoe and back to the boot, then two hours in the shoe and back to the boot). He is to carefully monitor his wound and if it appears larger, has any redness or any SOI (redness, swelling, pain, purulence, fever, chills, nausea, vomiting), then he is to go back to the boot full time.   ---If wound is not improved by next appointment, then will consider a Pressure Guardian boot. Discussed the use of a Pressure Guardian boot and how it works. Patient is in agreement with considering this plan if there is minimal improvement or worsening of the wound by the next appointment.  ---Most recent Dm shoes dispensed by and offloaded by the orthotist, Jo Ann Carranza, on 02/18/2021.    -Nails last debrided on 01/20/2021    -Most recent radiographs from 01/20/2021 -- patient's wound is plantar to the new prominence beneath the remainder of the 1st metatarsal base and is plantarflexed toward the ground likely secondary to Charcot changes and / or significant arch collapse.      -Patient in agreement with the above treatment plan and all of patient's questions were answered.      -Follow-up in two weeks in [facility] for plantar diabetic foot ulcer         Erin Harris DPM

## 2021-03-03 NOTE — NURSING NOTE
"Chief Complaint   Patient presents with     RECHECK     Right Foot Ulcer         Initial /80 (BP Location: Left arm)   Pulse 58   Temp 97.2  F (36.2  C) (Tympanic)   Wt 93 kg (205 lb)   SpO2 94%   BMI 30.27 kg/m   Estimated body mass index is 30.27 kg/m  as calculated from the following:    Height as of 2/17/21: 1.753 m (5' 9\").    Weight as of this encounter: 93 kg (205 lb).  Medication Reconciliation: complete  Tamica Baltazar LPN  "

## 2021-03-17 ENCOUNTER — OFFICE VISIT (OUTPATIENT)
Dept: PODIATRY | Facility: OTHER | Age: 86
End: 2021-03-17
Attending: PODIATRIST
Payer: MEDICARE

## 2021-03-17 VITALS
DIASTOLIC BLOOD PRESSURE: 70 MMHG | TEMPERATURE: 97 F | WEIGHT: 212 LBS | HEART RATE: 53 BPM | SYSTOLIC BLOOD PRESSURE: 124 MMHG | OXYGEN SATURATION: 94 % | BODY MASS INDEX: 31.31 KG/M2

## 2021-03-17 DIAGNOSIS — R20.8 LOSS OF PROTECTIVE SENSATION OF SKIN OF DEFORMED FOOT: ICD-10-CM

## 2021-03-17 DIAGNOSIS — Z89.431 STATUS POST PARTIAL AMPUTATION OF FOOT, RIGHT (H): ICD-10-CM

## 2021-03-17 DIAGNOSIS — L97.411 DIABETIC ULCER OF RIGHT MIDFOOT ASSOCIATED WITH TYPE 2 DIABETES MELLITUS, LIMITED TO BREAKDOWN OF SKIN (H): Primary | ICD-10-CM

## 2021-03-17 DIAGNOSIS — M21.969 LOSS OF PROTECTIVE SENSATION OF SKIN OF DEFORMED FOOT: ICD-10-CM

## 2021-03-17 DIAGNOSIS — E11.621 DIABETIC ULCER OF RIGHT MIDFOOT ASSOCIATED WITH TYPE 2 DIABETES MELLITUS, LIMITED TO BREAKDOWN OF SKIN (H): Primary | ICD-10-CM

## 2021-03-17 DIAGNOSIS — Z86.31 PERSONAL HISTORY OF DIABETIC FOOT ULCER: ICD-10-CM

## 2021-03-17 DIAGNOSIS — L60.3 ONYCHODYSTROPHY: ICD-10-CM

## 2021-03-17 DIAGNOSIS — I83.11 VARICOSE VEINS OF BOTH LOWER EXTREMITIES WITH INFLAMMATION: ICD-10-CM

## 2021-03-17 DIAGNOSIS — I78.1 TELANGIECTASIAS: ICD-10-CM

## 2021-03-17 DIAGNOSIS — E11.42 DIABETIC POLYNEUROPATHY ASSOCIATED WITH TYPE 2 DIABETES MELLITUS (H): ICD-10-CM

## 2021-03-17 DIAGNOSIS — I83.12 VARICOSE VEINS OF BOTH LOWER EXTREMITIES WITH INFLAMMATION: ICD-10-CM

## 2021-03-17 DIAGNOSIS — E11.9 DIABETES MELLITUS TYPE 2, NONINSULIN DEPENDENT (H): ICD-10-CM

## 2021-03-17 DIAGNOSIS — M14.671 CHARCOT'S JOINT OF RIGHT FOOT: ICD-10-CM

## 2021-03-17 DIAGNOSIS — N18.31 STAGE 3A CHRONIC KIDNEY DISEASE (H): ICD-10-CM

## 2021-03-17 PROCEDURE — 97597 DBRDMT OPN WND 1ST 20 CM/<: CPT | Performed by: PODIATRIST

## 2021-03-17 PROCEDURE — G0463 HOSPITAL OUTPT CLINIC VISIT: HCPCS

## 2021-03-17 ASSESSMENT — PAIN SCALES - GENERAL: PAINLEVEL: NO PAIN (0)

## 2021-03-17 NOTE — PROGRESS NOTES
Chief complaint: Patient presents with:  RECHECK: Right Foot Ulcer        History of Present Illness: This 85 year old male is seen for follow-up management of his RIGHT plantar diabetic foot ulcer. The ulcer was first noticed on 01/20/2021. He has been consistently wearing a CAM boot and changing his dressing daily with an antibiotic cream, Mepilex Ag and tape. He has not yet tried wearing his new DM shoes with an offloading insert.    The patient is wondering if this callus / wound location will continue to come back. He has no pain in the foot.    No further pedal complaints today.     Patient does not use tobacco products.     Last HbA1C was 6.0% on 01/25/2021.    ---Previously 6.2% on 07/22/2020.    ---Previously 6.0% on 01/13/2020.      -----------------------------------------------------    Historically, patient recently had an I&D with a partial first ray amputation of the RIGHT foot on 07/15/2020.        /70 (BP Location: Left arm, Patient Position: Sitting, Cuff Size: Adult Regular)   Pulse 53   Temp 97  F (36.1  C) (Tympanic)   Wt 96.2 kg (212 lb)   SpO2 94%   BMI 31.31 kg/m      Patient Active Problem List   Diagnosis     Diabetic foot infection (H)     Abdominal aortic aneurysm (AAA) without rupture (H)     Benign non-nodular prostatic hyperplasia with lower urinary tract symptoms     Diabetic polyneuropathy associated with type 2 diabetes mellitus (H)     Hyperlipidemia associated with type 2 diabetes mellitus (H)     Hypertension associated with diabetes (H)     PAD (peripheral artery disease) (H)     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)     Research exam     Statin intolerance     Type 2 diabetes mellitus with stage 3 chronic kidney disease, without long-term current use of insulin (H)       Past Surgical History:   Procedure Laterality Date     APPENDECTOMY       EYE SURGERY Right     lens implant     INCISION AND DRAINAGE FOOT, COMBINED Right 7/15/2020    Procedure: Right foot  incision and drainage with first ray amputation;  Surgeon: Erin Harris DPM;  Location: [facility]     JOINT REPLACEMENT Right      TONSILLECTOMY         Current Outpatient Medications   Medication     allopurinol (ZYLOPRIM) 100 MG tablet     amoxicillin (AMOXIL) 500 MG capsule     aspirin (ASA) 81 MG chewable tablet     atenolol (TENORMIN) 25 MG tablet     calcium carbonate (OS-FADY 600 MG [place]. [place]) 600 MG tablet     fluocinonide (LIDEX) 0.05 % external ointment     losartan (COZAAR) 25 MG tablet     Magnesium Oxide 500 MG TABS     Multiple Vitamin (MULTIVITAMINS PO)     naproxen sodium (ANAPROX) 220 MG tablet     pravastatin (PRAVACHOL) 10 MG tablet     tamsulosin (FLOMAX) 0.4 MG capsule     No current facility-administered medications for this visit.           Allergies   Allergen Reactions     Flu Virus Vaccine Anaphylaxis     Altace [Ramipril] Other (See Comments)     Dizziness       Ciprofloxacin Diarrhea     Hmg-Coa-R Inhibitors Muscle Pain (Myalgia)     Statin Intolerance Documentation  1. Unable to tolerate at least 2 statins: Crestor at the lowest starting daily dose and Zocor at any daily dose, due to reported symptoms which are temporally related to statin treatment and reversible upon statin discontinuation and other causes have been excluded.     Lisinopril Other (See Comments)     Dizziness       Levaquin [Levofloxacin] Unknown     Sulfa Drugs Unknown     Celebrex [Celecoxib] Rash     Hydrocodone Rash     Niacin Itching       Family History   Problem Relation Age of Onset     Cancer No family hx of        Social History     Socioeconomic History     Marital status:      Spouse name: Not on file     Number of children: Not on file     Years of education: Not on file     Highest education level: Not on file   Occupational History     Not on file   Social Needs     Financial resource strain: Not on file     Food insecurity     Worry: Not on file     Inability: Not on file     Transportation  needs     Medical: Not on file     Non-medical: Not on file   Tobacco Use     Smoking status: Former Smoker     Years: 50.00     Types: Cigarettes     Last attempt to quit: 2001     Years since quittin.5   Substance and Sexual Activity     Alcohol use: No     Drug use: Not on file     Sexual activity: Not on file   Lifestyle     Physical activity     Days per week: Not on file     Minutes per session: Not on file     Stress: Not on file   Relationships     Social connections     Talks on phone: Not on file     Gets together: Not on file     Attends Taoist service: Not on file     Active member of club or organization: Not on file     Attends meetings of clubs or organizations: Not on file     Relationship status: Not on file     Intimate partner violence     Fear of current or ex partner: Not on file     Emotionally abused: Not on file     Physically abused: Not on file     Forced sexual activity: Not on file   Other Topics Concern     Parent/sibling w/ CABG, MI or angioplasty before 65F 55M? Not Asked   Social History Narrative     Not on file       ROS: 10 point ROS neg other than the symptoms noted above in the HPI.  EXAM  Constitutional: healthy, alert and no distress    Psychiatric: mentation appears normal and affect normal/bright    RIGHT FOOT FOCUSED    VASCULAR:  -Dorsalis pedis pulse +2/4   -Posterior tibial pulse +1/4   -Capillary refill time < 3 seconds to  hallux  -Hair growth Absent to anterior leg and ankle  -Varicosities and telangiectasias to foot  -Mild 1+ pitting edema to RIGHT foot  NEURO:  -Protective sensation diminished with SWM +1/10 RIGHT and +0/10 LEFT on 2020  -Light touch sensation diminished to RIGHT plantar forefoot  DERM:  -Skin temperature, texture and turgor WNL b/l  -Toenails elongated, thickened, dystrophic and discolored x 4  -Cicatrix to RIGHT dorsal first ray -- healed with no open wounds    Wound Location:  RIGHT medial plantar  midfoot  03/17/2021  Measurement:  0.3cm x 0.3cm x minimal depth cm through the skin only  Drainage:  Mild-to-moderate serous  Odor:  None  Undermining:  None post debridement  Edges:  Intact  Base:  100% viable  Surrounding Skin: Intact  No severe erythema, no ascending erythema, no calor, no purulence, no malodor, no other SOI.     03/03/2021  Measurement:  0.2cm x 0.1cm x 0.1cm through the subcutaneous tissue   Drainage:  Mild-to-moderate serous  Odor:  None  Undermining:  None post debridement  Edges:  Intact  Base:  100% viable  Surrounding Skin: Intact  No severe erythema, no ascending erythema, no calor, no purulence, no malodor, no other SOI.     02/17/2021:  0.2cm x 0.4cm x 0.1cm through the skin only  02/03/2021:  0.4cm x 0.4cm x 0.1cm through subcutaneous tissue  01/20/2021:  2.1cm x 1.4cm x 0.1cm through skin only    MSK:  -No Pain to the RIGHT foot  -Mild rocker botton deformity to the plantar lateral foot  -Bony prominence beneath RIGHT foot plantar medial ulceration near base of the first metatarsal   -Muscle strength of ankles +5/5 for dorsiflexion, plantarflexion, ABDUction and ADDuction b/l    RIGHT FOOT RADIOGRAPH 11/04/2020                                                                   Impression:  Healing fracture involving the proximal aspect of the second metatarsal with prominent periosteal reaction/callus formation.  UDAY CAROLINA MD    RIGHT FOOT RADIOGRAPH 12/02/2020  IMPRESSION: Stable appearance of the right foot as compared to November 4, 2020  JONO DSOUZA MD    RADIOGRAPH RIGHT FOOT 12/15/2020  IMPRESSION: First metatarsal amputation at the level of the proximal third. There is soft tissue edema. There is some cortication of osteotomy margins suggesting a healing response.   CARLEE SHAY MD    RIGHT FOOT RADIOGRAPH 01/20/2201                                                                   IMPRESSION:   1. Amputation of the great toe at the proximal aspect of the first  metatarsal.  2. Patient does have advanced arthritic changes of the tarsometatarsal joints seen on both AP and on the lateral view with areas significant arthritic changes noted. Patient has a significant pes planus. These findings may be attributed to the history of Charcot joint.  3. I identify no erosive changes and no subcutaneous air. I don't see any definite evidence for osteomyelitis but if this is a strong consideration, I would recommend and MRI with gadolinium.  JENNIFER REID MD  ============================================================    ASSESSMENT:    (E11.621,  L97.411) Diabetic ulcer of right midfoot associated with type 2 diabetes mellitus, limited to breakdown of skin (H)  (primary encounter diagnosis)    (M14.671) Charcot's joint of right foot  (primary encounter diagnosis)    (L60.3) Onychodystrophy  (primary encounter diagnosis)    (I83.11,  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 Treatment options discussed with no educational barriers noted.    -Excisional debridement of wound on RIGHT plantar medial midfoot wound with a sharp 15 blade and sharp ring curette through the skin only (<20 cm squared)  ---Dressed wound with triple antibiotic cream, gauze and Medipore tape  ---Patient has Mepilex Ag and he should resume Mepilex Ag and Medipore tape with daily dressing changes (starting tomorrow on 03/18/2021)    Offloading:  -Wound is now only going through the skin. Because of the prominent bone, the callus will continue to  form. Again discussed the patient's slow transition into a Dm shoe, but advising he resume the CAM boot if the wound worsens while attempting the DM shoes.  -A TCC is not an option because patient lives alone and he says he needs to drive.  -Discussed a Pressure Guardian boot with the patient. He initially became upset that a Pressure Guardian boot wasn't dispensed to him when he first developed his RIGHT hallux wound that became infected and caused the need for a partial first ray amputation. After he described the offloading devices he received before he starting seeing podiatry at this [facility], he described a Pressure Guardian boot. He is asked to bring this boot with him to his next appointment since this may be the same boot to which he is being advised to wear. It is possible the boot may be modified or he may need a new boot. In the meantime, the patient will see if his foot is stable while wearing a Dm shoe or if the wound worsens. Patient knows to return to the CAM boot if the wound worsens. He checks the wound daily with a mirror.  -Patient was instructed to look for SOI (redness, swelling, pain, purulence, fever, chills, nausea, vomiting) and to return to podiatry or the emergency department immediately if there are any SOI.      -Nails last debrided on 01/20/2021    -Most recent radiographs from 01/20/2021 -- patient's wound is plantar to the new prominence beneath the remainder of the 1st metatarsal base and is plantarflexed toward the ground likely secondary to Charcot changes and / or significant arch collapse.      -Patient in agreement with the above treatment plan and all of patient's questions were answered.      -Follow-up in three weeks in [facility] for plantar diabetic foot ulcer -- patient is to bring his custom boot with him to the clinic        Erin Harris DPM

## 2021-03-17 NOTE — NURSING NOTE
"Chief Complaint   Patient presents with     RECHECK     Right Foot Ulcer         Initial /70 (BP Location: Left arm, Patient Position: Sitting, Cuff Size: Adult Regular)   Pulse 53   Temp 97  F (36.1  C) (Tympanic)   Wt 96.2 kg (212 lb)   SpO2 94%   BMI 31.31 kg/m   Estimated body mass index is 31.31 kg/m  as calculated from the following:    Height as of 2/17/21: 1.753 m (5' 9\").    Weight as of this encounter: 96.2 kg (212 lb).  Medication Reconciliation: complete  Tamica Baltazar LPN  "

## 2021-04-07 ENCOUNTER — OFFICE VISIT (OUTPATIENT)
Dept: PODIATRY | Facility: OTHER | Age: 86
End: 2021-04-07
Attending: PODIATRIST
Payer: MEDICARE

## 2021-04-07 VITALS
HEART RATE: 60 BPM | WEIGHT: 205 LBS | DIASTOLIC BLOOD PRESSURE: 54 MMHG | SYSTOLIC BLOOD PRESSURE: 120 MMHG | OXYGEN SATURATION: 97 % | TEMPERATURE: 97 F | BODY MASS INDEX: 30.27 KG/M2

## 2021-04-07 DIAGNOSIS — I83.11 VARICOSE VEINS OF BOTH LOWER EXTREMITIES WITH INFLAMMATION: ICD-10-CM

## 2021-04-07 DIAGNOSIS — Z86.31 PERSONAL HISTORY OF DIABETIC FOOT ULCER: ICD-10-CM

## 2021-04-07 DIAGNOSIS — E11.42 DIABETIC POLYNEUROPATHY ASSOCIATED WITH TYPE 2 DIABETES MELLITUS (H): ICD-10-CM

## 2021-04-07 DIAGNOSIS — L97.412 DIABETIC ULCER OF RIGHT MIDFOOT ASSOCIATED WITH TYPE 2 DIABETES MELLITUS, WITH FAT LAYER EXPOSED (H): Primary | ICD-10-CM

## 2021-04-07 DIAGNOSIS — M14.671 CHARCOT'S JOINT OF RIGHT FOOT: ICD-10-CM

## 2021-04-07 DIAGNOSIS — I78.1 TELANGIECTASIAS: ICD-10-CM

## 2021-04-07 DIAGNOSIS — I83.12 VARICOSE VEINS OF BOTH LOWER EXTREMITIES WITH INFLAMMATION: ICD-10-CM

## 2021-04-07 DIAGNOSIS — E11.621 DIABETIC ULCER OF RIGHT MIDFOOT ASSOCIATED WITH TYPE 2 DIABETES MELLITUS, WITH FAT LAYER EXPOSED (H): Primary | ICD-10-CM

## 2021-04-07 DIAGNOSIS — E11.9 DIABETES MELLITUS TYPE 2, NONINSULIN DEPENDENT (H): ICD-10-CM

## 2021-04-07 DIAGNOSIS — M21.969 LOSS OF PROTECTIVE SENSATION OF SKIN OF DEFORMED FOOT: ICD-10-CM

## 2021-04-07 DIAGNOSIS — R20.8 LOSS OF PROTECTIVE SENSATION OF SKIN OF DEFORMED FOOT: ICD-10-CM

## 2021-04-07 DIAGNOSIS — L60.3 ONYCHODYSTROPHY: ICD-10-CM

## 2021-04-07 DIAGNOSIS — Z89.431 STATUS POST PARTIAL AMPUTATION OF FOOT, RIGHT (H): ICD-10-CM

## 2021-04-07 DIAGNOSIS — N18.31 STAGE 3A CHRONIC KIDNEY DISEASE (H): ICD-10-CM

## 2021-04-07 PROCEDURE — G0463 HOSPITAL OUTPT CLINIC VISIT: HCPCS

## 2021-04-07 PROCEDURE — 11042 DBRDMT SUBQ TIS 1ST 20SQCM/<: CPT | Performed by: PODIATRIST

## 2021-04-07 ASSESSMENT — PAIN SCALES - GENERAL: PAINLEVEL: NO PAIN (0)

## 2021-04-07 NOTE — NURSING NOTE
"Chief Complaint   Patient presents with     RECHECK     Right Toe         Initial /54 (BP Location: Left arm, Patient Position: Sitting, Cuff Size: Adult Large)   Pulse 60   Temp 97  F (36.1  C) (Tympanic)   Wt 93 kg (205 lb)   SpO2 97%   BMI 30.27 kg/m   Estimated body mass index is 30.27 kg/m  as calculated from the following:    Height as of 2/17/21: 1.753 m (5' 9\").    Weight as of this encounter: 93 kg (205 lb).  Medication Reconciliation: complete  Tamica Baltazar LPN  "

## 2021-04-07 NOTE — PROGRESS NOTES
Chief complaint: Patient presents with:  RECHECK: Right Toe        History of Present Illness: This 85 year old male is seen for follow-up management of his RIGHT plantar diabetic foot ulcer. The ulcer was first noticed on 01/20/2021. He was consistently wearing a CAM boot and changing his dressing daily with an antibiotic cream, Mepilex Ag and tape. He is still changing the dressing daily, but he is out of dressing supplies and he has been wearing his DM shoes full time.     No further pedal complaints today.     Patient does not use tobacco products.     Last HbA1C was 6.0% on 01/25/2021.    ---Previously 6.2% on 07/22/2020.    ---Previously 6.0% on 01/13/2020.      -----------------------------------------------------    Historically, patient recently had an I&D with a partial first ray amputation of the RIGHT foot on 07/15/2020.        /54 (BP Location: Left arm, Patient Position: Sitting, Cuff Size: Adult Large)   Pulse 60   Temp 97  F (36.1  C) (Tympanic)   Wt 93 kg (205 lb)   SpO2 97%   BMI 30.27 kg/m      Patient Active Problem List   Diagnosis     Diabetic foot infection (H)     Abdominal aortic aneurysm (AAA) without rupture (H)     Benign non-nodular prostatic hyperplasia with lower urinary tract symptoms     Diabetic polyneuropathy associated with type 2 diabetes mellitus (H)     Hyperlipidemia associated with type 2 diabetes mellitus (H)     Hypertension associated with diabetes (H)     PAD (peripheral artery disease) (H)     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)     Research exam     Statin intolerance     Type 2 diabetes mellitus with stage 3 chronic kidney disease, without long-term current use of insulin (H)       Past Surgical History:   Procedure Laterality Date     APPENDECTOMY       EYE SURGERY Right     lens implant     INCISION AND DRAINAGE FOOT, COMBINED Right 7/15/2020    Procedure: Right foot incision and drainage with first ray amputation;  Surgeon: Erin Harris DPM;   Location: [facility]     JOINT REPLACEMENT Right      TONSILLECTOMY         Current Outpatient Medications   Medication     allopurinol (ZYLOPRIM) 100 MG tablet     amoxicillin (AMOXIL) 500 MG capsule     aspirin (ASA) 81 MG chewable tablet     atenolol (TENORMIN) 25 MG tablet     calcium carbonate (OS-FADY 600 MG [place]. [place]) 600 MG tablet     fluocinonide (LIDEX) 0.05 % external ointment     losartan (COZAAR) 25 MG tablet     Magnesium Oxide 500 MG TABS     Multiple Vitamin (MULTIVITAMINS PO)     naproxen sodium (ANAPROX) 220 MG tablet     pravastatin (PRAVACHOL) 10 MG tablet     tamsulosin (FLOMAX) 0.4 MG capsule     No current facility-administered medications for this visit.           Allergies   Allergen Reactions     Flu Virus Vaccine Anaphylaxis     Altace [Ramipril] Other (See Comments)     Dizziness       Ciprofloxacin Diarrhea     Hmg-Coa-R Inhibitors Muscle Pain (Myalgia)     Statin Intolerance Documentation  1. Unable to tolerate at least 2 statins: Crestor at the lowest starting daily dose and Zocor at any daily dose, due to reported symptoms which are temporally related to statin treatment and reversible upon statin discontinuation and other causes have been excluded.     Lisinopril Other (See Comments)     Dizziness       Levaquin [Levofloxacin] Unknown     Sulfa Drugs Unknown     Celebrex [Celecoxib] Rash     Hydrocodone Rash     Niacin Itching       Family History   Problem Relation Age of Onset     Cancer No family hx of        Social History     Socioeconomic History     Marital status:      Spouse name: Not on file     Number of children: Not on file     Years of education: Not on file     Highest education level: Not on file   Occupational History     Not on file   Social Needs     Financial resource strain: Not on file     Food insecurity     Worry: Not on file     Inability: Not on file     Transportation needs     Medical: Not on file     Non-medical: Not on file   Tobacco Use     Smoking  status: Former Smoker     Years: 50.00     Types: Cigarettes     Last attempt to quit: 2001     Years since quittin.5   Substance and Sexual Activity     Alcohol use: No     Drug use: Not on file     Sexual activity: Not on file   Lifestyle     Physical activity     Days per week: Not on file     Minutes per session: Not on file     Stress: Not on file   Relationships     Social connections     Talks on phone: Not on file     Gets together: Not on file     Attends Yarsanism service: Not on file     Active member of club or organization: Not on file     Attends meetings of clubs or organizations: Not on file     Relationship status: Not on file     Intimate partner violence     Fear of current or ex partner: Not on file     Emotionally abused: Not on file     Physically abused: Not on file     Forced sexual activity: Not on file   Other Topics Concern     Parent/sibling w/ CABG, MI or angioplasty before 65F 55M? Not Asked   Social History Narrative     Not on file       ROS: 10 point ROS neg other than the symptoms noted above in the HPI.  EXAM  Constitutional: healthy, alert and no distress    Psychiatric: mentation appears normal and affect normal/bright    RIGHT FOOT FOCUSED    VASCULAR:  -Dorsalis pedis pulse +2/4   -Posterior tibial pulse +1/4   -Capillary refill time < 3 seconds to  hallux  -Hair growth Absent to anterior leg and ankle  -Varicosities and telangiectasias to foot  -Mild 1+ pitting edema to RIGHT foot  NEURO:  -Light touch sensation diminished to RIGHT plantar forefoot  -Protective sensation diminished with SWM +1/10 RIGHT and +0/10 LEFT on 2020  DERM:  -Skin temperature, texture and turgor WNL b/l  -Toenails elongated, thickened, dystrophic and discolored x 4  -Cicatrix to RIGHT dorsal first ray -- healed with no open wounds    Wound Location:  RIGHT medial plantar midfoot  2021  Measurement:  1.6cm x 1.1 x 0.1cm through subcutaneous tissue  Drainage:  Mild-to-moderate  serous  Odor:  None  Undermining:  None post debridement  Edges:  Intact  Base:  100% viable (wound was pinpoint in size but there was an underlying blister. Once the blistered skin was debrided, the wound was revealed beneath the skin).  Surrounding Skin: Intact but macerated   No severe erythema, no ascending erythema, no calor, no purulence, no malodor, no other SOI.     03/17/2021  Measurement:  0.3cm x 0.3cm x minimal depth cm through the skin only  Drainage:  Mild-to-moderate serous  Odor:  None  Undermining:  None post debridement  Edges:  Intact  Base:  100% viable  Surrounding Skin: Intact  No severe erythema, no ascending erythema, no calor, no purulence, no malodor, no other SOI.     03/03/2021:  0.2cm x 0.1cm x 0.1cm through the subcutaneous tissue   02/17/2021:  0.2cm x 0.4cm x 0.1cm through the skin only  02/03/2021:  0.4cm x 0.4cm x 0.1cm through subcutaneous tissue  01/20/2021:  2.1cm x 1.4cm x 0.1cm through skin only    MSK:  -No Pain to the RIGHT foot  -Mild rocker botton deformity to the plantar lateral foot  -Bony prominence beneath RIGHT foot plantar medial ulceration near base of the first metatarsal   -Muscle strength of ankles +5/5 for dorsiflexion, plantarflexion, ABDUction and ADDuction b/l    RIGHT FOOT RADIOGRAPH 11/04/2020                                                                   Impression:  Healing fracture involving the proximal aspect of the second metatarsal with prominent periosteal reaction/callus formation.  UDAY CAROLINA MD    RIGHT FOOT RADIOGRAPH 12/02/2020  IMPRESSION: Stable appearance of the right foot as compared to November 4, 2020  JONO DSOUZA MD    RADIOGRAPH RIGHT FOOT 12/15/2020  IMPRESSION: First metatarsal amputation at the level of the proximal third. There is soft tissue edema. There is some cortication of osteotomy margins suggesting a healing response.   CARLEE SHAY MD    RIGHT FOOT RADIOGRAPH 01/20/2201                                                                    IMPRESSION:   1. Amputation of the great toe at the proximal aspect of the first metatarsal.  2. Patient does have advanced arthritic changes of the tarsometatarsal joints seen on both AP and on the lateral view with areas significant arthritic changes noted. Patient has a significant pes planus. These findings may be attributed to the history of Charcot joint.  3. I identify no erosive changes and no subcutaneous air. I don't see any definite evidence for osteomyelitis but if this is a strong consideration, I would recommend and MRI with gadolinium.  JENNIFER REID MD  ============================================================    ASSESSMENT:    (E11.621,  L97.412) Diabetic ulcer of right midfoot associated with type 2 diabetes mellitus, with fat layer exposed (H)  (primary encounter diagnosis)    (M14.671) Charcot's joint of right foot  (primary encounter diagnosis)    (L60.3) Onychodystrophy  (primary encounter diagnosis)    (I83.11,  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 Treatment options discussed with no educational barriers noted.    -Excisional debridement of wound on RIGHT plantar medial midfoot wound with a sharp 15 blade and sharp ring curette through the subcutaneous tissue (<20 cm squared)  ---Dressed wound with Mepilex Ag and Medipore tape  ---DME order for Mepilex Ag and Medipore tape  ---Patient has Mepilex Ag and he should resume Mepilex Ag and Medipore tape with daily dressing changes (starting  tomorrow on 03/18/2021)  -Patient was instructed to look for SOI (redness, swelling, pain, purulence, fever, chills, nausea, vomiting) and to return to podiatry or the emergency department immediately if there are any SOI.      Offloading:  -Wound is larger due to a blister that developed next to the ulceration site. The skin was macerated from the blister but appears healthy. The DM shoe inserts may need to be adjusted again.  ---Message sent to the orthotist, Jo Ann Carranza, on 04/07/2021 to see when patient's shoe inserts can be adjusted within the next couple of weeks    ---Previous offloading discussions:  -A TCC is not an option because patient lives alone and he says he needs to drive.  -Discussed a Pressure Guardian boot with the patient. He initially became upset that a Pressure Guardian boot wasn't dispensed to him when he first developed his RIGHT hallux wound that became infected and caused the need for a partial first ray amputation. After he described the offloading devices he received before he starting seeing podiatry at this [facility], he described a Pressure Guardian boot. He is asked to bring this boot with him to his next appointment since this may be the same boot to which he is being advised to wear. It is possible the boot may be modified or he may need a new boot. In the meantime, the patient will see if his foot is stable while wearing a Dm shoe or if the wound worsens. Patient knows to return to the CAM boot if the wound worsens. He checks the wound daily with a mirror.    -Nails last debrided on 01/20/2021    -Most recent radiographs from 01/20/2021 -- patient's wound is plantar to the new prominence beneath the remainder of the 1st metatarsal base and is plantarflexed toward the ground likely secondary to Charcot changes and / or significant arch collapse.      -Patient in agreement with the above treatment plan and all of patient's questions were answered.      -Follow-up in two weeks in [place] for RIGHT foot plantar diabetic foot ulcer -- patient is to bring his custom boot with him to the clinic        Erin Harris DPM

## 2021-04-21 ENCOUNTER — OFFICE VISIT (OUTPATIENT)
Dept: PODIATRY | Facility: OTHER | Age: 86
End: 2021-04-21
Attending: PODIATRIST
Payer: MEDICARE

## 2021-04-21 VITALS
BODY MASS INDEX: 31.1 KG/M2 | SYSTOLIC BLOOD PRESSURE: 120 MMHG | WEIGHT: 210.6 LBS | OXYGEN SATURATION: 92 % | TEMPERATURE: 97.6 F | HEART RATE: 68 BPM | DIASTOLIC BLOOD PRESSURE: 62 MMHG

## 2021-04-21 DIAGNOSIS — M14.671 CHARCOT'S JOINT OF RIGHT FOOT: ICD-10-CM

## 2021-04-21 DIAGNOSIS — L60.3 ONYCHODYSTROPHY: Primary | ICD-10-CM

## 2021-04-21 DIAGNOSIS — Z86.31 PERSONAL HISTORY OF DIABETIC FOOT ULCER: ICD-10-CM

## 2021-04-21 DIAGNOSIS — M21.969 LOSS OF PROTECTIVE SENSATION OF SKIN OF DEFORMED FOOT: ICD-10-CM

## 2021-04-21 DIAGNOSIS — Z89.431 STATUS POST PARTIAL AMPUTATION OF FOOT, RIGHT (H): ICD-10-CM

## 2021-04-21 DIAGNOSIS — E11.9 DIABETES MELLITUS TYPE 2, NONINSULIN DEPENDENT (H): ICD-10-CM

## 2021-04-21 DIAGNOSIS — I78.1 TELANGIECTASIAS: ICD-10-CM

## 2021-04-21 DIAGNOSIS — R20.8 LOSS OF PROTECTIVE SENSATION OF SKIN OF DEFORMED FOOT: ICD-10-CM

## 2021-04-21 DIAGNOSIS — I83.12 VARICOSE VEINS OF BOTH LOWER EXTREMITIES WITH INFLAMMATION: ICD-10-CM

## 2021-04-21 DIAGNOSIS — I83.11 VARICOSE VEINS OF BOTH LOWER EXTREMITIES WITH INFLAMMATION: ICD-10-CM

## 2021-04-21 DIAGNOSIS — N18.31 STAGE 3A CHRONIC KIDNEY DISEASE (H): ICD-10-CM

## 2021-04-21 DIAGNOSIS — E11.42 DIABETIC POLYNEUROPATHY ASSOCIATED WITH TYPE 2 DIABETES MELLITUS (H): ICD-10-CM

## 2021-04-21 PROCEDURE — 99213 OFFICE O/P EST LOW 20 MIN: CPT | Mod: 25 | Performed by: PODIATRIST

## 2021-04-21 PROCEDURE — G0463 HOSPITAL OUTPT CLINIC VISIT: HCPCS | Mod: 25

## 2021-04-21 PROCEDURE — 11721 DEBRIDE NAIL 6 OR MORE: CPT | Performed by: PODIATRIST

## 2021-04-21 ASSESSMENT — PAIN SCALES - GENERAL: PAINLEVEL: NO PAIN (0)

## 2021-04-21 NOTE — PROGRESS NOTES
Chief complaint: Patient presents with:  RECHECK      History of Present Illness: This 85 year old male is seen for follow-up management of his RIGHT plantar diabetic foot ulcer. The ulcer was first noticed on 01/20/2021. He has continued wearing his new DM shoes and they were further offloaded by the orthotist, Jo Ann Carranza, last week (mid April, 2021). She further offloaded the wound site and she added an anti-shear material to the liner where he had developed a blister. He is wearing the shoes all the time. He has applied a dry dressing to the wound site for the past few days and he has not noticed any drainage.    No further pedal complaints today.     Patient does not use tobacco products.     Last HbA1C was 6.0% on 01/25/2021.    ---Previously 6.2% on 07/22/2020.    ---Previously 6.0% on 01/13/2020.      -----------------------------------------------------    Historically, patient recently had an I&D with a partial first ray amputation of the RIGHT foot on 07/15/2020.        /62   Pulse 68   Temp 97.6  F (36.4  C) (Tympanic)   Wt 95.5 kg (210 lb 9.6 oz)   SpO2 92%   BMI 31.10 kg/m      Patient Active Problem List   Diagnosis     Diabetic foot infection (H)     Abdominal aortic aneurysm (AAA) without rupture (H)     Benign non-nodular prostatic hyperplasia with lower urinary tract symptoms     Diabetic polyneuropathy associated with type 2 diabetes mellitus (H)     Hyperlipidemia associated with type 2 diabetes mellitus (H)     Hypertension associated with diabetes (H)     PAD (peripheral artery disease) (H)     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)     Research exam     Statin intolerance     Type 2 diabetes mellitus with stage 3 chronic kidney disease, without long-term current use of insulin (H)       Past Surgical History:   Procedure Laterality Date     APPENDECTOMY       EYE SURGERY Right     lens implant     INCISION AND DRAINAGE FOOT, COMBINED Right 7/15/2020    Procedure: Right foot  incision and drainage with first ray amputation;  Surgeon: Erin Harris DPM;  Location: [facility]     JOINT REPLACEMENT Right      TONSILLECTOMY         Current Outpatient Medications   Medication     allopurinol (ZYLOPRIM) 100 MG tablet     amoxicillin (AMOXIL) 500 MG capsule     aspirin (ASA) 81 MG chewable tablet     atenolol (TENORMIN) 25 MG tablet     calcium carbonate (OS-FADY 600 MG [place]. [place]) 600 MG tablet     fluocinonide (LIDEX) 0.05 % external ointment     losartan (COZAAR) 25 MG tablet     Magnesium Oxide 500 MG TABS     Multiple Vitamin (MULTIVITAMINS PO)     naproxen sodium (ANAPROX) 220 MG tablet     pravastatin (PRAVACHOL) 10 MG tablet     tamsulosin (FLOMAX) 0.4 MG capsule     No current facility-administered medications for this visit.           Allergies   Allergen Reactions     Flu Virus Vaccine Anaphylaxis     Altace [Ramipril] Other (See Comments)     Dizziness       Ciprofloxacin Diarrhea     Hmg-Coa-R Inhibitors Muscle Pain (Myalgia)     Statin Intolerance Documentation  1. Unable to tolerate at least 2 statins: Crestor at the lowest starting daily dose and Zocor at any daily dose, due to reported symptoms which are temporally related to statin treatment and reversible upon statin discontinuation and other causes have been excluded.     Lisinopril Other (See Comments)     Dizziness       Levaquin [Levofloxacin] Unknown     Sulfa Drugs Unknown     Celebrex [Celecoxib] Rash     Hydrocodone Rash     Niacin Itching       Family History   Problem Relation Age of Onset     Cancer No family hx of        Social History     Socioeconomic History     Marital status:      Spouse name: Not on file     Number of children: Not on file     Years of education: Not on file     Highest education level: Not on file   Occupational History     Not on file   Social Needs     Financial resource strain: Not on file     Food insecurity     Worry: Not on file     Inability: Not on file     Transportation  needs     Medical: Not on file     Non-medical: Not on file   Tobacco Use     Smoking status: Former Smoker     Years: 50.00     Types: Cigarettes     Last attempt to quit: 2001     Years since quittin.5   Substance and Sexual Activity     Alcohol use: No     Drug use: Not on file     Sexual activity: Not on file   Lifestyle     Physical activity     Days per week: Not on file     Minutes per session: Not on file     Stress: Not on file   Relationships     Social connections     Talks on phone: Not on file     Gets together: Not on file     Attends Jainism service: Not on file     Active member of club or organization: Not on file     Attends meetings of clubs or organizations: Not on file     Relationship status: Not on file     Intimate partner violence     Fear of current or ex partner: Not on file     Emotionally abused: Not on file     Physically abused: Not on file     Forced sexual activity: Not on file   Other Topics Concern     Parent/sibling w/ CABG, MI or angioplasty before 65F 55M? Not Asked   Social History Narrative     Not on file       ROS: 10 point ROS neg other than the symptoms noted above in the HPI.  EXAM  Constitutional: healthy, alert and no distress    Psychiatric: mentation appears normal and affect normal/bright    RIGHT FOOT FOCUSED    VASCULAR:  -Dorsalis pedis pulse +2/4   -Posterior tibial pulse +1/4   -Capillary refill time < 3 seconds to  hallux  -Hair growth Absent to anterior leg and ankle  -Varicosities and telangiectasias to foot  -Mild 1+ pitting edema to RIGHT foot  NEURO:  -Light touch sensation diminished to RIGHT plantar forefoot  -Protective sensation diminished with SWM +1/10 RIGHT and +0/10 LEFT on 2020  DERM:  -Skin temperature, texture and turgor WNL b/l  -Toenails elongated, thickened, dystrophic and discolored x 9  -Cicatrix to RIGHT dorsal first ray -- healed with no open wounds    Wound Location:  RIGHT medial plantar midfoot    2021:  HEALED      04/07/2021  Measurement:  1.6cm x 1.1 x 0.1cm through subcutaneous tissue  Drainage:  Mild-to-moderate serous  Odor:  None  Undermining:  None post debridement  Edges:  Intact  Base:  100% viable (wound was pinpoint in size but there was an underlying blister. Once the blistered skin was debrided, the wound was revealed beneath the skin).  Surrounding Skin: Intact but macerated   No severe erythema, no ascending erythema, no calor, no purulence, no malodor, no other SOI.     03/17/2021:  0.3cm x 0.3cm x minimal depth cm through the skin only  03/03/2021:  0.2cm x 0.1cm x 0.1cm through the subcutaneous tissue   02/17/2021:  0.2cm x 0.4cm x 0.1cm through the skin only  02/03/2021:  0.4cm x 0.4cm x 0.1cm through subcutaneous tissue  01/20/2021:  2.1cm x 1.4cm x 0.1cm through skin only    MSK:  -No Pain to the RIGHT foot  -Mild rocker botton deformity to the plantar lateral foot  -Bony prominence beneath RIGHT foot plantar medial ulceration near base of the first metatarsal   -Muscle strength of ankles +5/5 for dorsiflexion, plantarflexion, ABDUction and ADDuction b/l    RIGHT FOOT RADIOGRAPH 11/04/2020                                                                   Impression:  Healing fracture involving the proximal aspect of the second metatarsal with prominent periosteal reaction/callus formation.  UDAY CAROLINA MD    RIGHT FOOT RADIOGRAPH 12/02/2020  IMPRESSION: Stable appearance of the right foot as compared to November 4, 2020  JONO DSOUZA MD    RADIOGRAPH RIGHT FOOT 12/15/2020  IMPRESSION: First metatarsal amputation at the level of the proximal third. There is soft tissue edema. There is some cortication of osteotomy margins suggesting a healing response.   CARLEE SHAY MD    RIGHT FOOT RADIOGRAPH 01/20/2201                                                                   IMPRESSION:   1. Amputation of the great toe at the proximal aspect of the first metatarsal.  2. Patient does have  advanced arthritic changes of the tarsometatarsal joints seen on both AP and on the lateral view with areas significant arthritic changes noted. Patient has a significant pes planus. These findings may be attributed to the history of Charcot joint.  3. I identify no erosive changes and no subcutaneous air. I don't see any definite evidence for osteomyelitis but if this is a strong consideration, I would recommend and MRI with gadolinium.  JENNIFER REID MD  ============================================================    ASSESSMENT:    (L60.3) Onychodystrophy  (primary encounter diagnosis)    (M14.671) Charcot's joint of right foot  (primary encounter diagnosis)    (I83.11,  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 Treatment options discussed with no educational barriers noted.    -Minimal paring of RIGHT plantar medial distal foot ulcer -- wound is healed  -Applied a dry gauze and tape over wound site. Patient is to apply a dry gauze dressing daily for the next few days and if he still has no drainage, he may discontinue the dressing.  -Patient was instructed to look for SOI (redness, swelling, pain, purulence, fever, chills, nausea, vomiting) and to return to podiatry or the emergency department immediately if there are any SOI.      Offloading:   -DM shoes further offloaded and an anti-shear material was added to the insert by the orthotist, Jo Ann Carranza, in mid April, 2021.  -Wound is healed. Patient to continue wearing offloading DM shoe at all  times while walking  -Explained to the patient that this is an area of the foot that will continue to be prone to break down, so he is advised to check the foot daily. He is in agreement with this plan.    Wound is larger due to a blister that developed next to the ulceration site. The skin was macerated from the blister but appears healthy. The DM shoe inserts may need to be adjusted again.  ---Message sent to the orthotist, Jo Ann Carranza, on 04/07/2021 to see when patient's shoe inserts can be adjusted within the next couple of weeks    -Nails debrided x 10 without incident      -Most recent radiographs from 01/20/2021 -- patient's most recent wound (healed on 04/21/2021) is plantar to the new prominence beneath the remainder of the 1st metatarsal base and is plantarflexed toward the ground likely secondary to Charcot changes and / or significant arch collapse.      -Patient in agreement with the above treatment plan and all of patient's questions were answered.      -Follow-up in four weeks in [facility] for RIGHT foot plantar diabetic foot ulcer  ---Will follow-up in 63+ days from 04/21/2021 if the wound is still healed at patient's follow-up appointment        Erin Harris DPM

## 2021-04-21 NOTE — NURSING NOTE
"Chief Complaint   Patient presents with     RECHECK       Initial /62   Pulse 68   Temp 97.6  F (36.4  C) (Tympanic)   Wt 95.5 kg (210 lb 9.6 oz)   SpO2 92%   BMI 31.10 kg/m   Estimated body mass index is 31.1 kg/m  as calculated from the following:    Height as of 2/17/21: 1.753 m (5' 9\").    Weight as of this encounter: 95.5 kg (210 lb 9.6 oz).  Medication Reconciliation: complete  Norma Salvador LPN      "

## 2021-05-19 ENCOUNTER — OFFICE VISIT (OUTPATIENT)
Dept: PODIATRY | Facility: OTHER | Age: 86
End: 2021-05-19
Attending: PODIATRIST
Payer: MEDICARE

## 2021-05-19 VITALS
HEART RATE: 61 BPM | DIASTOLIC BLOOD PRESSURE: 52 MMHG | SYSTOLIC BLOOD PRESSURE: 100 MMHG | WEIGHT: 214 LBS | OXYGEN SATURATION: 98 % | TEMPERATURE: 97.9 F | BODY MASS INDEX: 31.6 KG/M2

## 2021-05-19 DIAGNOSIS — Z89.431 STATUS POST PARTIAL AMPUTATION OF FOOT, RIGHT (H): ICD-10-CM

## 2021-05-19 DIAGNOSIS — E11.9 DIABETES MELLITUS TYPE 2, NONINSULIN DEPENDENT (H): ICD-10-CM

## 2021-05-19 DIAGNOSIS — M14.671 CHARCOT'S JOINT OF RIGHT FOOT: ICD-10-CM

## 2021-05-19 DIAGNOSIS — E11.42 DIABETIC POLYNEUROPATHY ASSOCIATED WITH TYPE 2 DIABETES MELLITUS (H): ICD-10-CM

## 2021-05-19 DIAGNOSIS — I83.11 VARICOSE VEINS OF BOTH LOWER EXTREMITIES WITH INFLAMMATION: ICD-10-CM

## 2021-05-19 DIAGNOSIS — M21.969 LOSS OF PROTECTIVE SENSATION OF SKIN OF DEFORMED FOOT: ICD-10-CM

## 2021-05-19 DIAGNOSIS — N18.31 STAGE 3A CHRONIC KIDNEY DISEASE (H): ICD-10-CM

## 2021-05-19 DIAGNOSIS — Z86.31 PERSONAL HISTORY OF DIABETIC FOOT ULCER: ICD-10-CM

## 2021-05-19 DIAGNOSIS — I83.12 VARICOSE VEINS OF BOTH LOWER EXTREMITIES WITH INFLAMMATION: ICD-10-CM

## 2021-05-19 DIAGNOSIS — I78.1 TELANGIECTASIAS: ICD-10-CM

## 2021-05-19 DIAGNOSIS — R20.8 LOSS OF PROTECTIVE SENSATION OF SKIN OF DEFORMED FOOT: ICD-10-CM

## 2021-05-19 DIAGNOSIS — L60.3 ONYCHODYSTROPHY: Primary | ICD-10-CM

## 2021-05-19 PROCEDURE — G0463 HOSPITAL OUTPT CLINIC VISIT: HCPCS

## 2021-05-19 PROCEDURE — 99212 OFFICE O/P EST SF 10 MIN: CPT | Performed by: PODIATRIST

## 2021-05-19 ASSESSMENT — PAIN SCALES - GENERAL: PAINLEVEL: NO PAIN (0)

## 2021-05-19 NOTE — PROGRESS NOTES
Chief complaint: Patient presents with:  RECHECK: Follow up Charcot  Right Foot  Wound Check: Right Foot        History of Present Illness: This 85 year old male is seen for follow-up management of his RIGHT plantar diabetic foot ulcer. He is still applying a pad to the previous ulcer site every day. He is also consistently wearing his DM shoes with offloading inserts. He says he has two pairs of offloading inserts with one pair in his DM shoes and one pair in his boots. He says the ulcer has remained healed and there is nothing draining or open on his foot.    No further pedal complaints today.     Patient does not use tobacco products.     Last HbA1C was 6.0% on 01/25/2021.    ---Previously 6.2% on 07/22/2020.    ---Previously 6.0% on 01/13/2020.      -----------------------------------------------------    Historically, patient recently had an I&D with a partial first ray amputation of the RIGHT foot on 07/15/2020.        /52 (BP Location: Left arm, Patient Position: Sitting, Cuff Size: Adult Large)   Pulse 61   Temp 97.9  F (36.6  C) (Tympanic)   Wt 97.1 kg (214 lb)   SpO2 98%   BMI 31.60 kg/m      Patient Active Problem List   Diagnosis     Diabetic foot infection (H)     Abdominal aortic aneurysm (AAA) without rupture (H)     Benign non-nodular prostatic hyperplasia with lower urinary tract symptoms     Diabetic polyneuropathy associated with type 2 diabetes mellitus (H)     Hyperlipidemia associated with type 2 diabetes mellitus (H)     Hypertension associated with diabetes (H)     PAD (peripheral artery disease) (H)     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)     Research exam     Statin intolerance     Type 2 diabetes mellitus with stage 3 chronic kidney disease, without long-term current use of insulin (H)       Past Surgical History:   Procedure Laterality Date     APPENDECTOMY       EYE SURGERY Right     lens implant     INCISION AND DRAINAGE FOOT, COMBINED Right 7/15/2020    Procedure:  Right foot incision and drainage with first ray amputation;  Surgeon: Erin Harris DPM;  Location: [facility]     JOINT REPLACEMENT Right      TONSILLECTOMY         Current Outpatient Medications   Medication     allopurinol (ZYLOPRIM) 100 MG tablet     aspirin (ASA) 81 MG chewable tablet     atenolol (TENORMIN) 25 MG tablet     calcium carbonate (OS-FADY 600 MG [place]. [place]) 600 MG tablet     fluocinonide (LIDEX) 0.05 % external ointment     losartan (COZAAR) 25 MG tablet     Magnesium Oxide 500 MG TABS     Multiple Vitamin (MULTIVITAMINS PO)     naproxen sodium (ANAPROX) 220 MG tablet     pravastatin (PRAVACHOL) 10 MG tablet     tamsulosin (FLOMAX) 0.4 MG capsule     amoxicillin (AMOXIL) 500 MG capsule     No current facility-administered medications for this visit.           Allergies   Allergen Reactions     Flu Virus Vaccine Anaphylaxis     Altace [Ramipril] Other (See Comments)     Dizziness       Ciprofloxacin Diarrhea     Hmg-Coa-R Inhibitors Muscle Pain (Myalgia)     Statin Intolerance Documentation  1. Unable to tolerate at least 2 statins: Crestor at the lowest starting daily dose and Zocor at any daily dose, due to reported symptoms which are temporally related to statin treatment and reversible upon statin discontinuation and other causes have been excluded.     Lisinopril Other (See Comments)     Dizziness       Levaquin [Levofloxacin] Unknown     Sulfa Drugs Unknown     Celebrex [Celecoxib] Rash     Hydrocodone Rash     Niacin Itching       Family History   Problem Relation Age of Onset     Cancer No family hx of        Social History     Socioeconomic History     Marital status:      Spouse name: Not on file     Number of children: Not on file     Years of education: Not on file     Highest education level: Not on file   Occupational History     Not on file   Social Needs     Financial resource strain: Not on file     Food insecurity     Worry: Not on file     Inability: Not on file      Transportation needs     Medical: Not on file     Non-medical: Not on file   Tobacco Use     Smoking status: Former Smoker     Years: 50.00     Types: Cigarettes     Last attempt to quit: 2001     Years since quittin.5   Substance and Sexual Activity     Alcohol use: No     Drug use: Not on file     Sexual activity: Not on file   Lifestyle     Physical activity     Days per week: Not on file     Minutes per session: Not on file     Stress: Not on file   Relationships     Social connections     Talks on phone: Not on file     Gets together: Not on file     Attends [employer] service: Not on file     Active member of club or organization: Not on file     Attends meetings of clubs or organizations: Not on file     Relationship status: Not on file     Intimate partner violence     Fear of current or ex partner: Not on file     Emotionally abused: Not on file     Physically abused: Not on file     Forced sexual activity: Not on file   Other Topics Concern     Parent/sibling w/ CABG, MI or angioplasty before 65F 55M? Not Asked   Social History Narrative     Not on file       ROS: 10 point ROS neg other than the symptoms noted above in the HPI.  EXAM  Constitutional: healthy, alert and no distress    Psychiatric: mentation appears normal and affect normal/bright    RIGHT FOOT FOCUSED    VASCULAR:  -Dorsalis pedis pulse +2/4   -Posterior tibial pulse +1/4   -Capillary refill time < 3 seconds to  hallux  -Hair growth Absent to anterior leg and ankle  -Varicosities and telangiectasias to foot  -Mild 1+ pitting edema to RIGHT foot  NEURO:  -Light touch sensation diminished to RIGHT plantar forefoot  -Protective sensation diminished with SWM +1/10 RIGHT and +0/10 LEFT on 2020  DERM:  -Skin temperature, texture and turgor WNL   -Toenails elongated, thickened, dystrophic and discolored x 4  -Cicatrix to RIGHT dorsal first ray -- healed with no open wounds    Wound Location:  RIGHT medial plantar  midfoot    05/19/2012: HEALED with no overlying hyperkeratotic lesion     04/21/2021:  HEALED     04/07/2021  Measurement:  1.6cm x 1.1 x 0.1cm through subcutaneous tissue  Drainage:  Mild-to-moderate serous  Odor:  None  Undermining:  None post debridement  Edges:  Intact  Base:  100% viable (wound was pinpoint in size but there was an underlying blister. Once the blistered skin was debrided, the wound was revealed beneath the skin).  Surrounding Skin: Intact but macerated   No severe erythema, no ascending erythema, no calor, no purulence, no malodor, no other SOI.     03/17/2021:  0.3cm x 0.3cm x minimal depth cm through the skin only  03/03/2021:  0.2cm x 0.1cm x 0.1cm through the subcutaneous tissue   02/17/2021:  0.2cm x 0.4cm x 0.1cm through the skin only  02/03/2021:  0.4cm x 0.4cm x 0.1cm through subcutaneous tissue  01/20/2021:  2.1cm x 1.4cm x 0.1cm through skin only    MSK:  -No Pain to the RIGHT foot  -Mild rocker botton deformity to the plantar lateral foot  -Bony prominence beneath RIGHT foot plantar medial ulceration near base of the first metatarsal   -Muscle strength of ankles +5/5 for dorsiflexion, plantarflexion, ABDUction and ADDuction b/l  ============================================================    ASSESSMENT:    (L60.3) Onychodystrophy  (primary encounter diagnosis)    (M14.671) Charcot's joint of right foot  (primary encounter diagnosis)    (I83.11,  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 Treatment options discussed with  no educational barriers noted.    -No hyperkeratotic lesion was located over the ulcerative site and no debridement or paring was required today. The RIGHT plantar foot ulcer has remained healed.    Offloading:   -Continue DM shoes (offloaded for the ulcer and an anti-shear material was added to the insert by the orthotist, Jo Ann Carranza, in mid April, 2021).  -Wound remains healed. Patient to continue wearing offloading DM shoe at all times while walking      -Patient advised to continue to monitor his foot daily for any open wounds or any SOI.    -Nails last debrided on 04/21/2021     -Patient in agreement with the above treatment plan and all of patient's questions were answered.      -Follow-up in 63+ days for diabetic foot exam and high risk nail debridement       Erin Harris DPM

## 2021-08-04 ENCOUNTER — OFFICE VISIT (OUTPATIENT)
Dept: PODIATRY | Facility: OTHER | Age: 86
End: 2021-08-04
Attending: PODIATRIST
Payer: MEDICARE

## 2021-08-04 VITALS
BODY MASS INDEX: 31.7 KG/M2 | OXYGEN SATURATION: 93 % | HEIGHT: 69 IN | WEIGHT: 214 LBS | SYSTOLIC BLOOD PRESSURE: 128 MMHG | TEMPERATURE: 97.1 F | DIASTOLIC BLOOD PRESSURE: 58 MMHG | HEART RATE: 62 BPM

## 2021-08-04 DIAGNOSIS — I83.12 VARICOSE VEINS OF BOTH LOWER EXTREMITIES WITH INFLAMMATION: ICD-10-CM

## 2021-08-04 DIAGNOSIS — E11.42 DIABETIC POLYNEUROPATHY ASSOCIATED WITH TYPE 2 DIABETES MELLITUS (H): ICD-10-CM

## 2021-08-04 DIAGNOSIS — E11.9 DIABETES MELLITUS TYPE 2, NONINSULIN DEPENDENT (H): ICD-10-CM

## 2021-08-04 DIAGNOSIS — Z86.31 PERSONAL HISTORY OF DIABETIC FOOT ULCER: ICD-10-CM

## 2021-08-04 DIAGNOSIS — R20.8 LOSS OF PROTECTIVE SENSATION OF SKIN OF DEFORMED FOOT: ICD-10-CM

## 2021-08-04 DIAGNOSIS — N18.31 STAGE 3A CHRONIC KIDNEY DISEASE (H): ICD-10-CM

## 2021-08-04 DIAGNOSIS — L60.3 ONYCHODYSTROPHY: Primary | ICD-10-CM

## 2021-08-04 DIAGNOSIS — M21.969 LOSS OF PROTECTIVE SENSATION OF SKIN OF DEFORMED FOOT: ICD-10-CM

## 2021-08-04 DIAGNOSIS — I83.11 VARICOSE VEINS OF BOTH LOWER EXTREMITIES WITH INFLAMMATION: ICD-10-CM

## 2021-08-04 DIAGNOSIS — I78.1 TELANGIECTASIAS: ICD-10-CM

## 2021-08-04 DIAGNOSIS — Z89.431 STATUS POST PARTIAL AMPUTATION OF FOOT, RIGHT (H): ICD-10-CM

## 2021-08-04 DIAGNOSIS — M14.671 CHARCOT'S JOINT OF RIGHT FOOT: ICD-10-CM

## 2021-08-04 PROCEDURE — 99212 OFFICE O/P EST SF 10 MIN: CPT | Mod: 25 | Performed by: PODIATRIST

## 2021-08-04 PROCEDURE — 11721 DEBRIDE NAIL 6 OR MORE: CPT | Performed by: PODIATRIST

## 2021-08-04 PROCEDURE — G0463 HOSPITAL OUTPT CLINIC VISIT: HCPCS | Mod: 25

## 2021-08-04 ASSESSMENT — PAIN SCALES - GENERAL: PAINLEVEL: NO PAIN (0)

## 2021-08-04 ASSESSMENT — MIFFLIN-ST. JEOR: SCORE: 1646.08

## 2021-08-04 NOTE — PROGRESS NOTES
"Chief complaint: Patient presents with:  Nails      History of Present Illness: This 85 year old male is seen for follow-up management of his RIGHT plantar diabetic foot ulcer. He is no longer applying a pad to the previous ulcer site every day because it has not been open. He is also consistently wearing his DM shoes with offloading inserts. He says he has two pairs of offloading inserts with one pair in his DM shoes and one pair in his boots. He says the ulcer has remained healed and there is nothing draining or open on his foot.    He says he still gets his usual burning, tingling, and numbness on his feet.    No further pedal complaints today.     Patient does not use tobacco products.     Last HbA1C was 6.0% on 01/25/2021.    ---Previously 6.2% on 07/22/2020.    ---Previously 6.0% on 01/13/2020.      -----------------------------------------------------    Historically, patient recently had an I&D with a partial first ray amputation of the RIGHT foot on 07/15/2020.        /58   Pulse 62   Temp 97.1  F (36.2  C) (Tympanic)   Ht 1.753 m (5' 9\")   Wt 97.1 kg (214 lb)   SpO2 93%   BMI 31.60 kg/m      Patient Active Problem List   Diagnosis     Diabetic foot infection (H)     Abdominal aortic aneurysm (AAA) without rupture (H)     Benign non-nodular prostatic hyperplasia with lower urinary tract symptoms     Diabetic polyneuropathy associated with type 2 diabetes mellitus (H)     Hyperlipidemia associated with type 2 diabetes mellitus (H)     Hypertension associated with diabetes (H)     PAD (peripheral artery disease) (H)     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)     Research exam     Statin intolerance     Type 2 diabetes mellitus with stage 3 chronic kidney disease, without long-term current use of insulin (H)       Past Surgical History:   Procedure Laterality Date     APPENDECTOMY       EYE SURGERY Right     lens implant     INCISION AND DRAINAGE FOOT, COMBINED Right 7/15/2020    Procedure: " Right foot incision and drainage with first ray amputation;  Surgeon: Erin Harris DPM;  Location: [facility]     JOINT REPLACEMENT Right      TONSILLECTOMY         Current Outpatient Medications   Medication     allopurinol (ZYLOPRIM) 100 MG tablet     Apoaequorin (PREVAGEN EXTRA STRENGTH PO)     aspirin (ASA) 81 MG chewable tablet     atenolol (TENORMIN) 25 MG tablet     calcium carbonate (OS-FADY 600 MG [place]. [place]) 600 MG tablet     fluocinonide (LIDEX) 0.05 % external ointment     losartan (COZAAR) 25 MG tablet     Magnesium Oxide 500 MG TABS     Multiple Vitamin (MULTIVITAMINS PO)     naproxen sodium (ANAPROX) 220 MG tablet     pravastatin (PRAVACHOL) 10 MG tablet     tamsulosin (FLOMAX) 0.4 MG capsule     amoxicillin (AMOXIL) 500 MG capsule     No current facility-administered medications for this visit.          Allergies   Allergen Reactions     Flu Virus Vaccine Anaphylaxis     Altace [Ramipril] Other (See Comments)     Dizziness       Ciprofloxacin Diarrhea     Hmg-Coa-R Inhibitors Muscle Pain (Myalgia)     Statin Intolerance Documentation  1. Unable to tolerate at least 2 statins: Crestor at the lowest starting daily dose and Zocor at any daily dose, due to reported symptoms which are temporally related to statin treatment and reversible upon statin discontinuation and other causes have been excluded.     Lisinopril Other (See Comments)     Dizziness       Levaquin [Levofloxacin] Unknown     Sulfa Drugs Unknown     Celebrex [Celecoxib] Rash     Hydrocodone Rash     Niacin Itching       Family History   Problem Relation Age of Onset     Cancer No family hx of        Social History     Socioeconomic History     Marital status:      Spouse name: Not on file     Number of children: Not on file     Years of education: Not on file     Highest education level: Not on file   Occupational History     Not on file   Social Needs     Financial resource strain: Not on file     Food insecurity     Worry: Not on  file     Inability: Not on file     Transportation needs     Medical: Not on file     Non-medical: Not on file   Tobacco Use     Smoking status: Former Smoker     Years: 50.00     Types: Cigarettes     Last attempt to quit: 2001     Years since quittin.5   Substance and Sexual Activity     Alcohol use: No     Drug use: Not on file     Sexual activity: Not on file   Lifestyle     Physical activity     Days per week: Not on file     Minutes per session: Not on file     Stress: Not on file   Relationships     Social connections     Talks on phone: Not on file     Gets together: Not on file     Attends [employer] service: Not on file     Active member of club or organization: Not on file     Attends meetings of clubs or organizations: Not on file     Relationship status: Not on file     Intimate partner violence     Fear of current or ex partner: Not on file     Emotionally abused: Not on file     Physically abused: Not on file     Forced sexual activity: Not on file   Other Topics Concern     Parent/sibling w/ CABG, MI or angioplasty before 65F 55M? Not Asked   Social History Narrative     Not on file       ROS: 10 point ROS neg other than the symptoms noted above in the HPI.  EXAM  Constitutional: healthy, alert and no distress    Psychiatric: mentation appears normal and affect normal/bright    RIGHT FOOT FOCUSED    VASCULAR:  -Dorsalis pedis pulse +2/4   -Posterior tibial pulse +1/4   -Capillary refill time < 3 seconds to  hallux  -Hair growth Absent to anterior leg and ankle  -Varicosities and telangiectasias to foot  -Mild 1+ pitting edema to RIGHT foot  NEURO:  -Light touch sensation diminished to RIGHT plantar forefoot  -Protective sensation diminished with SWM +1/10 RIGHT and +2/10 LEFT on 2021  -Protective sensation diminished with SWM +1/10 RIGHT and +0/10 LEFT on 2020  DERM:  -Skin temperature, texture and turgor WNL bilaterally   -Toenails elongated, thickened, dystrophic and discolored  x 9  -Cicatrix to RIGHT dorsal first ray -- healed with no open wounds      MSK:  -No Pain to the RIGHT foot  -Mild rocker botton deformity to the plantar lateral foot  -Bony prominence beneath RIGHT foot plantar medial ulceration near base of the first metatarsal   -Muscle strength of ankles +5/5 for dorsiflexion, plantarflexion, ABDUction and ADDuction b/l  ============================================================    ASSESSMENT:    (L60.3) Onychodystrophy  (primary encounter diagnosis)    (M14.671) Charcot's joint of right foot  (primary encounter diagnosis)    (I83.11,  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 Treatment options discussed with no educational barriers noted.  -High risk toenail debridement x 9 toenails without incident  -No calluses that required paring today  -Diabetic Foot Education provided. This included checking the feet daily looking for new new blisters or wounds, wearing shoes at all times when walking including around the house, and avoiding lotion application between the toes. If there are any signs of infection, the patient should present to the ED as soon as possible. Infections of the foot can be life threatening or lead to amputations of the foot or leg.    -DM shoes: Patient has an upcoming appointment with his PCP at the end of August, 2021, and he will discuss a DM shoe referral with his PCP.     -Patient in agreement with the above treatment plan and all of patient's questions were answered.      -Follow-up in  63+ days for diabetic foot exam and high risk nail debridement       Erin Harris, ROMAINM

## 2021-08-04 NOTE — NURSING NOTE
"Chief Complaint   Patient presents with     Nails       Initial /58   Pulse 62   Temp 97.1  F (36.2  C) (Tympanic)   Ht 1.753 m (5' 9\")   Wt 97.1 kg (214 lb)   SpO2 93%   BMI 31.60 kg/m   Estimated body mass index is 31.6 kg/m  as calculated from the following:    Height as of this encounter: 1.753 m (5' 9\").    Weight as of this encounter: 97.1 kg (214 lb).  Medication Reconciliation: complete  Norma Salvador LPN    "

## 2021-10-06 ENCOUNTER — OFFICE VISIT (OUTPATIENT)
Dept: PODIATRY | Facility: OTHER | Age: 86
End: 2021-10-06
Attending: PODIATRIST
Payer: MEDICARE

## 2021-10-06 VITALS
DIASTOLIC BLOOD PRESSURE: 62 MMHG | SYSTOLIC BLOOD PRESSURE: 132 MMHG | HEART RATE: 80 BPM | OXYGEN SATURATION: 97 % | TEMPERATURE: 96.8 F

## 2021-10-06 DIAGNOSIS — M14.671 CHARCOT'S JOINT OF RIGHT FOOT: ICD-10-CM

## 2021-10-06 DIAGNOSIS — I78.1 TELANGIECTASIAS: ICD-10-CM

## 2021-10-06 DIAGNOSIS — I83.12 VARICOSE VEINS OF BOTH LOWER EXTREMITIES WITH INFLAMMATION: ICD-10-CM

## 2021-10-06 DIAGNOSIS — R20.8 LOSS OF PROTECTIVE SENSATION OF SKIN OF DEFORMED FOOT: ICD-10-CM

## 2021-10-06 DIAGNOSIS — Z89.431 STATUS POST PARTIAL AMPUTATION OF FOOT, RIGHT (H): ICD-10-CM

## 2021-10-06 DIAGNOSIS — Z86.31 PERSONAL HISTORY OF DIABETIC FOOT ULCER: ICD-10-CM

## 2021-10-06 DIAGNOSIS — L60.3 ONYCHODYSTROPHY: Primary | ICD-10-CM

## 2021-10-06 DIAGNOSIS — N18.31 STAGE 3A CHRONIC KIDNEY DISEASE (H): ICD-10-CM

## 2021-10-06 DIAGNOSIS — E11.9 DIABETES MELLITUS TYPE 2, NONINSULIN DEPENDENT (H): ICD-10-CM

## 2021-10-06 DIAGNOSIS — I83.11 VARICOSE VEINS OF BOTH LOWER EXTREMITIES WITH INFLAMMATION: ICD-10-CM

## 2021-10-06 DIAGNOSIS — E11.42 DIABETIC POLYNEUROPATHY ASSOCIATED WITH TYPE 2 DIABETES MELLITUS (H): ICD-10-CM

## 2021-10-06 DIAGNOSIS — M21.969 LOSS OF PROTECTIVE SENSATION OF SKIN OF DEFORMED FOOT: ICD-10-CM

## 2021-10-06 PROCEDURE — 99212 OFFICE O/P EST SF 10 MIN: CPT | Mod: 25 | Performed by: PODIATRIST

## 2021-10-06 PROCEDURE — 11721 DEBRIDE NAIL 6 OR MORE: CPT | Performed by: PODIATRIST

## 2021-10-06 PROCEDURE — G0463 HOSPITAL OUTPT CLINIC VISIT: HCPCS | Mod: 25

## 2021-10-06 ASSESSMENT — PAIN SCALES - GENERAL: PAINLEVEL: NO PAIN (0)

## 2021-10-06 NOTE — NURSING NOTE
"Chief Complaint   Patient presents with     Toenail       Initial /62 (BP Location: Left arm, Patient Position: Sitting, Cuff Size: Adult Regular)   Pulse 80   Temp 96.8  F (36  C) (Tympanic)   SpO2 97%  Estimated body mass index is 31.6 kg/m  as calculated from the following:    Height as of 8/4/21: 1.753 m (5' 9\").    Weight as of 8/4/21: 97.1 kg (214 lb).  Medication Reconciliation: tyshawn Mera  "

## 2021-10-06 NOTE — PROGRESS NOTES
Chief complaint: Patient presents with:  Toenail      History of Present Illness: This 85 year old male is seen for a diabetic foot exam and high risk nail debridement. He has not been checking his feet every day. He has not had any concerns with his feet.     He is also consistently wearing his DM shoes with offloading inserts. He says he has two pairs of offloading inserts with one pair in his DM shoes and one pair in his boots.     He says he still gets his usual burning, tingling, and numbness on his feet, but it is not enough to cause pain at night so much as it is just noticeable.     No further pedal complaints today.     Patient does not use tobacco products.     Last HbA1C was 5.9% on 08/30/2021.    ---Previously 6.0% on 01/25/2021.    ---Previously 6.2% on 07/22/2020.    ---Previously 6.0% on 01/13/2020.      -----------------------------------------------------    Historically, patient recently had an I&D with a partial first ray amputation of the RIGHT foot on 07/15/2020.        /62 (BP Location: Left arm, Patient Position: Sitting, Cuff Size: Adult Regular)   Pulse 80   Temp 96.8  F (36  C) (Tympanic)   SpO2 97%     Patient Active Problem List   Diagnosis     Diabetic foot infection (H)     Abdominal aortic aneurysm (AAA) without rupture (H)     Benign non-nodular prostatic hyperplasia with lower urinary tract symptoms     Diabetic polyneuropathy associated with type 2 diabetes mellitus (H)     Hyperlipidemia associated with type 2 diabetes mellitus (H)     Hypertension associated with diabetes (H)     PAD (peripheral artery disease) (H)     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)     Research exam     Statin intolerance     Type 2 diabetes mellitus with stage 3 chronic kidney disease, without long-term current use of insulin (H)       Past Surgical History:   Procedure Laterality Date     APPENDECTOMY       EYE SURGERY Right     lens implant     INCISION AND DRAINAGE FOOT, COMBINED Right  7/15/2020    Procedure: Right foot incision and drainage with first ray amputation;  Surgeon: Erin Harris DPM;  Location: [facility]     JOINT REPLACEMENT Right      TONSILLECTOMY         Current Outpatient Medications   Medication     allopurinol (ZYLOPRIM) 100 MG tablet     amoxicillin (AMOXIL) 500 MG capsule     Apoaequorin (PREVAGEN EXTRA STRENGTH PO)     aspirin (ASA) 81 MG chewable tablet     atenolol (TENORMIN) 25 MG tablet     calcium carbonate (OS-FADY 600 MG [place]. [place]) 600 MG tablet     fluocinonide (LIDEX) 0.05 % external ointment     losartan (COZAAR) 25 MG tablet     Magnesium Oxide 500 MG TABS     Multiple Vitamin (MULTIVITAMINS PO)     naproxen sodium (ANAPROX) 220 MG tablet     pravastatin (PRAVACHOL) 10 MG tablet     tamsulosin (FLOMAX) 0.4 MG capsule     No current facility-administered medications for this visit.          Allergies   Allergen Reactions     Flu Virus Vaccine Anaphylaxis     Altace [Ramipril] Other (See Comments)     Dizziness       Ciprofloxacin Diarrhea     Hmg-Coa-R Inhibitors Muscle Pain (Myalgia)     Statin Intolerance Documentation  1. Unable to tolerate at least 2 statins: Crestor at the lowest starting daily dose and Zocor at any daily dose, due to reported symptoms which are temporally related to statin treatment and reversible upon statin discontinuation and other causes have been excluded.     Lisinopril Other (See Comments)     Dizziness       Levaquin [Levofloxacin] Unknown     Sulfa Drugs Unknown     Celebrex [Celecoxib] Rash     Hydrocodone Rash     Niacin Itching       Family History   Problem Relation Age of Onset     Cancer No family hx of        Social History     Socioeconomic History     Marital status:      Spouse name: Not on file     Number of children: Not on file     Years of education: Not on file     Highest education level: Not on file   Occupational History     Not on file   Social Needs     Financial resource strain: Not on file     Food  insecurity     Worry: Not on file     Inability: Not on file     Transportation needs     Medical: Not on file     Non-medical: Not on file   Tobacco Use     Smoking status: Former Smoker     Years: 50.00     Types: Cigarettes     Last attempt to quit: 2001     Years since quittin.5   Substance and Sexual Activity     Alcohol use: No     Drug use: Not on file     Sexual activity: Not on file   Lifestyle     Physical activity     Days per week: Not on file     Minutes per session: Not on file     Stress: Not on file   Relationships     Social connections     Talks on phone: Not on file     Gets together: Not on file     Attends Mor[employer] service: Not on file     Active member of club or organization: Not on file     Attends meetings of clubs or organizations: Not on file     Relationship status: Not on file     Intimate partner violence     Fear of current or ex partner: Not on file     Emotionally abused: Not on file     Physically abused: Not on file     Forced sexual activity: Not on file   Other Topics Concern     Parent/sibling w/ CABG, MI or angioplasty before 65F 55M? Not Asked   Social History Narrative     Not on file       ROS: 10 point ROS neg other than the symptoms noted above in the HPI.  EXAM  Constitutional: healthy, alert and no distress    Psychiatric: mentation appears normal and affect normal/bright    VASCULAR:  -Dorsalis pedis pulse +2/4 bilaterally   -Posterior tibial pulse +1/4 bilaterally   -Capillary refill time < 3 seconds to  Hallux bilaterally   -Hair growth Absent to anterior leg and ankle bilaterally   -Varicosities and telangiectasias to foot bilaterally   -Minimal-to-no remaining edema on the RIGHT foot  NEURO:  -Light touch sensation diminished to RIGHT plantar forefoot  -Protective sensation diminished with SWM +1/10 RIGHT and +2/10 LEFT on 2021  -Protective sensation diminished with SWM +1/10 RIGHT and +0/10 LEFT on 2020  DERM:  -Skin temperature, texture and  turgor WNL bilaterally   -Toenails elongated, thickened, dystrophic and discolored x 9  -Cicatrix to RIGHT dorsal first ray -- healed with no open wounds  -Minimal scar to the RIGHT plantar mid first ray with no open wounds and no hyperkeratotic lesion       MSK:  -No Pain to the RIGHT foot  -Mild rocker botton deformity to the plantar lateral foot  -Bony prominence beneath RIGHT foot plantar medial ulceration near base of the first metatarsal   -Muscle strength of ankles +5/5 for dorsiflexion, plantarflexion, ABDUction and ADDuction b/l  ============================================================    ASSESSMENT:    (L60.3) Onychodystrophy  (primary encounter diagnosis)    (M14.671) Charcot's joint of right foot  (primary encounter diagnosis)    (I83.11,  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 Treatment options discussed with no educational barriers noted.  -High risk toenail debridement x 9 toenails without incident  -No calluses that required paring today  -Diabetic Foot Education provided. This included checking the feet daily looking for new new blisters or wounds, wearing shoes at all times when walking including around the house, and avoiding lotion application between the toes. If there are any signs of infection, the patient should present to the ED as soon as possible. Infections of the foot can be life threatening or lead to amputations of the foot or leg.    -DM shoes: Patient had an appointment with his PCP at the end of August, 2021,  to discuss a DM shoe referral with his PCP.     -Patient in agreement with the above treatment plan and all of patient's questions were answered.      -Follow-up in 63+ days for diabetic foot exam and high risk nail debridement       Erin Harris DPM

## 2021-12-15 NOTE — PROGRESS NOTES
Chief complaint: Patient presents with:  Toenail: Trimming      History of Present Illness: This 85 year old male is seen for a diabetic foot exam and high risk nail debridement. He has not been checking his feet every day. He has not had any concerns with his feet.     He is also consistently wearing his DM shoes with offloading inserts. He says he has two pairs of offloading inserts with one pair in his DM shoes and one pair in his boots. He thinks he is due for another pair of DM shoes.    He says he still gets his usual burning, tingling, and numbness on his feet, but it is not enough to cause pain at night so much as it is just noticeable.     No further pedal complaints today.     Patient does not use tobacco products.     Last HbA1C was 5.9% on 08/30/2021.    ---Previously 6.0% on 01/25/2021.    ---Previously 6.2% on 07/22/2020.    ---Previously 6.0% on 01/13/2020.      -----------------------------------------------------    Historically, patient recently had an I&D with a partial first ray amputation of the RIGHT foot on 07/15/2020.        /75 (BP Location: Left arm, Patient Position: Sitting, Cuff Size: Adult Regular)   Pulse 68   Temp 98.2  F (36.8  C) (Tympanic)   SpO2 90%     Patient Active Problem List   Diagnosis     Diabetic foot infection (H)     Abdominal aortic aneurysm (AAA) without rupture (H)     Benign non-nodular prostatic hyperplasia with lower urinary tract symptoms     Diabetic polyneuropathy associated with type 2 diabetes mellitus (H)     Hyperlipidemia associated with type 2 diabetes mellitus (H)     Hypertension associated with diabetes (H)     PAD (peripheral artery disease) (H)     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)     Research exam     Statin intolerance     Type 2 diabetes mellitus with stage 3 chronic kidney disease, without long-term current use of insulin (H)       Past Surgical History:   Procedure Laterality Date     APPENDECTOMY       EYE SURGERY Right      lens implant     INCISION AND DRAINAGE FOOT, COMBINED Right 7/15/2020    Procedure: Right foot incision and drainage with first ray amputation;  Surgeon: Erin Harris DPM;  Location: [facility]     JOINT REPLACEMENT Right      TONSILLECTOMY         Current Outpatient Medications   Medication     allopurinol (ZYLOPRIM) 100 MG tablet     amoxicillin (AMOXIL) 500 MG capsule     Apoaequorin (PREVAGEN EXTRA STRENGTH PO)     aspirin (ASA) 81 MG chewable tablet     atenolol (TENORMIN) 25 MG tablet     calcium carbonate (OS-FADY 600 MG [place]. [place]) 600 MG tablet     fluocinonide (LIDEX) 0.05 % external ointment     losartan (COZAAR) 25 MG tablet     Magnesium Oxide 500 MG TABS     Multiple Vitamin (MULTIVITAMINS PO)     naproxen sodium (ANAPROX) 220 MG tablet     pravastatin (PRAVACHOL) 10 MG tablet     tamsulosin (FLOMAX) 0.4 MG capsule     No current facility-administered medications for this visit.          Allergies   Allergen Reactions     Flu Virus Vaccine Anaphylaxis     Altace [Ramipril] Other (See Comments)     Dizziness       Ciprofloxacin Diarrhea     Hmg-Coa-R Inhibitors Muscle Pain (Myalgia)     Statin Intolerance Documentation  1. Unable to tolerate at least 2 statins: Crestor at the lowest starting daily dose and Zocor at any daily dose, due to reported symptoms which are temporally related to statin treatment and reversible upon statin discontinuation and other causes have been excluded.     Lisinopril Other (See Comments)     Dizziness       Levaquin [Levofloxacin] Unknown     Sulfa Drugs Unknown     Celebrex [Celecoxib] Rash     Hydrocodone Rash     Niacin Itching       Family History   Problem Relation Age of Onset     Cancer No family hx of        Social History     Socioeconomic History     Marital status:      Spouse name: Not on file     Number of children: Not on file     Years of education: Not on file     Highest education level: Not on file   Occupational History     Not on file   Social  Needs     Financial resource strain: Not on file     Food insecurity     Worry: Not on file     Inability: Not on file     Transportation needs     Medical: Not on file     Non-medical: Not on file   Tobacco Use     Smoking status: Former Smoker     Years: 50.00     Types: Cigarettes     Last attempt to quit: 2001     Years since quittin.5   Substance and Sexual Activity     Alcohol use: No     Drug use: Not on file     Sexual activity: Not on file   Lifestyle     Physical activity     Days per week: Not on file     Minutes per session: Not on file     Stress: Not on file   Relationships     Social connections     Talks on phone: Not on file     Gets together: Not on file     Attends Shinto service: Not on file     Active member of club or organization: Not on file     Attends meetings of clubs or organizations: Not on file     Relationship status: Not on file     Intimate partner violence     Fear of current or ex partner: Not on file     Emotionally abused: Not on file     Physically abused: Not on file     Forced sexual activity: Not on file   Other Topics Concern     Parent/sibling w/ CABG, MI or angioplasty before 65F 55M? Not Asked   Social History Narrative     Not on file       ROS: 10 point ROS neg other than the symptoms noted above in the HPI.  EXAM  Constitutional: healthy, alert and no distress    Psychiatric: mentation appears normal and affect normal/bright    VASCULAR:  -Dorsalis pedis pulse +2/4 bilaterally   -Posterior tibial pulse +1/4 bilaterally   -Capillary refill time < 3 seconds to  Hallux bilaterally   -Hair growth Absent to anterior leg and ankle bilaterally   -Varicosities and telangiectasias to foot bilaterally   -Minimal-to-no remaining edema on the RIGHT foot  NEURO:  -Protective sensation diminished with SWM +0/10 RIGHT and +1/10 LEFT on 12/15/2021  -Protective sensation diminished with SWM +1/10 RIGHT and +2/10 LEFT on 2021  -Protective sensation diminished with SWM  +1/10 RIGHT and +0/10 LEFT on 11/04/2020  DERM:  -Skin temperature, texture and turgor WNL bilaterally   -Toenails elongated, thickened, dystrophic and discolored x 9  -Cicatrix to RIGHT dorsal first ray -- healed with no open wounds  -Minimal scar to the RIGHT plantar mid first ray with no open wounds and no hyperkeratotic lesion       MSK:  -No Pain to the RIGHT foot  -Mild rocker botton deformity to the plantar lateral foot  -Bony prominence beneath RIGHT foot plantar medial ulceration near base of the first metatarsal   -Muscle strength of ankles +5/5 for dorsiflexion, plantarflexion, ABDUction and ADDuction b/l  ============================================================    ASSESSMENT:    (L60.3) Onychodystrophy  (primary encounter diagnosis)    (M14.671) Charcot's joint of right foot  (primary encounter diagnosis)    (I83.11,  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 Treatment options discussed with no educational barriers noted.  -High risk toenail debridement x 9 toenails without incident (RIGHT hallux amputated)    -Diabetic Foot Education provided. This included checking the feet daily looking for new new blisters or wounds, wearing shoes at all times when walking including around the house, and avoiding lotion application between the toes. If there are any signs of infection, the patient should present to the ED as soon as possible. Infections of the foot can be life threatening or lead to amputations of the foot or leg.    -DM shoes: New  referral placed through the orthotist, Jo Ann Carranza, on 12/16/2021. Will try to schedule this following patient's podiatry appointment at his follow-up      -Patient in agreement with the above treatment plan and all of patient's questions were answered.      -Follow-up in 63+ days for diabetic foot exam and high risk nail debridement with appointment to follow with the orthotist, Jo Ann Carranza, for DM shoes      Erin Harris DPM

## 2021-12-15 NOTE — NURSING NOTE
"Chief Complaint   Patient presents with     Toenail     Trimming       Initial /75 (BP Location: Left arm, Patient Position: Sitting, Cuff Size: Adult Regular)   Pulse 68   Temp 98.2  F (36.8  C) (Tympanic)   SpO2 90%  Estimated body mass index is 31.6 kg/m  as calculated from the following:    Height as of 8/4/21: 1.753 m (5' 9\").    Weight as of 8/4/21: 97.1 kg (214 lb).  Medication Reconciliation: tyshawn Mera  "

## 2022-01-01 ENCOUNTER — ANCILLARY PROCEDURE (OUTPATIENT)
Dept: GENERAL RADIOLOGY | Facility: OTHER | Age: 87
End: 2022-01-01
Attending: SPECIALIST
Payer: MEDICARE

## 2022-01-01 ENCOUNTER — OFFICE VISIT (OUTPATIENT)
Dept: PODIATRY | Facility: OTHER | Age: 87
End: 2022-01-01
Attending: PODIATRIST
Payer: MEDICARE

## 2022-01-01 ENCOUNTER — OFFICE VISIT (OUTPATIENT)
Dept: FAMILY MEDICINE | Facility: OTHER | Age: 87
End: 2022-01-01
Attending: NURSE PRACTITIONER
Payer: COMMERCIAL

## 2022-01-01 ENCOUNTER — ANCILLARY PROCEDURE (OUTPATIENT)
Dept: GENERAL RADIOLOGY | Facility: OTHER | Age: 87
End: 2022-01-01
Attending: PODIATRIST
Payer: MEDICARE

## 2022-01-01 ENCOUNTER — TELEPHONE (OUTPATIENT)
Dept: FAMILY MEDICINE | Facility: OTHER | Age: 87
End: 2022-01-01

## 2022-01-01 ENCOUNTER — OFFICE VISIT (OUTPATIENT)
Dept: ORTHOPEDICS | Facility: OTHER | Age: 87
End: 2022-01-01
Attending: NURSE PRACTITIONER
Payer: COMMERCIAL

## 2022-01-01 ENCOUNTER — OFFICE VISIT (OUTPATIENT)
Dept: PODIATRY | Facility: OTHER | Age: 87
End: 2022-01-01
Attending: PODIATRIST
Payer: COMMERCIAL

## 2022-01-01 ENCOUNTER — OFFICE VISIT (OUTPATIENT)
Dept: FAMILY MEDICINE | Facility: OTHER | Age: 87
End: 2022-01-01
Attending: NURSE PRACTITIONER
Payer: MEDICARE

## 2022-01-01 VITALS
HEIGHT: 69 IN | BODY MASS INDEX: 31.13 KG/M2 | TEMPERATURE: 97.3 F | RESPIRATION RATE: 18 BRPM | OXYGEN SATURATION: 95 % | DIASTOLIC BLOOD PRESSURE: 72 MMHG | WEIGHT: 210.2 LBS | SYSTOLIC BLOOD PRESSURE: 138 MMHG | HEART RATE: 45 BPM

## 2022-01-01 VITALS
BODY MASS INDEX: 31.18 KG/M2 | WEIGHT: 210.5 LBS | DIASTOLIC BLOOD PRESSURE: 60 MMHG | HEART RATE: 63 BPM | HEIGHT: 69 IN | TEMPERATURE: 99 F | SYSTOLIC BLOOD PRESSURE: 130 MMHG | RESPIRATION RATE: 18 BRPM | OXYGEN SATURATION: 93 %

## 2022-01-01 VITALS
HEART RATE: 58 BPM | SYSTOLIC BLOOD PRESSURE: 122 MMHG | OXYGEN SATURATION: 90 % | RESPIRATION RATE: 20 BRPM | DIASTOLIC BLOOD PRESSURE: 68 MMHG | TEMPERATURE: 96.9 F

## 2022-01-01 VITALS
BODY MASS INDEX: 31.1 KG/M2 | WEIGHT: 210 LBS | OXYGEN SATURATION: 93 % | RESPIRATION RATE: 15 BRPM | HEIGHT: 69 IN | SYSTOLIC BLOOD PRESSURE: 138 MMHG | DIASTOLIC BLOOD PRESSURE: 78 MMHG | TEMPERATURE: 97.1 F | HEART RATE: 55 BPM

## 2022-01-01 VITALS
SYSTOLIC BLOOD PRESSURE: 128 MMHG | OXYGEN SATURATION: 94 % | BODY MASS INDEX: 31.55 KG/M2 | WEIGHT: 213 LBS | HEIGHT: 69 IN | HEART RATE: 72 BPM | DIASTOLIC BLOOD PRESSURE: 70 MMHG | RESPIRATION RATE: 16 BRPM

## 2022-01-01 VITALS
DIASTOLIC BLOOD PRESSURE: 68 MMHG | OXYGEN SATURATION: 95 % | WEIGHT: 215.4 LBS | SYSTOLIC BLOOD PRESSURE: 150 MMHG | HEART RATE: 54 BPM | TEMPERATURE: 98.5 F | BODY MASS INDEX: 31.9 KG/M2 | RESPIRATION RATE: 20 BRPM | HEIGHT: 69 IN

## 2022-01-01 VITALS
SYSTOLIC BLOOD PRESSURE: 135 MMHG | OXYGEN SATURATION: 91 % | DIASTOLIC BLOOD PRESSURE: 82 MMHG | TEMPERATURE: 98.1 F | HEART RATE: 77 BPM

## 2022-01-01 VITALS
WEIGHT: 210.5 LBS | SYSTOLIC BLOOD PRESSURE: 138 MMHG | DIASTOLIC BLOOD PRESSURE: 74 MMHG | TEMPERATURE: 97.7 F | BODY MASS INDEX: 31.9 KG/M2 | HEART RATE: 100 BPM | OXYGEN SATURATION: 91 % | HEIGHT: 68 IN

## 2022-01-01 DIAGNOSIS — M14.671 CHARCOT'S JOINT OF RIGHT FOOT: ICD-10-CM

## 2022-01-01 DIAGNOSIS — M21.969 LOSS OF PROTECTIVE SENSATION OF SKIN OF DEFORMED FOOT: ICD-10-CM

## 2022-01-01 DIAGNOSIS — I78.1 TELANGIECTASIAS: ICD-10-CM

## 2022-01-01 DIAGNOSIS — E11.22 TYPE 2 DIABETES MELLITUS WITH STAGE 3B CHRONIC KIDNEY DISEASE, WITHOUT LONG-TERM CURRENT USE OF INSULIN (H): Primary | ICD-10-CM

## 2022-01-01 DIAGNOSIS — Z89.431 STATUS POST PARTIAL AMPUTATION OF FOOT, RIGHT (H): ICD-10-CM
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
## 2022-01-01 DIAGNOSIS — E11.69 HYPERLIPIDEMIA ASSOCIATED WITH TYPE 2 DIABETES MELLITUS (H): ICD-10-CM

## 2022-01-01 DIAGNOSIS — L30.9 DERMATITIS: ICD-10-CM

## 2022-01-01 DIAGNOSIS — R79.9 ABNORMAL FINDING OF BLOOD CHEMISTRY, UNSPECIFIED: ICD-10-CM

## 2022-01-01 DIAGNOSIS — N18.32 TYPE 2 DIABETES MELLITUS WITH STAGE 3B CHRONIC KIDNEY DISEASE, WITHOUT LONG-TERM CURRENT USE OF INSULIN (H): Primary | ICD-10-CM

## 2022-01-01 DIAGNOSIS — Z79.899 ON STATIN THERAPY: ICD-10-CM

## 2022-01-01 DIAGNOSIS — E78.5 HYPERLIPIDEMIA ASSOCIATED WITH TYPE 2 DIABETES MELLITUS (H): ICD-10-CM

## 2022-01-01 DIAGNOSIS — I83.11 VARICOSE VEINS OF BOTH LOWER EXTREMITIES WITH INFLAMMATION: ICD-10-CM

## 2022-01-01 DIAGNOSIS — S90.32XA TRAUMATIC ECCHYMOSIS OF LEFT FOOT, INITIAL ENCOUNTER: ICD-10-CM

## 2022-01-01 DIAGNOSIS — S90.02XA: ICD-10-CM

## 2022-01-01 DIAGNOSIS — M1A.00X0 IDIOPATHIC CHRONIC GOUT WITHOUT TOPHUS, UNSPECIFIED SITE: ICD-10-CM

## 2022-01-01 DIAGNOSIS — M25.511 ACUTE SHOULDER PAIN DUE TO TRAUMA, RIGHT: ICD-10-CM

## 2022-01-01 DIAGNOSIS — N18.32 STAGE 3B CHRONIC KIDNEY DISEASE (H): ICD-10-CM
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
## 2022-01-01 DIAGNOSIS — L60.3 ONYCHODYSTROPHY: Primary | ICD-10-CM

## 2022-01-01 DIAGNOSIS — R25.2 BILATERAL LEG CRAMPS: ICD-10-CM

## 2022-01-01 DIAGNOSIS — N40.1 BENIGN NON-NODULAR PROSTATIC HYPERPLASIA WITH LOWER URINARY TRACT SYMPTOMS: Primary | ICD-10-CM

## 2022-01-01 DIAGNOSIS — E11.59 HYPERTENSION ASSOCIATED WITH DIABETES (H): ICD-10-CM

## 2022-01-01 DIAGNOSIS — E11.9 DIABETES MELLITUS TYPE 2, NONINSULIN DEPENDENT (H): ICD-10-CM

## 2022-01-01 DIAGNOSIS — I83.12 VARICOSE VEINS OF BOTH LOWER EXTREMITIES WITH INFLAMMATION: ICD-10-CM
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
## 2022-01-01 DIAGNOSIS — N18.31 STAGE 3A CHRONIC KIDNEY DISEASE (H): ICD-10-CM

## 2022-01-01 DIAGNOSIS — E11.42 DIABETIC POLYNEUROPATHY ASSOCIATED WITH TYPE 2 DIABETES MELLITUS (H): ICD-10-CM

## 2022-01-01 DIAGNOSIS — H61.23 EXCESSIVE CERUMEN IN EAR CANAL, BILATERAL: ICD-10-CM

## 2022-01-01 DIAGNOSIS — G89.11 ACUTE SHOULDER PAIN DUE TO TRAUMA, RIGHT: ICD-10-CM
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
## 2022-01-01 DIAGNOSIS — Z86.31 PERSONAL HISTORY OF DIABETIC FOOT ULCER: ICD-10-CM

## 2022-01-01 DIAGNOSIS — R73.03 PRE-DIABETES: ICD-10-CM

## 2022-01-01 DIAGNOSIS — E66.811 CLASS 1 OBESITY WITH SERIOUS COMORBIDITY AND BODY MASS INDEX (BMI) OF 31.0 TO 31.9 IN ADULT, UNSPECIFIED OBESITY TYPE: ICD-10-CM

## 2022-01-01 DIAGNOSIS — R53.83 FATIGUE, UNSPECIFIED TYPE: ICD-10-CM
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
## 2022-01-01 DIAGNOSIS — E78.5 HYPERLIPIDEMIA LDL GOAL <100: ICD-10-CM

## 2022-01-01 DIAGNOSIS — I73.9 PAD (PERIPHERAL ARTERY DISEASE) (H): ICD-10-CM

## 2022-01-01 DIAGNOSIS — R25.2 MUSCLE CRAMP: ICD-10-CM

## 2022-01-01 DIAGNOSIS — I15.2 HYPERTENSION ASSOCIATED WITH DIABETES (H): ICD-10-CM

## 2022-01-01 DIAGNOSIS — Z95.828 HISTORY OF ENDOVASCULAR STENT GRAFT FOR ABDOMINAL AORTIC ANEURYSM: ICD-10-CM

## 2022-01-01 DIAGNOSIS — I71.40 ABDOMINAL AORTIC ANEURYSM (AAA) WITHOUT RUPTURE (H): ICD-10-CM

## 2022-01-01 DIAGNOSIS — M25.511 SHOULDER PAIN, RIGHT: ICD-10-CM

## 2022-01-01 DIAGNOSIS — R20.8 LOSS OF PROTECTIVE SENSATION OF SKIN OF DEFORMED FOOT: ICD-10-CM

## 2022-01-01 DIAGNOSIS — I10 BENIGN ESSENTIAL HYPERTENSION: ICD-10-CM

## 2022-01-01 LAB
ALBUMIN SERPL BCG-MCNC: 3.9 G/DL (ref 3.5–5.2)
ALBUMIN SERPL-MCNC: 3.5 G/DL (ref 3.4–5)
ALP SERPL-CCNC: 132 U/L (ref 40–129)
ALP SERPL-CCNC: 140 U/L (ref 40–150)
ALT SERPL W P-5'-P-CCNC: 18 U/L (ref 10–50)
ALT SERPL W P-5'-P-CCNC: 26 U/L (ref 0–70)
ANION GAP SERPL CALCULATED.3IONS-SCNC: 10 MMOL/L (ref 7–15)
ANION GAP SERPL CALCULATED.3IONS-SCNC: 6 MMOL/L (ref 3–14)
AST SERPL W P-5'-P-CCNC: 28 U/L (ref 0–45)
AST SERPL W P-5'-P-CCNC: 29 U/L (ref 10–50)
BILIRUB SERPL-MCNC: 0.6 MG/DL (ref 0.2–1.3)
BILIRUB SERPL-MCNC: 0.7 MG/DL
BUN SERPL-MCNC: 22 MG/DL (ref 7–30)
BUN SERPL-MCNC: 44.6 MG/DL (ref 8–23)
CALCIUM SERPL-MCNC: 9.2 MG/DL (ref 8.8–10.2)
CALCIUM SERPL-MCNC: 9.7 MG/DL (ref 8.5–10.1)
CHLORIDE BLD-SCNC: 105 MMOL/L (ref 94–109)
CHLORIDE SERPL-SCNC: 103 MMOL/L (ref 98–107)
CHOLEST SERPL-MCNC: 170 MG/DL
CHOLEST SERPL-MCNC: 193 MG/DL
CO2 SERPL-SCNC: 29 MMOL/L (ref 20–32)
CREAT SERPL-MCNC: 1.29 MG/DL (ref 0.66–1.25)
CREAT SERPL-MCNC: 1.85 MG/DL (ref 0.67–1.17)
CREAT UR-MCNC: 89 MG/DL
DEPRECATED CALCIDIOL+CALCIFEROL SERPL-MC: 55 UG/L (ref 20–75)
DEPRECATED HCO3 PLAS-SCNC: 25 MMOL/L (ref 22–29)
ERYTHROCYTE [DISTWIDTH] IN BLOOD BY AUTOMATED COUNT: 14.2 % (ref 10–15)
ERYTHROCYTE [DISTWIDTH] IN BLOOD BY AUTOMATED COUNT: 15.2 % (ref 10–15)
EST. AVERAGE GLUCOSE BLD GHB EST-MCNC: 114 MG/DL
EST. AVERAGE GLUCOSE BLD GHB EST-MCNC: 114 MG/DL
FASTING STATUS PATIENT QL REPORTED: NO
FERRITIN SERPL-MCNC: 368 NG/ML (ref 26–388)
GFR SERPL CREATININE-BSD FRML MDRD: 35 ML/MIN/1.73M2
GFR SERPL CREATININE-BSD FRML MDRD: 54 ML/MIN/1.73M2
GLUCOSE BLD-MCNC: 115 MG/DL (ref 70–99)
GLUCOSE SERPL-MCNC: 130 MG/DL (ref 70–99)
HBA1C MFR BLD: 5.6 %
HBA1C MFR BLD: 5.6 % (ref 0–5.6)
HCT VFR BLD AUTO: 33.9 % (ref 40–53)
HCT VFR BLD AUTO: 37 % (ref 40–53)
HDLC SERPL-MCNC: 50 MG/DL
HDLC SERPL-MCNC: 65 MG/DL
HGB BLD-MCNC: 11.4 G/DL (ref 13.3–17.7)
HGB BLD-MCNC: 12.3 G/DL (ref 13.3–17.7)
LDLC SERPL CALC-MCNC: 107 MG/DL
LDLC SERPL CALC-MCNC: 111 MG/DL
MAGNESIUM SERPL-MCNC: 1.9 MG/DL (ref 1.6–2.3)
MAGNESIUM SERPL-MCNC: 2 MG/DL (ref 1.7–2.3)
MCH RBC QN AUTO: 33.2 PG (ref 26.5–33)
MCH RBC QN AUTO: 34 PG (ref 26.5–33)
MCHC RBC AUTO-ENTMCNC: 33.2 G/DL (ref 31.5–36.5)
MCHC RBC AUTO-ENTMCNC: 33.6 G/DL (ref 31.5–36.5)
MCV RBC AUTO: 102 FL (ref 78–100)
MCV RBC AUTO: 99 FL (ref 78–100)
MICROALBUMIN UR-MCNC: 1360 MG/L
MICROALBUMIN/CREAT UR: 1528.09 MG/G CR (ref 0–17)
NONHDLC SERPL-MCNC: 120 MG/DL
NONHDLC SERPL-MCNC: 128 MG/DL
PLATELET # BLD AUTO: 147 10E3/UL (ref 150–450)
PLATELET # BLD AUTO: 155 10E3/UL (ref 150–450)
POTASSIUM BLD-SCNC: 4.7 MMOL/L (ref 3.4–5.3)
POTASSIUM SERPL-SCNC: 5 MMOL/L (ref 3.4–5.3)
PROT SERPL-MCNC: 7.2 G/DL (ref 6.4–8.3)
PROT SERPL-MCNC: 7.3 G/DL (ref 6.8–8.8)
RBC # BLD AUTO: 3.43 10E6/UL (ref 4.4–5.9)
RBC # BLD AUTO: 3.62 10E6/UL (ref 4.4–5.9)
SODIUM SERPL-SCNC: 138 MMOL/L (ref 136–145)
SODIUM SERPL-SCNC: 140 MMOL/L (ref 133–144)
TRIGL SERPL-MCNC: 63 MG/DL
TRIGL SERPL-MCNC: 84 MG/DL
TSH SERPL DL<=0.005 MIU/L-ACNC: 0.53 MU/L (ref 0.4–4)
TSH SERPL DL<=0.005 MIU/L-ACNC: 0.59 UIU/ML (ref 0.3–4.2)
VIT B12 SERPL-MCNC: 612 PG/ML (ref 232–1245)
VIT B12 SERPL-MCNC: 732 PG/ML (ref 232–1245)
WBC # BLD AUTO: 4.2 10E3/UL (ref 4–11)
WBC # BLD AUTO: 6.9 10E3/UL (ref 4–11)

## 2022-01-01 PROCEDURE — 73630 X-RAY EXAM OF FOOT: CPT | Mod: TC,LT,FY

## 2022-01-01 PROCEDURE — 73610 X-RAY EXAM OF ANKLE: CPT | Mod: TC,LT,FY

## 2022-01-01 PROCEDURE — 99214 OFFICE O/P EST MOD 30 MIN: CPT | Performed by: NURSE PRACTITIONER

## 2022-01-01 PROCEDURE — G0463 HOSPITAL OUTPT CLINIC VISIT: HCPCS

## 2022-01-01 PROCEDURE — 85027 COMPLETE CBC AUTOMATED: CPT | Mod: ZL | Performed by: NURSE PRACTITIONER

## 2022-01-01 PROCEDURE — 36415 COLL VENOUS BLD VENIPUNCTURE: CPT | Mod: ZL | Performed by: NURSE PRACTITIONER

## 2022-01-01 PROCEDURE — 83036 HEMOGLOBIN GLYCOSYLATED A1C: CPT | Mod: ZL | Performed by: NURSE PRACTITIONER

## 2022-01-01 PROCEDURE — 82728 ASSAY OF FERRITIN: CPT | Mod: ZL | Performed by: NURSE PRACTITIONER

## 2022-01-01 PROCEDURE — 99213 OFFICE O/P EST LOW 20 MIN: CPT | Mod: 25 | Performed by: PODIATRIST

## 2022-01-01 PROCEDURE — 84443 ASSAY THYROID STIM HORMONE: CPT | Mod: ZL | Performed by: NURSE PRACTITIONER

## 2022-01-01 PROCEDURE — 99203 OFFICE O/P NEW LOW 30 MIN: CPT | Performed by: SPECIALIST

## 2022-01-01 PROCEDURE — G0463 HOSPITAL OUTPT CLINIC VISIT: HCPCS | Mod: 25

## 2022-01-01 PROCEDURE — 80061 LIPID PANEL: CPT | Mod: ZL | Performed by: NURSE PRACTITIONER

## 2022-01-01 PROCEDURE — 80053 COMPREHEN METABOLIC PANEL: CPT | Mod: ZL | Performed by: NURSE PRACTITIONER

## 2022-01-01 PROCEDURE — 11721 DEBRIDE NAIL 6 OR MORE: CPT | Performed by: PODIATRIST

## 2022-01-01 PROCEDURE — 73630 X-RAY EXAM OF FOOT: CPT | Mod: TC,RT,FY

## 2022-01-01 PROCEDURE — 82607 VITAMIN B-12: CPT | Mod: ZL | Performed by: NURSE PRACTITIONER

## 2022-01-01 PROCEDURE — 99204 OFFICE O/P NEW MOD 45 MIN: CPT | Performed by: NURSE PRACTITIONER

## 2022-01-01 PROCEDURE — 82043 UR ALBUMIN QUANTITATIVE: CPT | Mod: ZL | Performed by: NURSE PRACTITIONER

## 2022-01-01 PROCEDURE — 83735 ASSAY OF MAGNESIUM: CPT | Mod: ZL | Performed by: NURSE PRACTITIONER

## 2022-01-01 PROCEDURE — 73030 X-RAY EXAM OF SHOULDER: CPT | Mod: TC,RT,FY

## 2022-01-01 PROCEDURE — 82306 VITAMIN D 25 HYDROXY: CPT | Mod: ZL | Performed by: NURSE PRACTITIONER

## 2022-01-01 PROCEDURE — 73590 X-RAY EXAM OF LOWER LEG: CPT | Mod: TC,LT,FY

## 2022-01-01 PROCEDURE — 11721 DEBRIDE NAIL 6 OR MORE: CPT | Mod: GZ | Performed by: PODIATRIST

## 2022-01-01 RX ORDER — LOSARTAN POTASSIUM 50 MG/1
50 TABLET ORAL DAILY
Qty: 90 TABLET | Refills: 1 | Status: SHIPPED | OUTPATIENT
Start: 2022-01-01 | End: 2022-01-01

## 2022-01-01 RX ORDER — FLUOCINONIDE 0.5 MG/G
OINTMENT TOPICAL 2 TIMES DAILY
Qty: 60 G | Refills: 3 | Status: SHIPPED | OUTPATIENT
Start: 2022-01-01

## 2022-01-01 RX ORDER — LOSARTAN POTASSIUM 50 MG/1
TABLET ORAL
Qty: 90 TABLET | Refills: 1 | Status: SHIPPED | OUTPATIENT
Start: 2022-01-01

## 2022-01-01 RX ORDER — TAMSULOSIN HYDROCHLORIDE 0.4 MG/1
0.4 CAPSULE ORAL DAILY
Qty: 90 CAPSULE | Refills: 1 | Status: SHIPPED | OUTPATIENT
Start: 2022-01-01

## 2022-01-01 RX ORDER — ATENOLOL 25 MG/1
25 TABLET ORAL DAILY
Qty: 90 TABLET | Refills: 1 | COMMUNITY
Start: 2022-01-01

## 2022-01-01 RX ORDER — MULTIVIT-MIN/IRON/FOLIC ACID/K 18-600-40
CAPSULE ORAL
COMMUNITY

## 2022-01-01 ASSESSMENT — PAIN SCALES - GENERAL
PAINLEVEL: EXTREME PAIN (8)
PAINLEVEL: NO PAIN (0)

## 2022-01-01 ASSESSMENT — ANXIETY QUESTIONNAIRES
6. BECOMING EASILY ANNOYED OR IRRITABLE: NOT AT ALL
GAD7 TOTAL SCORE: 1
IF YOU CHECKED OFF ANY PROBLEMS ON THIS QUESTIONNAIRE, HOW DIFFICULT HAVE THESE PROBLEMS MADE IT FOR YOU TO DO YOUR WORK, TAKE CARE OF THINGS AT HOME, OR GET ALONG WITH OTHER PEOPLE: SOMEWHAT DIFFICULT
5. BEING SO RESTLESS THAT IT IS HARD TO SIT STILL: NOT AT ALL
GAD7 TOTAL SCORE: 1
3. WORRYING TOO MUCH ABOUT DIFFERENT THINGS: SEVERAL DAYS
4. TROUBLE RELAXING: NOT AT ALL
1. FEELING NERVOUS, ANXIOUS, OR ON EDGE: NOT AT ALL
7. FEELING AFRAID AS IF SOMETHING AWFUL MIGHT HAPPEN: NOT AT ALL
2. NOT BEING ABLE TO STOP OR CONTROL WORRYING: NOT AT ALL

## 2022-01-01 ASSESSMENT — PATIENT HEALTH QUESTIONNAIRE - PHQ9
SUM OF ALL RESPONSES TO PHQ QUESTIONS 1-9: 15
SUM OF ALL RESPONSES TO PHQ QUESTIONS 1-9: 13
SUM OF ALL RESPONSES TO PHQ QUESTIONS 1-9: 8
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
## 2022-02-16 NOTE — NURSING NOTE
"Chief Complaint   Patient presents with     Toenail     DFE       Initial /70 (BP Location: Left arm, Cuff Size: Adult Regular)   Pulse 72   Resp 16   Ht 1.753 m (5' 9\")   Wt 96.6 kg (213 lb)   SpO2 94%   BMI 31.45 kg/m   Estimated body mass index is 31.45 kg/m  as calculated from the following:    Height as of this encounter: 1.753 m (5' 9\").    Weight as of this encounter: 96.6 kg (213 lb).  Medication Reconciliation: complete  Chio Enamorado LPN  "

## 2022-03-30 PROBLEM — Z89.411: Status: ACTIVE | Noted: 2021-08-31
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
## 2022-04-01 NOTE — NURSING NOTE
"Chief Complaint   Patient presents with     Establish Care       Initial BP (!) 160/72 (BP Location: Right arm, Patient Position: Chair, Cuff Size: Adult Large)   Pulse 54   Temp 98.5  F (36.9  C) (Tympanic)   Resp 20   Ht 1.753 m (5' 9\")   Wt 97.7 kg (215 lb 6.4 oz)   SpO2 95%   BMI 31.81 kg/m   Estimated body mass index is 31.81 kg/m  as calculated from the following:    Height as of this encounter: 1.753 m (5' 9\").    Weight as of this encounter: 97.7 kg (215 lb 6.4 oz).  Medication Reconciliation: complete  Pamela M. Lechevalier, LPN    "

## 2022-04-01 NOTE — PATIENT INSTRUCTIONS
"  Assessment & Plan     1. Type 2 diabetes mellitus with stage 3b chronic kidney disease, without long-term current use of insulin (H)  Continue current plan     2. Hyperlipidemia associated with type 2 diabetes mellitus (H)  Continue pravachol weekly    3. Hypertension associated with diabetes (H)  Continue atenolol 12.5mg daily  Increase losartan to 50mg daily - new script sent in.      4. PAD (peripheral artery disease) (H)  Follow-up with vascular as scheduled.     5. Abdominal aortic aneurysm (AAA) without rupture (H)  As above    6. Dermatitis  - fluocinonide (LIDEX) 0.05 % external ointment; Apply topically 2 times daily to the worst spots of the affected area  Dispense: 60 g; Refill: 3    7. Excessive cerumen in ear canal, bilateral  TM intact after procedure, hearing improved.                 BMI:   Estimated body mass index is 31.81 kg/m  as calculated from the following:    Height as of this encounter: 1.753 m (5' 9\").    Weight as of this encounter: 97.7 kg (215 lb 6.4 oz).     Follow-up in three months or as needed     Nelia Abreu NP  [facility]      "

## 2022-05-04 NOTE — NURSING NOTE
"Chief Complaint   Patient presents with     Toenail     trimming       Initial /82 (BP Location: Left arm, Patient Position: Sitting, Cuff Size: Adult Regular)   Pulse 77   Temp 98.1  F (36.7  C) (Tympanic)   SpO2 91%  Estimated body mass index is 31.81 kg/m  as calculated from the following:    Height as of 4/1/22: 1.753 m (5' 9\").    Weight as of 4/1/22: 97.7 kg (215 lb 6.4 oz).  Medication Reconciliation: complete  Vale Mera  "

## 2022-05-04 NOTE — PROGRESS NOTES
Chief complaint: Patient presents with:  Toenail: trimming      History of Present Illness: This 86 year old male is seen with his daughter, Kathleen, for a diabetic foot exam and high risk nail debridement.     He is complaining of cramping in his legs. The legs cramp a lot and become very painful. The only thing that improves the pain is walking around. It is waking him up and he is wondering how he can improve this pain.    He is also consistently wearing his DM shoes with offloading inserts. He was fit for new DM shoes by the orthotist, Jo Ann Carranza, on 02/16/2022.    He says he still gets his usual burning, tingling, and numbness on his feet, but mostly at night. Most recently, his pain from the back and sternum have been more painful than his neuropathy.    No further pedal complaints today.     Patient does not use tobacco products.     Last HbA1C was 5.9% on 08/30/2021.    ---Previously 6.0% on 01/25/2021.    ---Previously 6.2% on 07/22/2020.    ---Previously 6.0% on 01/13/2020.      -----------------------------------------------------    Historically, patient recently had an I&D with a partial first ray amputation of the RIGHT foot on 07/15/2020.        /82 (BP Location: Left arm, Patient Position: Sitting, Cuff Size: Adult Regular)   Pulse 77   Temp 98.1  F (36.7  C) (Tympanic)   SpO2 91%      Patient Active Problem List   Diagnosis     Diabetic foot infection (H)     Abdominal aortic aneurysm (AAA) without rupture (H)     Benign non-nodular prostatic hyperplasia with lower urinary tract symptoms     Diabetic polyneuropathy associated with type 2 diabetes mellitus (H)     Hyperlipidemia associated with type 2 diabetes mellitus (H)     Hypertension associated with diabetes (H)     PAD (peripheral artery disease) (H)     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)     Research exam     Statin intolerance     Type 2 diabetes mellitus with stage 3 chronic kidney disease, without long-term current use of  insulin (H)     Acquired absence of great toe, right (H)     Gout     Macular cyst, hole, or pseudohole of retina       Past Surgical History:   Procedure Laterality Date     APPENDECTOMY       EYE SURGERY Right     lens implant     INCISION AND DRAINAGE FOOT, COMBINED Right 7/15/2020    Procedure: Right foot incision and drainage with first ray amputation;  Surgeon: Erin Harris DPM;  Location: [facility]     JOINT REPLACEMENT Right      TONSILLECTOMY         Current Outpatient Medications   Medication     allopurinol (ZYLOPRIM) 100 MG tablet     amoxicillin (AMOXIL) 500 MG capsule     Apoaequorin (PREVAGEN EXTRA STRENGTH PO)     Ascorbic Acid (VITAMIN C) 500 MG CAPS     aspirin (ASA) 81 MG chewable tablet     atenolol (TENORMIN) 25 MG tablet     calcium carbonate (OS-FADY 600 MG [place]. [place]) 600 MG tablet     fluocinonide (LIDEX) 0.05 % external ointment     losartan (COZAAR) 50 MG tablet     Multiple Vitamin (MULTIVITAMINS PO)     naproxen sodium (ANAPROX) 220 MG tablet     pravastatin (PRAVACHOL) 10 MG tablet     tamsulosin (FLOMAX) 0.4 MG capsule     Vitamin D, Cholecalciferol, 25 MCG (1000 UT) TABS     No current facility-administered medications for this visit.          Allergies   Allergen Reactions     Flu Virus Vaccine Anaphylaxis     Altace [Ramipril] Other (See Comments)     Dizziness       Ciprofloxacin Diarrhea     Hmg-Coa-R Inhibitors Muscle Pain (Myalgia)     Statin Intolerance Documentation  1. Unable to tolerate at least 2 statins: Crestor at the lowest starting daily dose and Zocor at any daily dose, due to reported symptoms which are temporally related to statin treatment and reversible upon statin discontinuation and other causes have been excluded.     Lisinopril Other (See Comments)     Dizziness       Levaquin [Levofloxacin] Unknown     Sulfa Drugs Unknown     Celebrex [Celecoxib] Rash     Hydrocodone Rash     Niacin Itching       Family History   Problem Relation Age of Onset     Cancer No  family hx of        Social History     Socioeconomic History     Marital status:      Spouse name: Not on file     Number of children: Not on file     Years of education: Not on file     Highest education level: Not on file   Occupational History     Not on file   Social Needs     Financial resource strain: Not on file     Food insecurity     Worry: Not on file     Inability: Not on file     Transportation needs     Medical: Not on file     Non-medical: Not on file   Tobacco Use     Smoking status: Former Smoker     Years: 50.00     Types: Cigarettes     Last attempt to quit: 2001     Years since quittin.5   Substance and Sexual Activity     Alcohol use: No     Drug use: Not on file     Sexual activity: Not on file   Lifestyle     Physical activity     Days per week: Not on file     Minutes per session: Not on file     Stress: Not on file   Relationships     Social connections     Talks on phone: Not on file     Gets together: Not on file     Attends Mandaen service: Not on file     Active member of club or organization: Not on file     Attends meetings of clubs or organizations: Not on file     Relationship status: Not on file     Intimate partner violence     Fear of current or ex partner: Not on file     Emotionally abused: Not on file     Physically abused: Not on file     Forced sexual activity: Not on file   Other Topics Concern     Parent/sibling w/ CABG, MI or angioplasty before 65F 55M? Not Asked   Social History Narrative     Not on file       ROS: 10 point ROS neg other than the symptoms noted above in the HPI.  EXAM  Constitutional: healthy, alert and no distress    Psychiatric: mentation appears normal and affect normal/bright    VASCULAR:  -Dorsalis pedis pulse +2/4 bilaterally   -Posterior tibial pulse +1/4 bilaterally   -Capillary refill time < 3 seconds to  Hallux bilaterally   -Hair growth Absent to anterior leg and ankle bilaterally   -Varicosities and telangiectasias to foot  bilaterally   -Minimal edema on the RIGHT foot  NEURO:  -Protective sensation absent to bilateral plantar foot on 05/04/2022  -Protective sensation diminished with SWM +0/10 RIGHT and +1/10 LEFT on 12/15/2021  -Protective sensation diminished with SWM +1/10 RIGHT and +2/10 LEFT on 08/04/2021  -Protective sensation diminished with SWM +1/10 RIGHT and +0/10 LEFT on 11/04/2020  DERM:  -Skin temperature within normal limits bilaterally   -Skin mildly dry and thin to bilateral foot  -Toenails elongated, thickened, dystrophic and discolored x 9  -Minimal cicatrix to RIGHT dorsal first ray -- healed with no open wounds      MSK:  -Amputation of the partial RIGHT first ray  -Mild rocker botton deformity to the plantar lateral foot  -Bony prominence beneath RIGHT foot plantar medial ulceration near base of the first metatarsal   -Muscle strength of ankles +5/5 for dorsiflexion, plantarflexion, ABDUction and ADDuction b/l with no pain against resistance in multiple directions    LEFT FOOT RADIOGRAPH 02/16/2022  IMPRESSION: No acute fracture.    JONO DSOUZA MD   LEFT ANKLE RADIOGRAPH 02/16/2022  IMPRESSION: No acute fracture.    JONO DSOUZA MD   LEFT TIB/FIB RADIOGRAPH 02/16/2022  IMPRESSION: No acute fracture.     JONO DSOUZA MD   RIGHT FOOT RADIOGRAPH 02/16/2022  IMPRESSION: No change in the appearance of the foot from previous examination in January 2021  JONO DSOUZA MD     ============================================================    ASSESSMENT:    (L60.3) Onychodystrophy  (primary encounter diagnosis)    (R25.2) Bilateral leg cramps    (M14.671) Charcot's joint of right foot     (I83.11,  I83.12) Varicose veins of both lower extremities with inflammation    (I78.1) Telangiectasias    (E11.9) Diabetes mellitus type 2, noninsulin dependent (H)    (E11.42) Diabetic polyneuropathy associated with type 2 diabetes mellitus (H)    (M21.969,  R20.8) Loss of protective sensation of skin of deformed  foot    (Z89.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 Treatment options discussed with no educational barriers noted.    -High risk toenail debridement x 9 toenails without incident (RIGHT hallux amputated)    -Diabetic Foot Education provided. This included checking the feet daily looking for new new blisters or wounds, wearing shoes at all times when walking including around the house, and avoiding lotion application between the toes. If there are any signs of infection, the patient should present to the ED as soon as possible. Infections of the foot can be life threatening or lead to amputations of the foot or leg.    -DM shoes: Patient was fit for new shoes by the orthotist, Jo Ann Carranza, on 02/16/2022. The shoes are comfortable.     -Bilateral leg cramps: Patient's cramping worsens at night. He says he stays hydrated throughout the day. Only walking improves his pain. This could be partially from neuropathy although he may also have a vascular component contributing to the pain. Explained how an MALGORZATA can get a baseline evaluation of the blood flow to the legs. If there is a blockage proximally, then vascular could evaluate this.  ---Patient just saw vascular surgeon, Dr. Garbiel, a week ago but it was for a concern with his abdomen. He did not discuss his legs.  ---Patient is declining the MALGORZATA and a vascular referral at this time. He says the cramping is not intolerable and he'd rather wait for further evaluation. He can call if it worsens.    Total time spent preparing to see the patient, review of chart, obtaining history and physical examination, review of treatment options and referral options for vascular, education on MALGORZATA imaging, education, discussion with patient and documenting in Epic / EMR was 22 minutes.  All time involved was spent on the day of  service for the patient (the same day as the patient's appointment). This time did not include procedure time.     -Patient in agreement with the above treatment plan and all of patient's questions were answered.      -Follow-up in 63+ days for diabetic foot exam and high risk nail debridement with appointment to follow with the orthotist, Jo Ann Carranza, for DM shoes      Erin Harris, YOGI, DPM

## 2022-07-08 NOTE — PROGRESS NOTES
"  Assessment & Plan     1. Type 2 diabetes mellitus with stage 3b chronic kidney disease, without long-term current use of insulin (H)  - Hemoglobin A1c; Future  - TSH with free T4 reflex; Future  - Albumin Random Urine Quantitative with Creat Ratio; Future    2. Hyperlipidemia associated with type 2 diabetes mellitus (H)  Continue pravachol weekly  - Lipid Profile; Future    3. Hypertension associated with diabetes (H)  Well controlled   - Comprehensive metabolic panel; Future    4. On statin therapy  - Comprehensive metabolic panel; Future    5. History of endovascular stent graft for abdominal aortic aneurysm  Vascular notes reviewed.   - CBC with platelets; Future    6. Class 1 obesity with serious comorbidity and body mass index (BMI) of 31.0 to 31.9 in adult, unspecified obesity type  - Vitamin D Deficiency; Future    7. Fatigue, unspecified type  - Ferritin; Future  - Vitamin B12; Future  - Magnesium; Future    8. Abnormal finding of blood chemistry, unspecified   - Ferritin; Future       BMI:   Estimated body mass index is 31.04 kg/m  as calculated from the following:    Height as of this encounter: 1.753 m (5' 9\").    Weight as of this encounter: 95.3 kg (210 lb 3.2 oz).   Weight management plan: Discussed healthy diet and exercise guidelines        Return in about 3 months (around 10/13/2022) for diabetes, lipids, HTN.    Nelia Abreu NP  [facility] [place] [facility]    Subjective   Al is a 86 year old, presenting for the following health issues:  Hypertension, Diabetes, and Lipids      HPI     Diabetes Follow-up      How often are you checking your blood sugar? Not at all    What concerns do you have today about your diabetes? None     Do you have any of these symptoms? (Select all that apply)  Numbness in feet and Burning in feet    Have you had a diabetic eye exam in the last 12 months? No      Hyperlipidemia Follow-Up      Are you regularly taking any medication or supplement to lower " "your cholesterol?   Yes- Pravastatin once a week - any increase in dose he develops myalgia.      Are you having muscle aches or other side effects that you think could be caused by your cholesterol lowering medication?  No only takes once a week     Hypertension Follow-up      Do you check your blood pressure regularly outside of the clinic? No     Are you following a low salt diet? No    Are your blood pressures ever more than 140 on the top number (systolic) OR more   than 90 on the bottom number (diastolic), for example 140/90? No    BP Readings from Last 2 Encounters:   07/13/22 138/72   05/04/22 135/82     No results found for: A1C, LDL      How many servings of fruits and vegetables do you eat daily?  2-3    On average, how many sweetened beverages do you drink each day (Examples: soda, juice, sweet tea, etc.  Do NOT count diet or artificially sweetened beverages)?   0    How many days per week do you exercise enough to make your heart beat faster? 3 or less    How many minutes a day do you exercise enough to make your heart beat faster? 9 or less    How many days per week do you miss taking your medication? 0        Recent Labs   Lab Test 07/17/20  0520 07/16/20  0522   CR 1.22 1.23   GFRESTIMATED 54* 53*   GFRESTBLACK 62 62   POTASSIUM 4.0 4.5      BP Readings from Last 3 Encounters:   07/13/22 138/72   05/04/22 135/82   04/01/22 (!) 150/68    Wt Readings from Last 3 Encounters:   07/13/22 95.3 kg (210 lb 3.2 oz)   04/01/22 97.7 kg (215 lb 6.4 oz)   02/16/22 96.6 kg (213 lb)                 Review of Systems   Constitutional, HEENT, cardiovascular, pulmonary, gi and gu systems are negative, except as otherwise noted.      Objective    /72 (BP Location: Left arm, Patient Position: Chair, Cuff Size: Adult Regular)   Pulse (!) 45   Temp 97.3  F (36.3  C) (Tympanic)   Resp 18   Ht 1.753 m (5' 9\")   Wt 95.3 kg (210 lb 3.2 oz)   SpO2 95%   BMI 31.04 kg/m    Body mass index is 31.04 kg/m .  Physical " Exam   GENERAL: alert, no distress and elderly  NECK: no adenopathy, no asymmetry, masses, or scars and thyroid normal to palpation  RESP: lungs clear to auscultation - no rales, rhonchi or wheezes  CV: regular rate and rhythm, normal S1 S2, no S3 or S4, no murmur, click or rub, no peripheral edema and peripheral pulses strong  MS: no gross musculoskeletal defects noted  PSYCH: mentation appears normal, affect normal/bright                    .  ..

## 2022-07-13 NOTE — NURSING NOTE
"Chief Complaint   Patient presents with     Hypertension     Diabetes     Lipids       Initial /72 (BP Location: Left arm, Patient Position: Chair, Cuff Size: Adult Regular)   Pulse (!) 45   Temp 97.3  F (36.3  C) (Tympanic)   Resp 18   Ht 1.753 m (5' 9\")   Wt 95.3 kg (210 lb 3.2 oz)   SpO2 95%   BMI 31.04 kg/m   Estimated body mass index is 31.04 kg/m  as calculated from the following:    Height as of this encounter: 1.753 m (5' 9\").    Weight as of this encounter: 95.3 kg (210 lb 3.2 oz).  Medication Reconciliation: complete  Pamela M. Lechevalier, LPN    "

## 2022-07-13 NOTE — PATIENT INSTRUCTIONS
"  Assessment & Plan     1. Type 2 diabetes mellitus with stage 3b chronic kidney disease, without long-term current use of insulin (H)  - Hemoglobin A1c; Future  - TSH with free T4 reflex; Future  - Albumin Random Urine Quantitative with Creat Ratio; Future    2. Hyperlipidemia associated with type 2 diabetes mellitus (H)  Continue pravachol weekly  - Lipid Profile; Future    3. Hypertension associated with diabetes (H)  Well controlled   - Comprehensive metabolic panel; Future    4. On statin therapy  - Comprehensive metabolic panel; Future    5. History of endovascular stent graft for abdominal aortic aneurysm  Vascular notes reviewed.   - CBC with platelets; Future    6. Class 1 obesity with serious comorbidity and body mass index (BMI) of 31.0 to 31.9 in adult, unspecified obesity type  - Vitamin D Deficiency; Future    7. Fatigue, unspecified type  - Ferritin; Future  - Vitamin B12; Future  - Magnesium; Future    8. Abnormal finding of blood chemistry, unspecified   - Ferritin; Future       BMI:   Estimated body mass index is 31.04 kg/m  as calculated from the following:    Height as of this encounter: 1.753 m (5' 9\").    Weight as of this encounter: 95.3 kg (210 lb 3.2 oz).   Weight management plan: Discussed healthy diet and exercise guidelines        Return in about 3 months (around 10/13/2022) for diabetes, lipids, HTN.    Nelia Abreu NP  [facility]  "

## 2022-07-13 NOTE — COMMUNITY RESOURCES LIST (ENGLISH)
07/13/2022   [facility] - Outpatient Clinics  N/A  For questions about this resource list or additional care needs, please contact your primary care clinic or care manager.  Phone: [phone]   Email: N/A   Address: [address] [place] [id number]   Hours: N/A        Mental Health       Individual counseling  1  [facility] [place] Distance: 3.95 miles      COVID-19 Status: Phone/Virtual   [address] [place] [address]  Language: English  Hours: Mon - Fri 8:00 AM - 5:00 PM  Fees: Insurance, Self Pay   Phone: [phone] Website: [email or web address]     2  [facility] Distance: 4.25 miles      COVID-19 Status: Phone/Virtual   [address]  [address] [place] [address]  Language: English  Hours: Mon - Thu 8:00 AM - 5:00 PM  Fees: Insurance, Self Pay   Phone: [phone] Website: [email or web address]          Important Numbers & Websites       Emergency Services   911  [facility] Services   311  Poison Control   [phone] [phone]  Suicide Prevention Lifeline   (767) 777-3930 (TALK)  Child Abuse Hotline   (500) 401-4959 (4-A-Child)  Sexual Assault Hotline   (547) 654-3383 (HOPE)  National Runaway Safeline   (555) 217-5145 (RUNAWAY)  All-Options Talkline   (840) 560-4316  Substance Abuse Referral   (561) 355-8544 (HELP)

## 2022-07-13 NOTE — LETTER
July 21, 2022      Mikey Snider  [address] [place] [id number]        Dear ,    We are writing to inform you of your test results.    Vit d normal   Magnesium normal   A1c 5.6% - normal   Kidney function stable   Liver function  normal   Continue pravachol once a week.      Resulted Orders   Hemoglobin A1c   Result Value Ref Range    Estimated Average Glucose 114 mg/dL    Hemoglobin A1C 5.6 0.0 - 5.6 %      Comment:      Normal <5.7%   Prediabetes 5.7-6.4%    Diabetes 6.5% or higher     Note: Adopted from ADA consensus guidelines.   Lipid Profile   Result Value Ref Range    Cholesterol 170 <200 mg/dL    Triglycerides 63 <150 mg/dL    Direct Measure HDL 50 >=40 mg/dL    LDL Cholesterol Calculated 107 (H) <=100 mg/dL    Non HDL Cholesterol 120 <130 mg/dL    Patient Fasting > 8hrs? No     Narrative    Cholesterol  Desirable:  <200 mg/dL    Triglycerides  Normal:  Less than 150 mg/dL  Borderline High:  150-199 mg/dL  High:  200-499 mg/dL  Very High:  Greater than or equal to 500 mg/dL    Direct Measure HDL  Female:  Greater than or equal to 50 mg/dL   Male:  Greater than or equal to 40 mg/dL    LDL Cholesterol  Desirable:  <100mg/dL  Above Desirable:  100-129 mg/dL   Borderline High:  130-159 mg/dL   High:  160-189 mg/dL   Very High:  >= 190 mg/dL    Non HDL Cholesterol  Desirable:  130 mg/dL  Above Desirable:  130-159 mg/dL  Borderline High:  160-189 mg/dL  High:  190-219 mg/dL  Very High:  Greater than or equal to 220 mg/dL   Comprehensive metabolic panel   Result Value Ref Range    Sodium 140 133 - 144 mmol/L    Potassium 4.7 3.4 - 5.3 mmol/L    Chloride 105 94 - 109 mmol/L    Carbon Dioxide (CO2) 29 20 - 32 mmol/L    Anion Gap 6 3 - 14 mmol/L    Urea Nitrogen 22 7 - 30 mg/dL    Creatinine 1.29 (H) 0.66 - 1.25 mg/dL    Calcium 9.7 8.5 - 10.1 mg/dL    Glucose 115 (H) 70 - 99 mg/dL    Alkaline Phosphatase 140 40 - 150 U/L    AST 28 0 - 45 U/L    ALT 26 0 - 70 U/L    Protein Total 7.3 6.8 - 8.8 g/dL     Albumin 3.5 3.4 - 5.0 g/dL    Bilirubin Total 0.6 0.2 - 1.3 mg/dL    GFR Estimate 54 (L) >60 mL/min/1.73m2      Comment:      Effective December 21, 2021 eGFRcr in adults is calculated using the 2021 CKD-EPI creatinine equation which includes age and gender (Adrian moon al., [place], DOI: 10.1056/YGXVhj6969109)   TSH with free T4 reflex   Result Value Ref Range    TSH 0.53 0.40 - 4.00 mU/L   CBC with platelets   Result Value Ref Range    WBC Count 4.2 4.0 - 11.0 10e3/uL    RBC Count 3.43 (L) 4.40 - 5.90 10e6/uL    Hemoglobin 11.4 (L) 13.3 - 17.7 g/dL    Hematocrit 33.9 (L) 40.0 - 53.0 %    MCV 99 78 - 100 fL    MCH 33.2 (H) 26.5 - 33.0 pg    MCHC 33.6 31.5 - 36.5 g/dL    RDW 14.2 10.0 - 15.0 %    Platelet Count 147 (L) 150 - 450 10e3/uL   Ferritin   Result Value Ref Range    Ferritin 368 26 - 388 ng/mL   Vitamin B12   Result Value Ref Range    Vitamin B12 732 232 - 1,245 pg/mL   Vitamin D Deficiency   Result Value Ref Range    Vitamin D, Total (25-Hydroxy) 55 20 - 75 ug/L    Narrative    Season, race, dietary intake, and treatment affect the concentration of 25-hydroxy-Vitamin D. Values may decrease during winter months and increase during summer months. Values 20-29 ug/L may indicate Vitamin D insufficiency and values <20 ug/L may indicate Vitamin D deficiency.    Vitamin D determination is routinely performed by an immunoassay specific for 25 hydroxyvitamin D3.  If an individual is on vitamin D2(ergocalciferol) supplementation, please specify 25 OH vitamin D2 and D3 level determination by LCMSMS test VITD23.     Magnesium   Result Value Ref Range    Magnesium 1.9 1.6 - 2.3 mg/dL   Albumin Random Urine Quantitative with Creat Ratio   Result Value Ref Range    Creatinine Urine mg/dL 89 mg/dL    Albumin Urine mg/L 1,360 mg/L    Albumin Urine mg/g Cr 1,528.09 (H) 0.00 - 17.00 mg/g Cr       If you have any questions or concerns, please call the clinic at the number listed above.       Sincerely,      Nelia Abreu,  NP

## 2022-07-20 NOTE — NURSING NOTE
"Chief Complaint   Patient presents with     Toenail     Toenail trimming and DFE       Initial /78 (BP Location: Right arm, Cuff Size: Adult Regular)   Pulse 55   Temp 97.1  F (36.2  C) (Tympanic)   Resp 15   Ht 1.753 m (5' 9\")   Wt 95.3 kg (210 lb)   SpO2 93%   BMI 31.01 kg/m   Estimated body mass index is 31.01 kg/m  as calculated from the following:    Height as of this encounter: 1.753 m (5' 9\").    Weight as of this encounter: 95.3 kg (210 lb).  Medication Reconciliation: complete  Chio Enamorado LPN  "

## 2022-07-21 NOTE — PROGRESS NOTES
"Chief complaint: Patient presents with:  Toenail: Toenail trimming and DFE      History of Present Illness: This 86 year old male is seen with his daughter, Kathleen, for a diabetic foot exam and high risk nail debridement.     He is no longer complaining of cramping in his legs, but he does still get some cramping in the LEFT plantar midfoot at night. He says he is staying hydrated.    He is also consistently wearing his DM shoes with offloading inserts. He was fit for new DM shoes by the orthotist, Jo Ann Carranza, on 02/16/2022.    He says he still gets his usual burning, tingling, and numbness on his feet, but mostly at night.     No further pedal complaints today.     Patient does not use tobacco products.          Last HbA1C was 5.6% on 07/13/2022.    Last HbA1C was 5.9% on 08/30/2021.    ---Previously 6.0% on 01/25/2021.    ---Previously 6.2% on 07/22/2020.    ---Previously 6.0% on 01/13/2020.      -----------------------------------------------------    Historically, patient recently had an I&D with a partial first ray amputation of the RIGHT foot on 07/15/2020.        /78 (BP Location: Right arm, Cuff Size: Adult Regular)   Pulse 55   Temp 97.1  F (36.2  C) (Tympanic)   Resp 15   Ht 1.753 m (5' 9\")   Wt 95.3 kg (210 lb)   SpO2 93%   BMI 31.01 kg/m       Patient Active Problem List   Diagnosis     Diabetic foot infection (H)     Abdominal aortic aneurysm (AAA) without rupture (H)     Benign non-nodular prostatic hyperplasia with lower urinary tract symptoms     Diabetic polyneuropathy associated with type 2 diabetes mellitus (H)     Hyperlipidemia associated with type 2 diabetes mellitus (H)     Hypertension associated with diabetes (H)     PAD (peripheral artery disease) (H)     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)     Research exam     Statin intolerance     Type 2 diabetes mellitus with stage 3 chronic kidney disease, without long-term current use of insulin (H)     Acquired absence of great " toe, right (H)     Gout     Macular cyst, hole, or pseudohole of retina       Past Surgical History:   Procedure Laterality Date     APPENDECTOMY       EYE SURGERY Right     lens implant     INCISION AND DRAINAGE FOOT, COMBINED Right 7/15/2020    Procedure: Right foot incision and drainage with first ray amputation;  Surgeon: Erin Harris DPM;  Location: [facility]     JOINT REPLACEMENT Right      TONSILLECTOMY         Current Outpatient Medications   Medication     allopurinol (ZYLOPRIM) 100 MG tablet     amoxicillin (AMOXIL) 500 MG capsule     Apoaequorin (PREVAGEN EXTRA STRENGTH PO)     Ascorbic Acid (VITAMIN C) 500 MG CAPS     aspirin (ASA) 81 MG chewable tablet     atenolol (TENORMIN) 25 MG tablet     calcium carbonate (OS-FADY 600 MG [place]. [place]) 600 MG tablet     fluocinonide (LIDEX) 0.05 % external ointment     losartan (COZAAR) 50 MG tablet     Multiple Vitamin (MULTIVITAMINS PO)     naproxen sodium (ANAPROX) 220 MG tablet     pravastatin (PRAVACHOL) 10 MG tablet     tamsulosin (FLOMAX) 0.4 MG capsule     Vitamin D, Cholecalciferol, 25 MCG (1000 UT) TABS     No current facility-administered medications for this visit.          Allergies   Allergen Reactions     Flu Virus Vaccine Anaphylaxis     Altace [Ramipril] Other (See Comments)     Dizziness       Ciprofloxacin Diarrhea     Hmg-Coa-R Inhibitors Muscle Pain (Myalgia)     Statin Intolerance Documentation  1. Unable to tolerate at least 2 statins: Crestor at the lowest starting daily dose and Zocor at any daily dose, due to reported symptoms which are temporally related to statin treatment and reversible upon statin discontinuation and other causes have been excluded.     Lisinopril Other (See Comments)     Dizziness       Levaquin [Levofloxacin] Unknown     Sulfa Drugs Unknown     Celebrex [Celecoxib] Rash     Hydrocodone Rash     Niacin Itching       Family History   Problem Relation Age of Onset     Cancer No family hx of        Social History      Socioeconomic History     Marital status:      Spouse name: Not on file     Number of children: Not on file     Years of education: Not on file     Highest education level: Not on file   Occupational History     Not on file   Social Needs     Financial resource strain: Not on file     Food insecurity     Worry: Not on file     Inability: Not on file     Transportation needs     Medical: Not on file     Non-medical: Not on file   Tobacco Use     Smoking status: Former Smoker     Years: 50.00     Types: Cigarettes     Last attempt to quit: 2001     Years since quittin.5   Substance and Sexual Activity     Alcohol use: No     Drug use: Not on file     Sexual activity: Not on file   Lifestyle     Physical activity     Days per week: Not on file     Minutes per session: Not on file     Stress: Not on file   Relationships     Social connections     Talks on phone: Not on file     Gets together: Not on file     Attends Spiritism service: Not on file     Active member of club or organization: Not on file     Attends meetings of clubs or organizations: Not on file     Relationship status: Not on file     Intimate partner violence     Fear of current or ex partner: Not on file     Emotionally abused: Not on file     Physically abused: Not on file     Forced sexual activity: Not on file   Other Topics Concern     Parent/sibling w/ CABG, MI or angioplasty before 65F 55M? Not Asked   Social History Narrative     Not on file       ROS: 10 point ROS neg other than the symptoms noted above in the HPI.  EXAM  Constitutional: healthy, alert and no distress    Psychiatric: mentation appears normal and affect normal/bright    VASCULAR:  -Dorsalis pedis pulse +2/4 bilaterally   -Posterior tibial pulse +1/4 bilaterally   -Capillary refill time < 3 seconds to  Hallux bilaterally   -Hair growth Absent to anterior leg and ankle bilaterally   -Varicosities and telangiectasias to foot bilaterally   -Minimal edema on the  RIGHT foot  NEURO:  -Protective sensation absent to bilateral plantar foot on 07/020/2022  -Protective sensation diminished with SWM +0/10 RIGHT and +1/10 LEFT on 12/15/2021  -Protective sensation diminished with SWM +1/10 RIGHT and +2/10 LEFT on 08/04/2021  -Protective sensation diminished with SWM +1/10 RIGHT and +0/10 LEFT on 11/04/2020  DERM:  -Skin temperature within normal limits bilaterally   -Skin mildly dry and thin to bilateral foot  -Toenails elongated, thickened, dystrophic and discolored x 9  -Minimal cicatrix to RIGHT dorsal first ray -- healed with no open wounds      MSK:  -Amputation of the partial RIGHT first ray  -Mild rocker botton deformity to the plantar lateral foot  -Bony prominence beneath RIGHT foot plantar medial ulceration near base of the first metatarsal   -Muscle strength of ankles +5/5 for dorsiflexion, plantarflexion, ABDUction and ADDuction b/l with no pain against resistance in multiple directions    LEFT FOOT RADIOGRAPH 02/16/2022  IMPRESSION: No acute fracture.    JONO DSOUZA MD   LEFT ANKLE RADIOGRAPH 02/16/2022  IMPRESSION: No acute fracture.    JONO DSOUZA MD   LEFT TIB/FIB RADIOGRAPH 02/16/2022  IMPRESSION: No acute fracture.     JONO DSOUZA MD   RIGHT FOOT RADIOGRAPH 02/16/2022  IMPRESSION: No change in the appearance of the foot from previous examination in January 2021  JONO DSOUZA MD     ============================================================    ASSESSMENT:    (L60.3) Onychodystrophy  (primary encounter diagnosis)    (R25.2) Bilateral leg cramps    (M14.671) Charcot's joint of right foot     (I83.11,  I83.12) Varicose veins of both lower extremities with inflammation    (I78.1) Telangiectasias    (E11.9) Diabetes mellitus type 2, noninsulin dependent (H)    (E11.42) Diabetic polyneuropathy associated with type 2 diabetes mellitus (H)    (M21.969,  R20.8) Loss of protective sensation of skin of deformed foot    (Z89.431) Status post partial  amputation of foot, right (H)    (Z86.31) Personal history of diabetic foot ulcer    (N18.31) Stage 3a chronic kidney disease      PLAN:  -Patient evaluated and examined. Treatment options discussed with no educational barriers noted.    -High risk toenail debridement x 9 toenails without incident (RIGHT hallux amputated)    -Diabetic Foot Education provided. This included checking the feet daily looking for new new blisters or wounds, wearing shoes at all times when walking including around the house, and avoiding lotion application between the toes. If there are any signs of infection, the patient should present to the ED as soon as possible. Infections of the foot can be life threatening or lead to amputations of the foot or leg.    -DM shoes: Patient was fit for new shoes by the orthotist, Jo Ann Carranza, on 02/16/2022. The shoes are comfortable.     -Patient in agreement with the above treatment plan and all of patient's questions were answered.      -Follow-up in 63+ days for diabetic foot exam and high risk nail debridement         Erin Harris DPM, YOGI

## 2022-09-08 NOTE — TELEPHONE ENCOUNTER
flomax      Last Written Prescription Date:  historical  Last Fill Quantity: ,   # refills:   Last Office Visit: 7/13/22  Future Office visit:    Next 5 appointments (look out 90 days)    Sep 21, 2022 11:45 AM  (Arrive by 11:30 AM)  Return Visit with Erin Harris DPM  [facility] ([facility] ) [address] [id number] [phone]   Oct 14, 2022 10:15 AM  (Arrive by 10:00 AM)  SHORT with Nelia Abreu NP  [facility] ([facility] ) [address] [address]  [phone]

## 2022-09-21 NOTE — NURSING NOTE
"Chief Complaint   Patient presents with     Foot Problems     RECHECK       Initial /68   Pulse 58   Temp 96.9  F (36.1  C) (Tympanic)   Resp 20   SpO2 90%  Estimated body mass index is 31.01 kg/m  as calculated from the following:    Height as of 7/20/22: 1.753 m (5' 9\").    Weight as of 7/20/22: 95.3 kg (210 lb).  Medication Reconciliation: complete  Tamar Cervantes LPN    "

## 2022-09-21 NOTE — PROGRESS NOTES
Chief complaint: Patient presents with:  Foot Problems  RECHECK      History of Present Illness: This 86 year old male is seen with his daughter, Kathleen, for a diabetic foot exam and high risk nail debridement.     He is also consistently wearing his DM shoes with offloading inserts. He was fit for new DM shoes by the orthotist, Jo Ann Carranza, on 02/16/2022. The shoes are still comfortable.    He says he still gets his usual burning, tingling, and numbness on his feet, but mostly at night.     No further pedal complaints today.     Patient does not use tobacco products.          Last HbA1C was 5.6% on 07/13/2022.    Last HbA1C was 5.9% on 08/30/2021.    ---Previously 6.0% on 01/25/2021.    ---Previously 6.2% on 07/22/2020.    ---Previously 6.0% on 01/13/2020.      -----------------------------------------------------    Historically, patient recently had an I&D with a partial first ray amputation of the RIGHT foot on 07/15/2020.        /68   Pulse 58   Temp 96.9  F (36.1  C) (Tympanic)   Resp 20   SpO2 90%      Patient Active Problem List   Diagnosis     Diabetic foot infection (H)     Abdominal aortic aneurysm (AAA) without rupture (H)     Benign non-nodular prostatic hyperplasia with lower urinary tract symptoms     Diabetic polyneuropathy associated with type 2 diabetes mellitus (H)     Hyperlipidemia associated with type 2 diabetes mellitus (H)     Hypertension associated with diabetes (H)     PAD (peripheral artery disease) (H)     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)     Research exam     Statin intolerance     Type 2 diabetes mellitus with stage 3 chronic kidney disease, without long-term current use of insulin (H)     Acquired absence of great toe, right (H)     Gout     Macular cyst, hole, or pseudohole of retina       Past Surgical History:   Procedure Laterality Date     APPENDECTOMY       EYE SURGERY Right     lens implant     INCISION AND DRAINAGE FOOT, COMBINED Right 7/15/2020    Procedure:  Right foot incision and drainage with first ray amputation;  Surgeon: Erin Harris DPM;  Location: [facility]     JOINT REPLACEMENT Right      TONSILLECTOMY         Current Outpatient Medications   Medication     allopurinol (ZYLOPRIM) 100 MG tablet     Apoaequorin (PREVAGEN EXTRA STRENGTH PO)     Ascorbic Acid (VITAMIN C) 500 MG CAPS     aspirin (ASA) 81 MG chewable tablet     atenolol (TENORMIN) 25 MG tablet     calcium carbonate (OS-FADY 600 MG [place]. [place]) 600 MG tablet     fluocinonide (LIDEX) 0.05 % external ointment     losartan (COZAAR) 50 MG tablet     Multiple Vitamin (MULTIVITAMINS PO)     naproxen sodium (ANAPROX) 220 MG tablet     pravastatin (PRAVACHOL) 10 MG tablet     tamsulosin (FLOMAX) 0.4 MG capsule     Vitamin D, Cholecalciferol, 25 MCG (1000 UT) TABS     amoxicillin (AMOXIL) 500 MG capsule     No current facility-administered medications for this visit.          Allergies   Allergen Reactions     Flu Virus Vaccine Anaphylaxis     Altace [Ramipril] Other (See Comments)     Dizziness       Ciprofloxacin Diarrhea     Hmg-Coa-R Inhibitors Muscle Pain (Myalgia)     Statin Intolerance Documentation  1. Unable to tolerate at least 2 statins: Crestor at the lowest starting daily dose and Zocor at any daily dose, due to reported symptoms which are temporally related to statin treatment and reversible upon statin discontinuation and other causes have been excluded.     Lisinopril Other (See Comments)     Dizziness       Levaquin [Levofloxacin] Unknown     Sulfa Drugs Unknown     Celebrex [Celecoxib] Rash     Hydrocodone Rash     Niacin Itching       Family History   Problem Relation Age of Onset     Cancer No family hx of        Social History     Socioeconomic History     Marital status:      Spouse name: Not on file     Number of children: Not on file     Years of education: Not on file     Highest education level: Not on file   Occupational History     Not on file   Social Needs     Financial  resource strain: Not on file     Food insecurity     Worry: Not on file     Inability: Not on file     Transportation needs     Medical: Not on file     Non-medical: Not on file   Tobacco Use     Smoking status: Former Smoker     Years: 50.00     Types: Cigarettes     Last attempt to quit: 2001     Years since quittin.5   Substance and Sexual Activity     Alcohol use: No     Drug use: Not on file     Sexual activity: Not on file   Lifestyle     Physical activity     Days per week: Not on file     Minutes per session: Not on file     Stress: Not on file   Relationships     Social connections     Talks on phone: Not on file     Gets together: Not on file     Attends Protestant service: Not on file     Active member of club or organization: Not on file     Attends meetings of clubs or organizations: Not on file     Relationship status: Not on file     Intimate partner violence     Fear of current or ex partner: Not on file     Emotionally abused: Not on file     Physically abused: Not on file     Forced sexual activity: Not on file   Other Topics Concern     Parent/sibling w/ CABG, MI or angioplasty before 65F 55M? Not Asked   Social History Narrative     Not on file       ROS: 10 point ROS neg other than the symptoms noted above in the HPI.  EXAM  Constitutional: healthy, alert and no distress    Psychiatric: mentation appears normal and affect normal/bright    VASCULAR:  -Dorsalis pedis pulse +2/4 bilaterally   -Posterior tibial pulse +1/4 bilaterally   -Capillary refill time < 3 seconds to  Hallux bilaterally   -Hair growth Absent to anterior leg and ankle bilaterally   -Varicosities and telangiectasias to foot bilaterally   -Minimal edema on the RIGHT foot  NEURO:  -Epicritic and protective sensation ABSENT to the bilateral foot  DERM:  -Skin temperature within normal limits bilaterally   -Skin mildly dry and thin to bilateral foot  -Toenails elongated, thickened, dystrophic and discolored x 9  -Minimal  cicatrix to RIGHT dorsal first ray -- healed with no open wounds      MSK:  -Amputation of the partial RIGHT first ray  -Mild rocker botton deformity to the plantar lateral foot  -Bony prominence beneath RIGHT foot plantar medial ulceration near base of the first metatarsal   -Muscle strength of ankles +5/5 for dorsiflexion, plantarflexion, ABDUction and ADDuction b/l with no pain against resistance in multiple directions    LEFT FOOT RADIOGRAPH 02/16/2022  IMPRESSION: No acute fracture.    JONO DSOUZA MD   LEFT ANKLE RADIOGRAPH 02/16/2022  IMPRESSION: No acute fracture.    JONO DSOUZA MD   LEFT TIB/FIB RADIOGRAPH 02/16/2022  IMPRESSION: No acute fracture.     JONO DSOUZA MD   RIGHT FOOT RADIOGRAPH 02/16/2022  IMPRESSION: No change in the appearance of the foot from previous examination in January 2021  JONO DSOUZA MD     ============================================================    ASSESSMENT:    (L60.3) Onychodystrophy  (primary encounter diagnosis)    (R25.2) Bilateral leg cramps    (M14.671) Charcot's joint of right foot     (I83.11,  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 Treatment options discussed with no educational barriers noted.    -High risk toenail debridement x 9 toenails without incident (RIGHT hallux amputated)    -Diabetic Foot Education provided. This included checking the feet daily looking for new new blisters or wounds, wearing shoes at all times when walking including around the house, and avoiding lotion  application between the toes. If there are any signs of infection, the patient should present to the ED as soon as possible. Infections of the foot can be life threatening or lead to amputations of the foot or leg.    -DM shoes: Patient was fit for new shoes by the orthotist, Jo Ann Carranza, on 02/16/2022. The shoes are comfortable.     -Patient in agreement with the above treatment plan and all of patient's questions were answered.      -Follow-up in 63+ days for diabetic foot exam and high risk nail debridement         Erin Harris, ROMAINM, DPM

## 2022-09-26 NOTE — TELEPHONE ENCOUNTER
losartan      Last Written Prescription Date:  4/1/22  Last Fill Quantity: 90,   # refills: 1  Last Office Visit: 9/21/22  Future Office visit:    Next 5 appointments (look out 90 days)    Oct 14, 2022 10:15 AM  (Arrive by 10:00 AM)  SHORT with Nelia Abreu NP  [facility] ([facility] ) [address] [address]  [phone]   Dec 07, 2022 12:00 PM  (Arrive by 11:45 AM)  Return Visit with Erin Harris DPM  [facility] ([facility] ) [address] [facility] [id number] [phone]           Routing refill request to provider for review/approval because:

## 2022-10-18 NOTE — PROGRESS NOTES
Assessment & Plan     1. Type 2 diabetes mellitus with stage 3b chronic kidney disease, without long-term current use of insulin (H)  - Hemoglobin A1c; Future  - if A1c is again normal, will resolve diabetes on active problem list.     2. Diabetic polyneuropathy associated with type 2 diabetes mellitus (H)  stable    3. Hyperlipidemia LDL goal <100  Continue pravachol weekly  - Lipid Profile; Future    4. Benign essential hypertension  Well controlled   - Comprehensive metabolic panel; Future  - TSH with free T4 reflex; Future  - atenolol (TENORMIN) 25 MG tablet; Take 1 tablet (25 mg) by mouth daily  Dispense: 90 tablet; Refill: 1    5. Idiopathic chronic gout without tophus, unspecified site  Continue allopurinol    6. PAD (peripheral artery disease) (H)  Stable     7. On statin therapy  - Comprehensive metabolic panel; Future    8. Muscle cramp  Continue water   - Magnesium; Future  - Vitamin B12; Future  - CBC with platelets; Future        Nelia Abreu NP  [facility] [place] Jensen is a 86 year old, presenting for the following health issues:  Hypertension, Diabetes, and Lipids      HPI     Diabetes Follow-up      How often are you checking your blood sugar? Not at all    What concerns do you have today about your diabetes? None     Do you have any of these symptoms? (Select all that apply)  Numbness in feet and Burning in feet    Have you had a diabetic eye exam in the last 12 months? No        Hyperlipidemia Follow-Up      Are you regularly taking any medication or supplement to lower your cholesterol?   Yes- Pravachol takes once a week     Are you having muscle aches or other side effects that you think could be caused by your cholesterol lowering medication?  Yes- muslce cramps     Hypertension Follow-up      Do you check your blood pressure regularly outside of the clinic? No     Are you following a low salt diet? No    Are your blood pressures ever more than 140 on  "the top number (systolic) OR more   than 90 on the bottom number (diastolic), for example 140/90? No    BP Readings from Last 2 Encounters:   10/19/22 130/60   09/21/22 122/68     Hemoglobin A1C (%)   Date Value   07/13/2022 5.6     LDL Cholesterol Calculated (mg/dL)   Date Value   07/13/2022 107 (H)           Recent Labs   Lab Test 07/13/22  1144 07/17/20  0520 07/16/20  0522   A1C 5.6  --   --    *  --   --    HDL 50  --   --    TRIG 63  --   --    ALT 26  --   --    CR 1.29* 1.22 1.23   GFRESTIMATED 54* 54* 53*   GFRESTBLACK  --  62 62   POTASSIUM 4.7 4.0 4.5   TSH 0.53  --   --       BP Readings from Last 3 Encounters:   10/19/22 130/60   09/21/22 122/68   07/20/22 138/78    Wt Readings from Last 3 Encounters:   10/19/22 95.5 kg (210 lb 8 oz)   07/20/22 95.3 kg (210 lb)   07/13/22 95.3 kg (210 lb 3.2 oz)                      Review of Systems   Constitutional, HEENT, cardiovascular, pulmonary, gi and gu systems are negative, except as otherwise noted.      Objective    /60 (BP Location: Right arm, Patient Position: Chair, Cuff Size: Adult Large)   Pulse 63   Temp 99  F (37.2  C) (Tympanic)   Resp 18   Ht 1.753 m (5' 9\")   Wt 95.5 kg (210 lb 8 oz)   SpO2 93%   BMI 31.09 kg/m    Body mass index is 31.09 kg/m .  Physical Exam   GENERAL: healthy, alert and no distress  NECK: no adenopathy, no asymmetry, masses, or scars, thyroid normal to palpation and no carotid bruits  RESP: lungs clear to auscultation - no rales, rhonchi or wheezes  CV: regular rate and rhythm, normal S1 S2, no S3 or S4, no murmur, click or rub, no peripheral edema and peripheral pulses strong  MS: no gross musculoskeletal defects noted, no edema  PSYCH: mentation appears normal, affect normal/bright                    "

## 2022-10-19 NOTE — NURSING NOTE
"Chief Complaint   Patient presents with     Hypertension     Diabetes     Lipids       Initial /60 (BP Location: Right arm, Patient Position: Chair, Cuff Size: Adult Large)   Pulse 63   Temp 99  F (37.2  C) (Tympanic)   Resp 18   Ht 1.753 m (5' 9\")   Wt 95.5 kg (210 lb 8 oz)   SpO2 93%   BMI 31.09 kg/m   Estimated body mass index is 31.09 kg/m  as calculated from the following:    Height as of this encounter: 1.753 m (5' 9\").    Weight as of this encounter: 95.5 kg (210 lb 8 oz).  Medication Reconciliation: complete  Pamela M. Lechevalier, LPN    "

## 2022-10-19 NOTE — PATIENT INSTRUCTIONS
Assessment & Plan     1. Type 2 diabetes mellitus with stage 3b chronic kidney disease, without long-term current use of insulin (H)  - Hemoglobin A1c; Future    2. Diabetic polyneuropathy associated with type 2 diabetes mellitus (H)  stable    3. Hyperlipidemia LDL goal <100  Continue pravachol weekly  - Lipid Profile; Future    4. Benign essential hypertension  Well controlled   - Comprehensive metabolic panel; Future  - TSH with free T4 reflex; Future  - atenolol (TENORMIN) 25 MG tablet; Take 1 tablet (25 mg) by mouth daily  Dispense: 90 tablet; Refill: 1    5. Idiopathic chronic gout without tophus, unspecified site  Continue allopurinol    6. PAD (peripheral artery disease) (H)  Stable     7. On statin therapy  - Comprehensive metabolic panel; Future    8. Muscle cramp  Continue water   - Magnesium; Future  - Vitamin B12; Future  - CBC with platelets; Future        Nelia Abreu NP  [facility]

## 2022-10-20 PROBLEM — R73.03 PRE-DIABETES: Status: ACTIVE | Noted: 2022-01-01

## 2022-10-20 PROBLEM — N18.30 TYPE 2 DIABETES MELLITUS WITH STAGE 3 CHRONIC KIDNEY DISEASE, WITHOUT LONG-TERM CURRENT USE OF INSULIN (H): Status: RESOLVED | Noted: 2017-01-06 | Resolved: 2022-01-01

## 2022-10-20 PROBLEM — E11.22 TYPE 2 DIABETES MELLITUS WITH STAGE 3 CHRONIC KIDNEY DISEASE, WITHOUT LONG-TERM CURRENT USE OF INSULIN (H): Status: RESOLVED | Noted: 2017-01-06 | Resolved: 2022-01-01

## 2022-10-20 PROBLEM — N18.32 STAGE 3B CHRONIC KIDNEY DISEASE (H): Status: ACTIVE | Noted: 2022-01-01

## 2022-11-09 NOTE — TELEPHONE ENCOUNTER
2:48 PM    Reason for Call: Phone Call    Description: Daughter Kathleen calling for her dad stating he took a fall was in Urgent Care at [facility] and they suggested him to see an orthopedic doctor for his right shoulder.     Was an appointment offered for this call? No  If yes : Appointment type              Date    Preferred method for responding to this message: Telephone Call  What is your phone number ? [phone] Kathleen    If we cannot reach you directly, may we leave a detailed response at the number you provided? Yes    Can this message wait until your PCP/provider returns, if available today? YES, No

## 2022-11-23 NOTE — PROGRESS NOTES
Service Date: 11/23/2022    HISTORY OF PRESENT ILLNESS:  Mr. Snider is an 87-year-old male who is right-handed.  He had a fall 3 weeks ago and injured his right shoulder.  He complains of shoulder discomfort and weakness.  Today, he really does not complain of any pain at rest.  He states that he has a history of having had shoulder pain problems in the past.  Years ago, he had an MRI down in [place] of his right shoulder.  He was told at that time that he had a rotator cuff tear and that he needed surgery.  He chose not to do surgery and he improved and was functional.  He has always had some problems with both of his shoulders, but after the fall 3 weeks ago, he noticed more pain, discomfort and weakness in his right shoulder.  The pain and discomfort have improved, but he still has some decreased range of motion.  He is here today for evaluation and treatment.  He had x-rays taken today, two views, that show AC joint degenerative changes, type 2 acromion, otherwise negative.    PHYSICAL EXAMINATION:  On today's exam, the patient is awake, alert, oriented, no acute distress.  Overall, general mood, affect and appearance are normal.  Vital signs:  Temperature 97.7, weight 200, blood pressure 138/74.  On evaluation of the right shoulder, he has really no tenderness to palpation.  He has external rotation to about 20 degrees.  He has forward flexion to about 80 degrees, abduction to about 80 degrees, internal rotation to the iliac crest.  He has some weakness with external rotation and abduction against resistance.  He is distally neurovascularly intact.    IMAGING:  X-rays were reviewed with the above-mentioned findings.    ASSESSMENT AND PLAN:  The patient had a fall recently.  I suspect that he did have a rotator cuff tear based on his history and his previous MRI.  I think he may have worsened the rotator cuff tear with the fall.  We talked about getting an MRI and maybe consideration for surgery.  The patient  does not want to get an MRI today.  We talked about just trying a course of physical therapy and over-the-counter medications to see if we can improve his function enough that he is satisfied with the functionality of his shoulder.  He would like to do this rather than consider surgical options at this point.  We will have him start physical therapy and follow up in 6-8 weeks if he is not improving satisfactorily.    Duglas Dorman MD        D: 2022   T: 2022   MT: ORIN    Name:     ISSA ZULUAGA  MRN:      [id number]        Account:      [id number]   :      1935           Service Date: 2022       Document: [id number]

## 2024-10-11 NOTE — TELEPHONE ENCOUNTER
Left message informing patient of message from Dr. Alas.   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

## 2025-03-12 NOTE — NURSING NOTE
"Chief Complaint   Patient presents with     Fracture     recheck right foot fracture       Initial /64 (BP Location: Left arm, Cuff Size: Adult Large)   Pulse 65   Temp 97.5  F (36.4  C) (Tympanic)   Resp 16   Ht 1.753 m (5' 9\")   Wt 95.7 kg (211 lb)   SpO2 98%   BMI 31.16 kg/m   Estimated body mass index is 31.16 kg/m  as calculated from the following:    Height as of this encounter: 1.753 m (5' 9\").    Weight as of this encounter: 95.7 kg (211 lb).  Medication Reconciliation: complete  Chio Enamorado LPN  " Yes.

## (undated) DEVICE — CANISTER-SUCTION 2000CC

## (undated) DEVICE — PULSE LAVAGE IRRIGATION SYSTEM

## (undated) DEVICE — DRSG-KERLIX 6 X 6 3/4 FLUFF

## (undated) DEVICE — BLADE-SCALPEL #15

## (undated) DEVICE — BLADE-SAW 25.0MM X 9.0MM

## (undated) DEVICE — DRSG-ABDOMINAL 8 X 10

## (undated) DEVICE — DRSG-KERLIX ROLL 4.5 X 4.1YD

## (undated) DEVICE — DRAPE-THREE QUARTER (LARGE) SHEET

## (undated) DEVICE — DRAPE-EXTREMITY SHEET

## (undated) DEVICE — BDG-ESMARK 4 INCH X 9 FT

## (undated) DEVICE — IRRIGATION-H2O 1000ML

## (undated) DEVICE — GLV-6.5 BIOGEL LATEX GEN SURG

## (undated) DEVICE — DRAPE-STERI 45X60CM #1010

## (undated) DEVICE — IRRIGATION-NACL 1000ML

## (undated) DEVICE — BDG-ELASTIC 4 INCH DBL.

## (undated) DEVICE — TUBING-SUCTION 20FT

## (undated) DEVICE — NDL-25G 1-1/2" NON-SAFETY

## (undated) DEVICE — APPLICATOR-CHLORAPREP 26ML TINTED CHG 2%+ 70% IPA-SURGICAL

## (undated) DEVICE — GOWN-SURG XL LVL 3 REINFORCED

## (undated) DEVICE — DRSG-SPONGE STERILE 8 X 4

## (undated) DEVICE — DRAPE-ISOLATION BAG

## (undated) DEVICE — Device

## (undated) DEVICE — CUFF-DISP STERILE 30IN SINGLE BLADDER

## (undated) DEVICE — LIGHT HANDLE COVER

## (undated) DEVICE — CAUTERY PENCIL-SMOKE EVACUATION

## (undated) DEVICE — LABEL-STERILE PREPRINTED FOR OR

## (undated) DEVICE — SUTURE-ETHILON 3-0 PS-2 1669H

## (undated) DEVICE — PACK-SET UP-CUSTOM

## (undated) DEVICE — GLV-7.0 PROTEXIS PI BLUE W/NEU-THERA LF/PF

## (undated) DEVICE — PACK-BASIN SET-UP

## (undated) DEVICE — SYRINGE-10CC LUER LOCK

## (undated) DEVICE — CAUTERY PAD-POLYHESIVE II ADULT

## (undated) RX ORDER — EPHEDRINE SULFATE 50 MG/ML
INJECTION, SOLUTION INTRAVENOUS
Status: DISPENSED
Start: 2020-07-15

## (undated) RX ORDER — KETAMINE HCL IN NACL, ISO-OSM 100MG/10ML
SYRINGE (ML) INJECTION
Status: DISPENSED
Start: 2020-07-15

## (undated) RX ORDER — FENTANYL CITRATE 50 UG/ML
INJECTION, SOLUTION INTRAMUSCULAR; INTRAVENOUS
Status: DISPENSED
Start: 2020-07-15

## (undated) RX ORDER — PROPOFOL 10 MG/ML
INJECTION, EMULSION INTRAVENOUS
Status: DISPENSED
Start: 2020-07-15